# Patient Record
Sex: FEMALE | Race: WHITE | NOT HISPANIC OR LATINO | Employment: OTHER | URBAN - METROPOLITAN AREA
[De-identification: names, ages, dates, MRNs, and addresses within clinical notes are randomized per-mention and may not be internally consistent; named-entity substitution may affect disease eponyms.]

---

## 2019-12-04 ENCOUNTER — TRANSCRIBE ORDERS (OUTPATIENT)
Dept: LAB | Facility: HOSPITAL | Age: 64
End: 2019-12-04

## 2019-12-04 ENCOUNTER — HOSPITAL ENCOUNTER (OUTPATIENT)
Dept: GENERAL RADIOLOGY | Facility: HOSPITAL | Age: 64
Discharge: HOME | End: 2019-12-04
Admitting: ORTHOPAEDIC SURGERY

## 2019-12-04 ENCOUNTER — TRANSCRIBE ORDERS (OUTPATIENT)
Dept: ADMINISTRATIVE | Facility: HOSPITAL | Age: 64
End: 2019-12-04

## 2019-12-04 DIAGNOSIS — R73.09 IMPAIRED GLUCOSE TOLERANCE TEST: ICD-10-CM

## 2019-12-04 DIAGNOSIS — Z01.811 PRE-OP CHEST EXAM: Primary | ICD-10-CM

## 2019-12-04 DIAGNOSIS — Z01.818 OTHER SPECIFIED PRE-OPERATIVE EXAMINATION: ICD-10-CM

## 2019-12-04 DIAGNOSIS — Z87.898 PERSONAL HISTORY OF OTHER LYMPHATIC AND HEMATOPOIETIC NEOPLASM: Primary | ICD-10-CM

## 2019-12-04 LAB
DEPRECATED RDW RBC AUTO: 44.8 FL (ref 37–54)
ERYTHROCYTE [DISTWIDTH] IN BLOOD BY AUTOMATED COUNT: 13.6 % (ref 12.3–15.4)
HBA1C MFR BLD: 5.64 % (ref 4.8–5.6)
HCT VFR BLD AUTO: 38.6 % (ref 34–46.6)
HGB BLD-MCNC: 13.6 G/DL (ref 12–15.9)
MCH RBC QN AUTO: 31.5 PG (ref 26.6–33)
MCHC RBC AUTO-ENTMCNC: 35.2 G/DL (ref 31.5–35.7)
MCV RBC AUTO: 89.4 FL (ref 79–97)
PLATELET # BLD AUTO: 140 10*3/MM3 (ref 140–450)
PMV BLD AUTO: 10.6 FL (ref 6–12)
RBC # BLD AUTO: 4.32 10*6/MM3 (ref 3.77–5.28)
WBC NRBC COR # BLD: 4.49 10*3/MM3 (ref 3.4–10.8)

## 2019-12-04 PROCEDURE — 36415 COLL VENOUS BLD VENIPUNCTURE: CPT | Performed by: ORTHOPAEDIC SURGERY

## 2019-12-04 PROCEDURE — 83036 HEMOGLOBIN GLYCOSYLATED A1C: CPT | Performed by: ORTHOPAEDIC SURGERY

## 2019-12-04 PROCEDURE — 71046 X-RAY EXAM CHEST 2 VIEWS: CPT

## 2019-12-04 PROCEDURE — 80048 BASIC METABOLIC PNL TOTAL CA: CPT | Performed by: ORTHOPAEDIC SURGERY

## 2019-12-04 PROCEDURE — 85027 COMPLETE CBC AUTOMATED: CPT | Performed by: ORTHOPAEDIC SURGERY

## 2019-12-04 PROCEDURE — 93005 ELECTROCARDIOGRAM TRACING: CPT | Performed by: ORTHOPAEDIC SURGERY

## 2019-12-04 PROCEDURE — 93010 ELECTROCARDIOGRAM REPORT: CPT | Performed by: INTERNAL MEDICINE

## 2019-12-05 LAB
ANION GAP SERPL CALCULATED.3IONS-SCNC: 13 MMOL/L (ref 5–15)
BUN BLD-MCNC: 20 MG/DL (ref 8–23)
BUN/CREAT SERPL: 24.7 (ref 7–25)
CALCIUM SPEC-SCNC: 9.6 MG/DL (ref 8.6–10.5)
CHLORIDE SERPL-SCNC: 102 MMOL/L (ref 98–107)
CO2 SERPL-SCNC: 26 MMOL/L (ref 22–29)
CREAT BLD-MCNC: 0.81 MG/DL (ref 0.57–1)
GFR SERPL CREATININE-BSD FRML MDRD: 72 ML/MIN/1.73
GLUCOSE BLD-MCNC: 91 MG/DL (ref 65–99)
POTASSIUM BLD-SCNC: 4 MMOL/L (ref 3.5–5.2)
SODIUM BLD-SCNC: 141 MMOL/L (ref 136–145)

## 2020-09-17 ENCOUNTER — OFFICE VISIT (OUTPATIENT)
Dept: CARDIOLOGY | Facility: CLINIC | Age: 65
End: 2020-09-17

## 2020-09-17 VITALS
BODY MASS INDEX: 31.79 KG/M2 | SYSTOLIC BLOOD PRESSURE: 108 MMHG | HEART RATE: 74 BPM | OXYGEN SATURATION: 95 % | WEIGHT: 186.2 LBS | HEIGHT: 64 IN | DIASTOLIC BLOOD PRESSURE: 71 MMHG | TEMPERATURE: 97.5 F

## 2020-09-17 DIAGNOSIS — Z01.818 PREOPERATIVE CLEARANCE: ICD-10-CM

## 2020-09-17 DIAGNOSIS — R06.02 SOB (SHORTNESS OF BREATH): Primary | ICD-10-CM

## 2020-09-17 DIAGNOSIS — R94.31 ABNORMAL EKG: ICD-10-CM

## 2020-09-17 PROCEDURE — 99203 OFFICE O/P NEW LOW 30 MIN: CPT | Performed by: PHYSICIAN ASSISTANT

## 2020-09-17 RX ORDER — ESCITALOPRAM OXALATE 10 MG/1
10 TABLET ORAL DAILY
COMMUNITY
End: 2023-01-27 | Stop reason: SDUPTHER

## 2020-09-17 RX ORDER — LEVOTHYROXINE SODIUM 75 UG/1
75 CAPSULE ORAL DAILY
COMMUNITY
End: 2022-07-12

## 2020-09-17 RX ORDER — TRAMADOL HYDROCHLORIDE 50 MG/1
50 TABLET ORAL DAILY PRN
COMMUNITY
End: 2022-05-19

## 2020-09-17 NOTE — PATIENT INSTRUCTIONS
"Fat and Cholesterol Restricted Eating Plan  Getting too much fat and cholesterol in your diet may cause health problems. Choosing the right foods helps keep your fat and cholesterol at normal levels. This can keep you from getting certain diseases.  Your doctor may recommend an eating plan that includes:  · Total fat: ______% or less of total calories a day.  · Saturated fat: ______% or less of total calories a day.  · Cholesterol: less than _________mg a day.  · Fiber: ______g a day.  What are tips for following this plan?  Meal planning  · At meals, divide your plate into four equal parts:  ? Fill one-half of your plate with vegetables and green salads.  ? Fill one-fourth of your plate with whole grains.  ? Fill one-fourth of your plate with low-fat (lean) protein foods.  · Eat fish that is high in omega-3 fats at least two times a week. This includes mackerel, tuna, sardines, and salmon.  · Eat foods that are high in fiber, such as whole grains, beans, apples, broccoli, carrots, peas, and barley.  General tips    · Work with your doctor to lose weight if you need to.  · Avoid:  ? Foods with added sugar.  ? Fried foods.  ? Foods with partially hydrogenated oils.  · Limit alcohol intake to no more than 1 drink a day for nonpregnant women and 2 drinks a day for men. One drink equals 12 oz of beer, 5 oz of wine, or 1½ oz of hard liquor.  Reading food labels  · Check food labels for:  ? Trans fats.  ? Partially hydrogenated oils.  ? Saturated fat (g) in each serving.  ? Cholesterol (mg) in each serving.  ? Fiber (g) in each serving.  · Choose foods with healthy fats, such as:  ? Monounsaturated fats.  ? Polyunsaturated fats.  ? Omega-3 fats.  · Choose grain products that have whole grains. Look for the word \"whole\" as the first word in the ingredient list.  Cooking  · Cook foods using low-fat methods. These include baking, boiling, grilling, and broiling.  · Eat more home-cooked foods. Eat at restaurants and buffets " less often.  · Avoid cooking using saturated fats, such as butter, cream, palm oil, palm kernel oil, and coconut oil.  Recommended foods    Fruits  · All fresh, canned (in natural juice), or frozen fruits.  Vegetables  · Fresh or frozen vegetables (raw, steamed, roasted, or grilled). Green salads.  Grains  · Whole grains, such as whole wheat or whole grain breads, crackers, cereals, and pasta. Unsweetened oatmeal, bulgur, barley, quinoa, or brown rice. Corn or whole wheat flour tortillas.  Meats and other protein foods  · Ground beef (85% or leaner), grass-fed beef, or beef trimmed of fat. Skinless chicken or turkey. Ground chicken or turkey. Pork trimmed of fat. All fish and seafood. Egg whites. Dried beans, peas, or lentils. Unsalted nuts or seeds. Unsalted canned beans. Nut butters without added sugar or oil.  Dairy  · Low-fat or nonfat dairy products, such as skim or 1% milk, 2% or reduced-fat cheeses, low-fat and fat-free ricotta or cottage cheese, or plain low-fat and nonfat yogurt.  Fats and oils  · Tub margarine without trans fats. Light or reduced-fat mayonnaise and salad dressings. Avocado. Olive, canola, sesame, or safflower oils.  The items listed above may not be a complete list of foods and beverages you can eat. Contact a dietitian for more information.  Foods to avoid  Fruits  · Canned fruit in heavy syrup. Fruit in cream or butter sauce. Fried fruit.  Vegetables  · Vegetables cooked in cheese, cream, or butter sauce. Fried vegetables.  Grains  · White bread. White pasta. White rice. Cornbread. Bagels, pastries, and croissants. Crackers and snack foods that contain trans fat and hydrogenated oils.  Meats and other protein foods  · Fatty cuts of meat. Ribs, chicken wings, rosas, sausage, bologna, salami, chitterlings, fatback, hot dogs, bratwurst, and packaged lunch meats. Liver and organ meats. Whole eggs and egg yolks. Chicken and turkey with skin. Fried meat.  Dairy  · Whole or 2% milk, cream,  half-and-half, and cream cheese. Whole milk cheeses. Whole-fat or sweetened yogurt. Full-fat cheeses. Nondairy creamers and whipped toppings. Processed cheese, cheese spreads, and cheese curds.  Beverages  · Alcohol. Sugar-sweetened drinks such as sodas, lemonade, and fruit drinks.  Fats and oils  · Butter, stick margarine, lard, shortening, ghee, or rosas fat. Coconut, palm kernel, and palm oils.  Sweets and desserts  · Corn syrup, sugars, honey, and molasses. Candy. Jam and jelly. Syrup. Sweetened cereals. Cookies, pies, cakes, donuts, muffins, and ice cream.  The items listed above may not be a complete list of foods and beverages you should avoid. Contact a dietitian for more information.  Summary  · Choosing the right foods helps keep your fat and cholesterol at normal levels. This can keep you from getting certain diseases.  · At meals, fill one-half of your plate with vegetables and green salads.  · Eat high-fiber foods, like whole grains, beans, apples, carrots, peas, and barley.  · Limit added sugar, saturated fats, alcohol, and fried foods.  This information is not intended to replace advice given to you by your health care provider. Make sure you discuss any questions you have with your health care provider.  Document Released: 06/18/2013 Document Revised: 08/21/2019 Document Reviewed: 09/04/2018  Elsevier Patient Education © 2020 [employer]  BMI for Adults    Body mass index (BMI) is a number that is calculated from a person's weight and height. BMI may help to estimate how much of a person's weight is composed of fat. BMI can help identify those who may be at higher risk for certain medical problems.  How is BMI used with adults?  BMI is used as a screening tool to identify possible weight problems. It is used to check whether a person is obese, overweight, healthy weight, or underweight.  How is BMI calculated?  BMI measures your weight and compares it to your height. This can be done either in  "English ([place] or metric measurements. Note that charts are available to help you find your BMI quickly and easily without having to do these calculations yourself.  To calculate your BMI in English ([place] measurements, your health care provider will:  1. Measure your weight in pounds (lb).  2. Multiply the number of pounds by 703.  ? For example, for a person who weighs 180 lb, multiply that number by 703, which equals 126,540.  3. Measure your height in inches (in). Then multiply that number by itself to get a measurement called \"inches squared.\"  ? For example, for a person who is 70 in tall, the \"inches squared\" measurement is 70 in x 70 in, which equals 4900 inches squared.  4. Divide the total from Step 2 (number of lb x 703) by the total from Step 3 (inches squared): 126,540 ÷ 4900 = 25.8. This is your BMI.  To calculate your BMI in metric measurements, your health care provider will:  1. Measure your weight in kilograms (kg).  2. Measure your height in meters (m). Then multiply that number by itself to get a measurement called \"meters squared.\"  ? For example, for a person who is 1.75 m tall, the \"meters squared\" measurement is 1.75 m x 1.75 m, which is equal to 3.1 meters squared.  3. Divide the number of kilograms (your weight) by the meters squared number. In this example: 70 ÷ 3.1 = 22.6. This is your BMI.  How is BMI interpreted?  To interpret your results, your health care provider will use BMI charts to identify whether you are underweight, normal weight, overweight, or obese. The following guidelines will be used:  · Underweight: BMI less than 18.5.  · Normal weight: BMI between 18.5 and 24.9.  · Overweight: BMI between 25 and 29.9.  · Obese: BMI of 30 and above.  Please note:  · Weight includes both fat and muscle, so someone with a muscular build, such as an athlete, may have a BMI that is higher than 24.9. In cases like these, BMI is not an accurate measure of body fat.  · To determine if excess " body fat is the cause of a BMI of 25 or higher, further assessments may need to be done by a health care provider.  · BMI is usually interpreted in the same way for men and women.  Why is BMI a useful tool?  BMI is useful in two ways:  · Identifying a weight problem that may be related to a medical condition, or that may increase the risk for medical problems.  · Promoting lifestyle and diet changes in order to reach a healthy weight.  Summary  · Body mass index (BMI) is a number that is calculated from a person's weight and height.  · BMI may help to estimate how much of a person's weight is composed of fat. BMI can help identify those who may be at higher risk for certain medical problems.  · BMI can be measured using English measurements or metric measurements.  · To interpret your results, your health care provider will use BMI charts to identify whether you are underweight, normal weight, overweight, or obese.  This information is not intended to replace advice given to you by your health care provider. Make sure you discuss any questions you have with your health care provider.  Document Released: 08/29/2005 Document Revised: 11/30/2018 Document Reviewed: 10/31/2018  Elsevier Patient Education © 2020 [employer]

## 2020-09-17 NOTE — PROGRESS NOTES
Subjective   Justine Fernandes is a 65 y.o. female     Chief Complaint   Patient presents with   • Establish Care     Here to establish care    • Cardiac clearance     Will be having a right hip replacement by Dr. Pinedo on 10/2/20       HPI  The patient presents today for initial evaluation.  She is referred because of abnormal EKG and preop evaluation status.  The patient had an EKG recently for preoperative evaluation through her primary care provider.  She is pending hip replacement.  EKG suggested sinus rhythm with right bundle branch block.  There was question of septal wall and inferior wall MI.  I feel that this is related to her baseline abnormalities, not true infarct.  I have reviewed this in detail with her.  Symptomatically, she has dyspnea.  At times, she will have a sharp stabbing sensation, nothing really of significance.  The patient denies neck, arm, or jaw discomfort.  She has no PND or orthopnea.  The patient denies dysrhythmic symptoms.  She has no further complaints otherwise at this time.      Current Outpatient Medications   Medication Sig Dispense Refill   • escitalopram (LEXAPRO) 10 MG tablet Take 10 mg by mouth Daily.     • levothyroxine (SYNTHROID, LEVOTHROID) 50 MCG tablet Take 50 mcg by mouth Daily.     • metFORMIN (GLUCOPHAGE) 500 MG tablet Take 500 mg by mouth Daily With Breakfast.     • Omega-3 Fatty Acids (OMEGA-3 2100 PO) Take 1 capsule by mouth Daily.     • traMADol (ULTRAM) 50 MG tablet Take 50 mg by mouth Daily As Needed for Moderate Pain .     • VITAMIN D PO Take 140 mg by mouth Daily.       No current facility-administered medications for this visit.        Patient has no known allergies.    Past Medical History:   Diagnosis Date   • Asthma     Mild    • Bundle branch block    • Diabetes mellitus (CMS/HCC)    • Hypothyroid        Social History     Socioeconomic History   • Marital status:      Spouse name: Not on file   • Number of children: Not on file   • Years of  "education: Not on file   • Highest education level: Not on file   Tobacco Use   • Smoking status: Never Smoker   • Smokeless tobacco: Never Used   Substance and Sexual Activity   • Alcohol use: Never     Frequency: Never   • Drug use: Never   • Sexual activity: Defer       Family History   Problem Relation Age of Onset   • Heart disease Mother    • Cancer Mother    • Colon cancer Mother    • Stroke Father    • Colon cancer Father    • Cancer Sister    • Cancer Brother    • Cancer Brother    • Diabetes Brother    • Cancer Sister        Review of Systems   Constitutional: Negative.  Negative for chills, fatigue and fever.   HENT: Negative.  Negative for congestion, rhinorrhea and sore throat.    Eyes: Positive for visual disturbance (glasses daily ).   Respiratory: Negative.  Negative for chest tightness and shortness of breath.    Cardiovascular: Negative.  Negative for chest pain, palpitations and leg swelling.   Gastrointestinal: Positive for blood in stool (hemorrhoids ). Negative for abdominal pain, nausea and vomiting.   Endocrine: Negative.  Negative for cold intolerance and heat intolerance.   Genitourinary: Negative.  Negative for dysuria, frequency, hematuria and urgency.   Musculoskeletal: Positive for back pain. Negative for arthralgias.   Skin: Negative.  Negative for rash and wound.   Allergic/Immunologic: Positive for environmental allergies (seasonal ). Negative for food allergies.   Neurological: Negative.  Negative for dizziness, weakness and light-headedness.   Hematological: Negative.  Does not bruise/bleed easily.   Psychiatric/Behavioral: Negative.  Negative for agitation, confusion and sleep disturbance (denies waking with smothering ). The patient is not nervous/anxious.        Objective     Vitals:    09/17/20 1552   BP: 108/71   BP Location: Left arm   Patient Position: Sitting   Pulse: 74   Temp: 97.5 °F (36.4 °C)   SpO2: 95%   Weight: 84.5 kg (186 lb 3.2 oz)   Height: 162.6 cm (64\")    " "    /71 (BP Location: Left arm, Patient Position: Sitting)   Pulse 74   Temp 97.5 °F (36.4 °C)   Ht 162.6 cm (64\")   Wt 84.5 kg (186 lb 3.2 oz)   SpO2 95%   BMI 31.96 kg/m²      Lab Results (most recent)     None          Physical Exam  Vitals signs and nursing note reviewed.   Constitutional:       General: She is not in acute distress.     Appearance: She is well-developed.   HENT:      Head: Normocephalic and atraumatic.   Eyes:      Conjunctiva/sclera: Conjunctivae normal.      Pupils: Pupils are equal, round, and reactive to light.   Neck:      Musculoskeletal: Normal range of motion and neck supple.      Vascular: No JVD.      Trachea: No tracheal deviation.   Cardiovascular:      Rate and Rhythm: Normal rate and regular rhythm.      Heart sounds: Normal heart sounds.   Pulmonary:      Effort: Pulmonary effort is normal.      Breath sounds: Normal breath sounds.   Abdominal:      General: Bowel sounds are normal. There is no distension.      Palpations: Abdomen is soft. There is no mass.      Tenderness: There is no abdominal tenderness. There is no guarding or rebound.   Musculoskeletal: Normal range of motion.         General: No tenderness or deformity.   Skin:     General: Skin is warm and dry.      Coloration: Skin is not pale.      Findings: No erythema or rash.   Neurological:      Mental Status: She is alert and oriented to person, place, and time.   Psychiatric:         Behavior: Behavior normal.         Thought Content: Thought content normal.         Judgment: Judgment normal.         Procedure   Procedures         Assessment/Plan      Diagnosis Plan   1. SOB (shortness of breath)  Adult Transthoracic Echo Complete W/ Cont if Necessary Per Protocol   2. Preoperative clearance  Adult Transthoracic Echo Complete W/ Cont if Necessary Per Protocol   3. Abnormal EKG       1.  The patient presents today for preoperative clearance from cardiovascular standpoint in the setting of abnormalities " noted on baseline EKG.  Her EKG suggest sinus rhythm but with right bundle branch block morphology and nonspecific ST and T wave changes otherwise.  EKG was initially read as possible infarct pattern, which I feel relates more to the diffuse conduction system disease noted on the baseline EKG.  I do not feel this represents infarct.    2.  Irregardless, I would schedule for an echocardiogram so that we can evaluate LV size and function.  We can ensure no infarct pattern noted.  We can ensure no structural abnormalities otherwise.  If echocardiogram findings are unremarkable, nothing further would be indicated.    3.  I do not feel she needs ischemia assessment in the preoperative setting.  She has nothing to suggest angina or anginal equivalent complications otherwise.  Again, if echo findings are unremarkable, we will anticipate seeing the patient as needed through the clinic.  We would send a letter to her surgeon advising her surgical clearance once we can review her echo findings.           Justine Fernandes  reports that she has never smoked. She has never used smokeless tobacco.        Patient's Body mass index is 31.96 kg/m². BMI is above normal parameters. Recommendations include: educational material and referral to primary care.           Electronically signed by:

## 2020-09-18 ENCOUNTER — HOSPITAL ENCOUNTER (OUTPATIENT)
Dept: CARDIOLOGY | Facility: HOSPITAL | Age: 65
Discharge: HOME | End: 2020-09-18
Admitting: PHYSICIAN ASSISTANT

## 2020-09-18 DIAGNOSIS — R06.02 SOB (SHORTNESS OF BREATH): ICD-10-CM

## 2020-09-18 DIAGNOSIS — Z01.818 PREOPERATIVE CLEARANCE: ICD-10-CM

## 2020-09-18 PROCEDURE — 93306 TTE W/DOPPLER COMPLETE: CPT | Performed by: INTERNAL MEDICINE

## 2020-09-18 PROCEDURE — 93306 TTE W/DOPPLER COMPLETE: CPT

## 2020-09-22 LAB
BH CV ECHO MEAS - ACS: 2.5 CM
BH CV ECHO MEAS - AO MAX PG (FULL): 0.46 MMHG
BH CV ECHO MEAS - AO MAX PG: 8.4 MMHG
BH CV ECHO MEAS - AO MEAN PG (FULL): 0 MMHG
BH CV ECHO MEAS - AO MEAN PG: 5 MMHG
BH CV ECHO MEAS - AO ROOT AREA (BSA CORRECTED): 1.8
BH CV ECHO MEAS - AO ROOT AREA: 8.8 CM^2
BH CV ECHO MEAS - AO ROOT DIAM: 3.4 CM
BH CV ECHO MEAS - AO V2 MAX: 145 CM/SEC
BH CV ECHO MEAS - AO V2 MEAN: 110 CM/SEC
BH CV ECHO MEAS - AO V2 VTI: 33.5 CM
BH CV ECHO MEAS - ASC AORTA: 3.7 CM
BH CV ECHO MEAS - AVA(I,A): 3.1 CM^2
BH CV ECHO MEAS - AVA(I,D): 3.1 CM^2
BH CV ECHO MEAS - AVA(V,A): 3.1 CM^2
BH CV ECHO MEAS - AVA(V,D): 3.1 CM^2
BH CV ECHO MEAS - BSA(HAYCOCK): 2 M^2
BH CV ECHO MEAS - BSA: 1.9 M^2
BH CV ECHO MEAS - BZI_BMI: 31.9 KILOGRAMS/M^2
BH CV ECHO MEAS - BZI_METRIC_HEIGHT: 162.6 CM
BH CV ECHO MEAS - BZI_METRIC_WEIGHT: 84.4 KG
BH CV ECHO MEAS - EDV(CUBED): 35.6 ML
BH CV ECHO MEAS - EDV(MOD-SP4): 56.1 ML
BH CV ECHO MEAS - EDV(TEICH): 43.8 ML
BH CV ECHO MEAS - EF(CUBED): 89.7 %
BH CV ECHO MEAS - EF(MOD-SP4): 60.2 %
BH CV ECHO MEAS - EF(TEICH): 85.2 %
BH CV ECHO MEAS - ESV(CUBED): 3.7 ML
BH CV ECHO MEAS - ESV(MOD-SP4): 22.3 ML
BH CV ECHO MEAS - ESV(TEICH): 6.5 ML
BH CV ECHO MEAS - FS: 53.2 %
BH CV ECHO MEAS - IVS/LVPW: 2.3
BH CV ECHO MEAS - IVSD: 2.9 CM
BH CV ECHO MEAS - LA DIMENSION: 3.7 CM
BH CV ECHO MEAS - LA/AO: 1.1
BH CV ECHO MEAS - LV DIASTOLIC VOL/BSA (35-75): 29.6 ML/M^2
BH CV ECHO MEAS - LV IVRT: 0.1 SEC
BH CV ECHO MEAS - LV MASS(C)D: 312.2 GRAMS
BH CV ECHO MEAS - LV MASS(C)DI: 164.6 GRAMS/M^2
BH CV ECHO MEAS - LV MAX PG: 8 MMHG
BH CV ECHO MEAS - LV MEAN PG: 5 MMHG
BH CV ECHO MEAS - LV SYSTOLIC VOL/BSA (12-30): 11.8 ML/M^2
BH CV ECHO MEAS - LV V1 MAX: 141 CM/SEC
BH CV ECHO MEAS - LV V1 MEAN: 108 CM/SEC
BH CV ECHO MEAS - LV V1 VTI: 33.1 CM
BH CV ECHO MEAS - LVIDD: 3.3 CM
BH CV ECHO MEAS - LVIDS: 1.5 CM
BH CV ECHO MEAS - LVLD AP4: 7.6 CM
BH CV ECHO MEAS - LVLS AP4: 5.3 CM
BH CV ECHO MEAS - LVOT AREA (M): 3.1 CM^2
BH CV ECHO MEAS - LVOT AREA: 3.1 CM^2
BH CV ECHO MEAS - LVOT DIAM: 2 CM
BH CV ECHO MEAS - LVPWD: 1.3 CM
BH CV ECHO MEAS - MV A MAX VEL: 68.6 CM/SEC
BH CV ECHO MEAS - MV DEC SLOPE: 176 CM/SEC^2
BH CV ECHO MEAS - MV E MAX VEL: 54.8 CM/SEC
BH CV ECHO MEAS - MV E/A: 0.8
BH CV ECHO MEAS - RVDD: 2.1 CM
BH CV ECHO MEAS - SI(AO): 155.7 ML/M^2
BH CV ECHO MEAS - SI(CUBED): 16.9 ML/M^2
BH CV ECHO MEAS - SI(LVOT): 54.8 ML/M^2
BH CV ECHO MEAS - SI(MOD-SP4): 17.8 ML/M^2
BH CV ECHO MEAS - SI(TEICH): 19.7 ML/M^2
BH CV ECHO MEAS - SV(AO): 295.3 ML
BH CV ECHO MEAS - SV(CUBED): 32 ML
BH CV ECHO MEAS - SV(LVOT): 104 ML
BH CV ECHO MEAS - SV(MOD-SP4): 33.8 ML
BH CV ECHO MEAS - SV(TEICH): 37.3 ML
MAXIMAL PREDICTED HEART RATE: 157 BPM
STRESS TARGET HR: 133 BPM

## 2020-09-23 ENCOUNTER — TELEPHONE (OUTPATIENT)
Dept: CARDIOLOGY | Facility: CLINIC | Age: 65
End: 2020-09-23

## 2020-09-23 NOTE — TELEPHONE ENCOUNTER
Called patient with echo results. Follow up scheduled for Friday.Екатерина Ramirez MA      ----- Message from BHUMI Simmons sent at 9/23/2020  8:57 AM EDT -----  Follow-up within 2 to 3 weeks.    Result Text     1.  The the left ventricle is mildly dilated.  There is some moderate global tracheal hypertrophy with severe septal hypertrophy with a septum measuring nearly 3 cm in thickness.  However, there is no systolic anterior motion of the mitral valve and no evidence of dynamic LV outflow tract obstruction at rest.  Visually estimated ejection fraction 65 to 70% there is grade 1A diastolic dysfunction with moderate left atrial enlargement.  Right heart chambers are normal.  No septal defect mass or thrombus.     2.  Valves are physiologically and morphologically normal.  There are no stenotic lesions, valve associated masses or thrombi, or significant valvular regurgitation.     3.  No pericardial or great vessel pathology.     4.  Pulmonary artery pressures cannot be calculated.

## 2020-09-25 ENCOUNTER — OFFICE VISIT (OUTPATIENT)
Dept: CARDIOLOGY | Facility: CLINIC | Age: 65
End: 2020-09-25

## 2020-09-25 VITALS
BODY MASS INDEX: 32.17 KG/M2 | HEART RATE: 75 BPM | SYSTOLIC BLOOD PRESSURE: 111 MMHG | TEMPERATURE: 96.9 F | RESPIRATION RATE: 16 BRPM | OXYGEN SATURATION: 96 % | HEIGHT: 64 IN | WEIGHT: 188.4 LBS | DIASTOLIC BLOOD PRESSURE: 74 MMHG

## 2020-09-25 DIAGNOSIS — Z01.818 PREOPERATIVE EVALUATION TO RULE OUT SURGICAL CONTRAINDICATION: ICD-10-CM

## 2020-09-25 DIAGNOSIS — I42.2 ASYMMETRIC SEPTAL HYPERTROPHY (HCC): Primary | ICD-10-CM

## 2020-09-25 DIAGNOSIS — R06.02 SOB (SHORTNESS OF BREATH): ICD-10-CM

## 2020-09-25 PROCEDURE — 99213 OFFICE O/P EST LOW 20 MIN: CPT | Performed by: NURSE PRACTITIONER

## 2020-09-28 ENCOUNTER — PATIENT MESSAGE (OUTPATIENT)
Dept: CARDIOLOGY | Facility: CLINIC | Age: 65
End: 2020-09-28

## 2020-12-22 ENCOUNTER — OFFICE VISIT (OUTPATIENT)
Dept: CARDIOLOGY | Facility: CLINIC | Age: 65
End: 2020-12-22

## 2020-12-22 VITALS
DIASTOLIC BLOOD PRESSURE: 75 MMHG | TEMPERATURE: 97.8 F | WEIGHT: 192 LBS | OXYGEN SATURATION: 98 % | HEIGHT: 63 IN | BODY MASS INDEX: 34.02 KG/M2 | SYSTOLIC BLOOD PRESSURE: 112 MMHG | HEART RATE: 78 BPM

## 2020-12-22 DIAGNOSIS — R53.83 OTHER FATIGUE: ICD-10-CM

## 2020-12-22 DIAGNOSIS — I42.2 ASYMMETRIC SEPTAL HYPERTROPHY (HCC): Primary | ICD-10-CM

## 2020-12-22 DIAGNOSIS — R06.02 SOB (SHORTNESS OF BREATH): ICD-10-CM

## 2020-12-22 PROCEDURE — 99213 OFFICE O/P EST LOW 20 MIN: CPT | Performed by: PHYSICIAN ASSISTANT

## 2020-12-22 NOTE — PROGRESS NOTES
Problem list     Subjective   Justine Fernandes is a 65 y.o. female     Chief Complaint   Patient presents with   • Follow-up   • Shortness of Breath   Chief complaint: Follow-up to review clinical course and discuss echo results.    HPI  This pleasant patient presents into the clinic today for evaluation.  She was initially seen to the clinic for preoperative cardiac clearance.  She was scheduled for an echo because of murmur.  She had what appeared to be significant asymmetric septal hypertrophy, but no systolic anterior motion of the mitral leaflet nor significant LV outflow tract gradient noted.  She did have surgery and has since done well.  At this time, the patient reports she is clinically well.  She has stable dyspnea.  She denies chest pain.  She has no failure or dysrhythmic symptoms otherwise.  The patient has no further complaints.    Current Outpatient Medications on File Prior to Visit   Medication Sig Dispense Refill   • escitalopram (LEXAPRO) 10 MG tablet Take 10 mg by mouth Daily.     • levothyroxine (SYNTHROID, LEVOTHROID) 50 MCG tablet Take 50 mcg by mouth Daily.     • metFORMIN (GLUCOPHAGE) 500 MG tablet Take 500 mg by mouth Daily With Breakfast.     • Omega-3 Fatty Acids (OMEGA-3 2100 PO) Take 1 capsule by mouth Daily.     • VITAMIN D PO Take 140 mg by mouth Daily.     • traMADol (ULTRAM) 50 MG tablet Take 50 mg by mouth Daily As Needed for Moderate Pain .       No current facility-administered medications on file prior to visit.        Patient has no known allergies.    Past Medical History:   Diagnosis Date   • Asthma     Mild    • Bundle branch block    • Diabetes mellitus (CMS/[facility])    • Hypothyroid        Social History     Socioeconomic History   • Marital status:      Spouse name: Not on file   • Number of children: Not on file   • Years of education: Not on file   • Highest education level: Not on file   Tobacco Use   • Smoking status: Never Smoker   • Smokeless tobacco: Never Used  "  Substance and Sexual Activity   • Alcohol use: Never     Frequency: Never   • Drug use: Never   • Sexual activity: Defer       Family History   Problem Relation Age of Onset   • Heart disease Mother    • Cancer Mother    • Colon cancer Mother    • Stroke Father    • Colon cancer Father    • Heart attack Father    • Lung cancer Father    • Cancer Sister    • Cancer Brother    • Cancer Brother    • Diabetes Brother    • Cancer Sister        Review of Systems   Constitutional: Negative for chills, fatigue and fever.   HENT: Negative for congestion, rhinorrhea and sore throat.    Eyes: Positive for visual disturbance (glasses).   Respiratory: Negative for cough, chest tightness, shortness of breath and wheezing.    Cardiovascular: Negative for chest pain, palpitations and leg swelling.   Gastrointestinal: Negative for abdominal pain, blood in stool, constipation, diarrhea, nausea and vomiting.   Endocrine: Negative for cold intolerance and heat intolerance.   Genitourinary: Negative for difficulty urinating, frequency, hematuria and urgency.   Musculoskeletal: Positive for arthralgias (back) and back pain (lower back). Negative for neck pain.   Skin: Negative for rash and wound.   Allergic/Immunologic: Negative for environmental allergies and food allergies.   Neurological: Negative for dizziness, weakness, light-headedness, numbness and headaches.   Hematological: Does not bruise/bleed easily.   Psychiatric/Behavioral: Negative for sleep disturbance.       Objective   Vitals:    12/22/20 1125   BP: 112/75   Pulse: 78   Temp: 97.8 °F (36.6 °C)   SpO2: 98%   Weight: 87.1 kg (192 lb)   Height: 160 cm (63\")      /75   Pulse 78   Temp 97.8 °F (36.6 °C)   Ht 160 cm (63\")   Wt 87.1 kg (192 lb)   SpO2 98%   BMI 34.01 kg/m²    Lab Results (most recent)     None        Physical Exam  Vitals signs and nursing note reviewed.   Constitutional:       General: She is not in acute distress.     Appearance: She is " well-developed.   HENT:      Head: Normocephalic and atraumatic.   Eyes:      Conjunctiva/sclera: Conjunctivae normal.      Pupils: Pupils are equal, round, and reactive to light.   Neck:      Musculoskeletal: Normal range of motion and neck supple.      Vascular: No JVD.      Trachea: No tracheal deviation.   Cardiovascular:      Rate and Rhythm: Normal rate and regular rhythm.      Heart sounds: Murmur present. Systolic (Across the precordium) murmur present with a grade of 2/6.   Pulmonary:      Effort: Pulmonary effort is normal.      Breath sounds: Normal breath sounds.   Abdominal:      General: Bowel sounds are normal. There is no distension.      Palpations: Abdomen is soft. There is no mass.      Tenderness: There is no abdominal tenderness. There is no guarding or rebound.   Musculoskeletal: Normal range of motion.         General: No tenderness or deformity.   Skin:     General: Skin is warm and dry.      Coloration: Skin is not pale.      Findings: No erythema or rash.   Neurological:      Mental Status: She is alert and oriented to person, place, and time.   Psychiatric:         Behavior: Behavior normal.         Thought Content: Thought content normal.         Judgment: Judgment normal.           Procedure   Procedures       Assessment/Plan      Diagnosis Plan   1. Asymmetric septal hypertrophy (CMS/HCC)     2. SOB (shortness of breath)     3. Other fatigue     1.  At this time, the patient appears stable from general cardiovascular standpoint.  Echo findings were again reviewed with the patient.  This suggests asymmetric septal hypertrophy, but no evidence of systolic anterior motion of the mitral valve nor significant LV outflow tract gradient at rest.  We have discussed findings of the echo in detail with the patient.    2.  Ordinarily, we will discussed beta-blocker therapy with this patient.  Because of mostly normotensive to hypotensive status, she cannot tolerate the addition of further  medications for now.  We will address this in the future and revisit this as well.    3.  Eventually, I would consider a stress echocardiogram, given asymmetric septal hypertrophy findings by echo.  The patient is immediately postoperative status at this time and I would hold on that for now.    4.  In that setting, we will monitor echo findings longitudinally.  She will call immediately for any issues.  Otherwise, we will anticipate seeing her on 4 to 6-month intervals.             Justine Fernandes  reports that she has never smoked. She has never used smokeless tobacco...                   Patient's Body mass index is 34.01 kg/m². BMI is above normal parameters. Recommendations include: educational material.             Electronically signed by:

## 2021-06-22 ENCOUNTER — OFFICE VISIT (OUTPATIENT)
Dept: CARDIOLOGY | Facility: CLINIC | Age: 66
End: 2021-06-22

## 2021-06-22 VITALS
BODY MASS INDEX: 32.33 KG/M2 | OXYGEN SATURATION: 95 % | DIASTOLIC BLOOD PRESSURE: 78 MMHG | HEIGHT: 64 IN | HEART RATE: 61 BPM | SYSTOLIC BLOOD PRESSURE: 110 MMHG | WEIGHT: 189.4 LBS

## 2021-06-22 DIAGNOSIS — I42.2 ASYMMETRIC SEPTAL HYPERTROPHY (HCC): Primary | ICD-10-CM

## 2021-06-22 DIAGNOSIS — R53.83 OTHER FATIGUE: ICD-10-CM

## 2021-06-22 DIAGNOSIS — R06.02 SOB (SHORTNESS OF BREATH): ICD-10-CM

## 2021-06-22 PROCEDURE — 99213 OFFICE O/P EST LOW 20 MIN: CPT | Performed by: PHYSICIAN ASSISTANT

## 2021-06-22 NOTE — PROGRESS NOTES
Problem list     Subjective   Justine Fernandes is a 66 y.o. female     Chief Complaint   Patient presents with   • Follow-up     6 month        HPI  The patient presents into the clinic today for routine evaluation and follow-up.  We have seen her historically in the preoperative setting for cardiac clearance.  She was scheduled for an echo because of murmur.  There was significant asymmetric septal hypertrophy but no systolic anterior motion nor significant LV outflow tract gradient noted.  She proceeded on with surgery and did well.  She had no cardiac complications sara or postoperatively.  We have seen her recurrently since.  At this time, the patient is doing well.  She has dyspnea and fatigue.  She denies dizziness or syncope.  She has intermittent palpitations but no sustained dysrhythmic activity.  She feels that she is doing well at this time.    Current Outpatient Medications on File Prior to Visit   Medication Sig Dispense Refill   • escitalopram (LEXAPRO) 10 MG tablet Take 10 mg by mouth Daily.     • levothyroxine sodium (TIROSINT) 75 MCG capsule Take 75 mcg by mouth Daily.     • metFORMIN (GLUCOPHAGE) 500 MG tablet Take 500 mg by mouth Daily With Breakfast.     • Omega-3 Fatty Acids (OMEGA-3 2100 PO) Take 1 capsule by mouth Daily.     • VITAMIN D PO Take 140 mg by mouth Daily.     • traMADol (ULTRAM) 50 MG tablet Take 50 mg by mouth Daily As Needed for Moderate Pain .       No current facility-administered medications on file prior to visit.       Patient has no known allergies.    Past Medical History:   Diagnosis Date   • Asthma     Mild    • Bundle branch block    • Diabetes mellitus (CMS/HCC)    • Hypothyroid        Social History     Socioeconomic History   • Marital status:      Spouse name: Not on file   • Number of children: Not on file   • Years of education: Not on file   • Highest education level: Not on file   Tobacco Use   • Smoking status: Never Smoker   • Smokeless tobacco: Never Used  "  Substance and Sexual Activity   • Alcohol use: Never   • Drug use: Never   • Sexual activity: Defer       Family History   Problem Relation Age of Onset   • Heart disease Mother    • Cancer Mother    • Colon cancer Mother    • Stroke Father    • Colon cancer Father    • Heart attack Father    • Lung cancer Father    • Cancer Sister    • Cancer Brother    • Cancer Brother    • Diabetes Brother    • Cancer Sister        Review of Systems   Constitutional: Positive for fatigue. Negative for chills and fever.   HENT: Negative.  Negative for congestion, rhinorrhea and sore throat.    Eyes: Positive for visual disturbance (glasses).   Respiratory: Positive for chest tightness. Negative for shortness of breath and wheezing.    Cardiovascular: Negative.  Negative for chest pain, palpitations and leg swelling.   Gastrointestinal: Negative.    Endocrine: Negative.    Genitourinary: Negative.    Musculoskeletal: Positive for arthralgias. Negative for back pain and neck pain.   Skin: Negative.  Negative for rash and wound.   Allergic/Immunologic: Negative.  Negative for environmental allergies.   Neurological: Negative for dizziness, weakness, numbness and headaches.   Hematological: Negative.  Does not bruise/bleed easily.   Psychiatric/Behavioral: Positive for sleep disturbance (staying asleep ).       Objective   Vitals:    06/22/21 1538   BP: 110/78   BP Location: Left arm   Patient Position: Sitting   Pulse: 61   SpO2: 95%   Weight: 85.9 kg (189 lb 6.4 oz)   Height: 162.6 cm (64\")      /78 (BP Location: Left arm, Patient Position: Sitting)   Pulse 61   Ht 162.6 cm (64\")   Wt 85.9 kg (189 lb 6.4 oz)   SpO2 95%   BMI 32.51 kg/m²    Lab Results (most recent)     None        Physical Exam  Vitals and nursing note reviewed.   Constitutional:       General: She is not in acute distress.     Appearance: She is well-developed.   HENT:      Head: Normocephalic and atraumatic.   Eyes:      Conjunctiva/sclera: " Conjunctivae normal.      Pupils: Pupils are equal, round, and reactive to light.   Neck:      Vascular: No JVD.      Trachea: No tracheal deviation.   Cardiovascular:      Rate and Rhythm: Normal rate and regular rhythm.      Heart sounds: Murmur heard.   Systolic (Second RICS and LSB) murmur is present with a grade of 2/6.     Pulmonary:      Effort: Pulmonary effort is normal.      Breath sounds: Normal breath sounds.   Abdominal:      General: Bowel sounds are normal. There is no distension.      Palpations: Abdomen is soft. There is no mass.      Tenderness: There is no abdominal tenderness. There is no guarding or rebound.   Musculoskeletal:         General: No tenderness or deformity. Normal range of motion.      Cervical back: Normal range of motion and neck supple.   Skin:     General: Skin is warm and dry.      Coloration: Skin is not pale.      Findings: No erythema or rash.   Neurological:      Mental Status: She is alert and oriented to person, place, and time.   Psychiatric:         Behavior: Behavior normal.         Thought Content: Thought content normal.         Judgment: Judgment normal.           Procedure   Procedures       Assessment/Plan      Diagnosis Plan   1. Asymmetric septal hypertrophy (CMS/HCC)  Adult Transthoracic Echo Complete W/ Cont if Necessary Per Protocol   2. SOB (shortness of breath)  Adult Transthoracic Echo Complete W/ Cont if Necessary Per Protocol   3. Other fatigue  Adult Transthoracic Echo Complete W/ Cont if Necessary Per Protocol     1.  I would repeat an echocardiogram to reevaluate given known asymmetric septal hypertrophy.  We can evaluate LV outflow tract gradients, and parameters otherwise given RADHA.    2.  The patient is doing well otherwise.  I would make no further adjustments.  I again discussed consideration for beta-blocker therapy given asymmetric septal hypertrophy.  The patient already is normotensive the majority of the time to hypotensive at times.  I  would be very cautious with utilization of beta-blocker for now.  We will await results of echo and discuss further therapy with her at that time.    3.  I would make no further adjustments in medications for now.  We will see her back pending results of the above.  She will call for any complications prior to follow-up.                   Electronically signed by:

## 2021-06-22 NOTE — PATIENT INSTRUCTIONS

## 2021-08-06 ENCOUNTER — APPOINTMENT (OUTPATIENT)
Dept: CARDIOLOGY | Facility: HOSPITAL | Age: 66
End: 2021-08-06

## 2021-08-11 ENCOUNTER — APPOINTMENT (OUTPATIENT)
Dept: CARDIOLOGY | Facility: HOSPITAL | Age: 66
End: 2021-08-11

## 2022-05-19 ENCOUNTER — OFFICE VISIT (OUTPATIENT)
Dept: CARDIOLOGY | Facility: CLINIC | Age: 67
End: 2022-05-19

## 2022-05-19 VITALS
WEIGHT: 189.6 LBS | HEART RATE: 70 BPM | BODY MASS INDEX: 33.59 KG/M2 | OXYGEN SATURATION: 94 % | HEIGHT: 63 IN | DIASTOLIC BLOOD PRESSURE: 71 MMHG | SYSTOLIC BLOOD PRESSURE: 101 MMHG

## 2022-05-19 DIAGNOSIS — I42.2 ASYMMETRIC SEPTAL HYPERTROPHY: Primary | ICD-10-CM

## 2022-05-19 DIAGNOSIS — R53.83 OTHER FATIGUE: ICD-10-CM

## 2022-05-19 DIAGNOSIS — R06.02 SOB (SHORTNESS OF BREATH): ICD-10-CM

## 2022-05-19 PROCEDURE — 99213 OFFICE O/P EST LOW 20 MIN: CPT | Performed by: PHYSICIAN ASSISTANT

## 2022-05-19 PROCEDURE — 93000 ELECTROCARDIOGRAM COMPLETE: CPT | Performed by: PHYSICIAN ASSISTANT

## 2022-05-19 RX ORDER — VITAMIN E 268 MG
400 CAPSULE ORAL DAILY
COMMUNITY
End: 2022-07-22

## 2022-05-19 NOTE — PROGRESS NOTES
Problem list     Subjective   Justine Fernandes is a 67 y.o. female     Chief Complaint   Patient presents with   • Follow-up     1 year / testing review    • Chest Pain       HPI  The patient presents to clinic today for routine evaluation and follow-up.  We had seen her initially for preoperative cardiac evaluation.  She was noted to have a murmur.  She was scheduled for an echo.  This suggested asymmetric septal hypertrophy but no systolic anterior motion nor significant LV outflow tract gradient noted.  She eventually had surgery and has done well since.  We have discussed at last evaluation updating her echocardiogram.  Because of scheduling conflicts, she never had that done.  She presents back today to discuss repeating that study.  Clinically, she has stable dyspnea and fatigue.  She has no chest pain nor anginal equivalent issues otherwise.  She denies dysrhythmic or failure symptoms.  She has no further complaints.    Current Outpatient Medications on File Prior to Visit   Medication Sig Dispense Refill   • escitalopram (LEXAPRO) 10 MG tablet Take 10 mg by mouth Daily.     • levothyroxine sodium (TIROSINT) 75 MCG capsule Take 75 mcg by mouth Daily.     • metFORMIN (GLUCOPHAGE) 500 MG tablet Take 500 mg by mouth Daily With Breakfast.     • Omega-3 Fatty Acids (OMEGA-3 2100 PO) Take 1 capsule by mouth Daily.     • VITAMIN D PO Take 140 mg by mouth Daily.     • vitamin E 400 UNIT capsule Take 400 Units by mouth Daily.       No current facility-administered medications on file prior to visit.       Patient has no known allergies.    Past Medical History:   Diagnosis Date   • Asthma     Mild    • Bundle branch block    • Diabetes mellitus (HCC)    • Hypothyroid        Social History     Socioeconomic History   • Marital status:    Tobacco Use   • Smoking status: Never Smoker   • Smokeless tobacco: Never Used   Substance and Sexual Activity   • Alcohol use: Never   • Drug use: Never   • Sexual activity: Defer  "      Family History   Problem Relation Age of Onset   • Heart disease Mother    • Cancer Mother    • Colon cancer Mother    • Stroke Father    • Colon cancer Father    • Heart attack Father    • Lung cancer Father    • Cancer Sister    • Cancer Brother    • Cancer Brother    • Diabetes Brother    • Cancer Sister        Review of Systems   Constitutional: Positive for fatigue. Negative for chills and fever.   HENT: Positive for congestion. Negative for rhinorrhea and sore throat.    Eyes: Positive for visual disturbance (glasses).   Respiratory: Positive for chest tightness. Negative for shortness of breath and wheezing.    Cardiovascular: Positive for chest pain. Negative for palpitations and leg swelling.   Gastrointestinal: Negative.    Endocrine: Negative.    Genitourinary: Negative.    Musculoskeletal: Positive for arthralgias. Negative for back pain and neck pain.   Skin: Negative.  Negative for rash and wound.   Allergic/Immunologic: Positive for environmental allergies.   Neurological: Negative.  Negative for dizziness, weakness, numbness and headaches.   Hematological: Negative.  Does not bruise/bleed easily.   Psychiatric/Behavioral: Negative.  Negative for sleep disturbance.       Objective   Vitals:    05/19/22 1521   BP: 101/71   BP Location: Left arm   Patient Position: Sitting   Pulse: 70   SpO2: 94%   Weight: 86 kg (189 lb 9.6 oz)   Height: 160 cm (63\")      /71 (BP Location: Left arm, Patient Position: Sitting)   Pulse 70   Ht 160 cm (63\")   Wt 86 kg (189 lb 9.6 oz)   SpO2 94%   BMI 33.59 kg/m²    Lab Results (most recent)     None        Physical Exam  Vitals and nursing note reviewed.   Constitutional:       General: She is not in acute distress.     Appearance: She is well-developed.   HENT:      Head: Normocephalic and atraumatic.   Eyes:      Conjunctiva/sclera: Conjunctivae normal.      Pupils: Pupils are equal, round, and reactive to light.   Neck:      Vascular: No JVD.      " Trachea: No tracheal deviation.   Cardiovascular:      Rate and Rhythm: Normal rate and regular rhythm.      Heart sounds: Normal heart sounds.      Comments: 2/6 systolic murmur noted at second right intercostal space and lower left sternal border.  Pulmonary:      Effort: Pulmonary effort is normal.      Breath sounds: Normal breath sounds.   Abdominal:      General: Bowel sounds are normal. There is no distension.      Palpations: Abdomen is soft. There is no mass.      Tenderness: There is no abdominal tenderness. There is no guarding or rebound.   Musculoskeletal:         General: No tenderness or deformity. Normal range of motion.      Cervical back: Normal range of motion and neck supple.   Skin:     General: Skin is warm and dry.      Coloration: Skin is not pale.      Findings: No erythema or rash.   Neurological:      Mental Status: She is alert and oriented to person, place, and time.   Psychiatric:         Behavior: Behavior normal.         Thought Content: Thought content normal.         Judgment: Judgment normal.           Procedure     ECG 12 Lead    Date/Time: 5/19/2022 3:24 PM  Performed by: Yanick Tejeda PA  Authorized by: Yanick Tejeda PA   Comparison: compared with previous ECG from 9/16/2020  Comparison to previous ECG: Sinus rhythm, rate 65, left axis deviation, right bundle branch block morphology, voltage suggestive of LVH, no acute changes noted.                 Assessment & Plan      Diagnosis Plan   1. Asymmetric septal hypertrophy (HCC)  Adult Transthoracic Echo Complete W/ Cont if Necessary Per Protocol   2. SOB (shortness of breath)  Adult Transthoracic Echo Complete W/ Cont if Necessary Per Protocol   3. Other fatigue  Adult Transthoracic Echo Complete W/ Cont if Necessary Per Protocol     1.  At this time, the patient appears to be doing fairly well from cardiovascular standpoint.  I would like to repeat an echo given reported history of asymmetric septal hypertrophy.  We need  to evaluate the LV, potential outflow tract gradients, and cardiac parameters otherwise.    2.  Otherwise, the patient appears to be clinically stable.  I would make no adjustments in medical regimen.    3.  We will see the patient back to review echo findings and recommended further at that time.  She will call for any issues prior to follow-up.         Advance Care Planning   ACP discussion was held with the patient during this visit. Patient has an advance directive (not in EMR), copy requested.                  Electronically signed by:

## 2022-06-09 ENCOUNTER — HOSPITAL ENCOUNTER (OUTPATIENT)
Dept: CARDIOLOGY | Facility: HOSPITAL | Age: 67
Discharge: HOME | End: 2022-06-09
Admitting: PHYSICIAN ASSISTANT

## 2022-06-09 VITALS — WEIGHT: 189.6 LBS | HEIGHT: 63 IN | BODY MASS INDEX: 33.59 KG/M2

## 2022-06-09 DIAGNOSIS — R53.83 OTHER FATIGUE: ICD-10-CM

## 2022-06-09 DIAGNOSIS — R06.02 SOB (SHORTNESS OF BREATH): ICD-10-CM

## 2022-06-09 DIAGNOSIS — I42.2 ASYMMETRIC SEPTAL HYPERTROPHY: ICD-10-CM

## 2022-06-09 LAB
ASCENDING AORTA: 3.5 CM
BH CV ECHO MEAS - AO MAX PG: 13.8 MMHG
BH CV ECHO MEAS - AO MEAN PG: 7 MMHG
BH CV ECHO MEAS - AO ROOT DIAM: 3.3 CM
BH CV ECHO MEAS - AO V2 MAX: 186 CM/SEC
BH CV ECHO MEAS - AO V2 VTI: 36.4 CM
BH CV ECHO MEAS - AVA(I,D): 2.06 CM2
BH CV ECHO MEAS - EDV(CUBED): 35.3 ML
BH CV ECHO MEAS - EDV(MOD-SP2): 59 ML
BH CV ECHO MEAS - EDV(MOD-SP4): 58 ML
BH CV ECHO MEAS - EF(MOD-BP): 64 %
BH CV ECHO MEAS - EF(MOD-SP2): 64.4 %
BH CV ECHO MEAS - EF(MOD-SP4): 65.5 %
BH CV ECHO MEAS - ESV(CUBED): 8.9 ML
BH CV ECHO MEAS - ESV(MOD-SP2): 21 ML
BH CV ECHO MEAS - ESV(MOD-SP4): 20 ML
BH CV ECHO MEAS - FS: 36.9 %
BH CV ECHO MEAS - IVS/LVPW: 1.66 CM
BH CV ECHO MEAS - IVSD: 2.07 CM
BH CV ECHO MEAS - LA DIMENSION: 3.8 CM
BH CV ECHO MEAS - LAT PEAK E' VEL: 8.8 CM/SEC
BH CV ECHO MEAS - LV MASS(C)D: 210.4 GRAMS
BH CV ECHO MEAS - LV MAX PG: 10.2 MMHG
BH CV ECHO MEAS - LV MEAN PG: 6 MMHG
BH CV ECHO MEAS - LV V1 MAX: 160 CM/SEC
BH CV ECHO MEAS - LV V1 VTI: 29.5 CM
BH CV ECHO MEAS - LVIDD: 3.3 CM
BH CV ECHO MEAS - LVIDS: 2.07 CM
BH CV ECHO MEAS - LVOT AREA: 2.5 CM2
BH CV ECHO MEAS - LVOT DIAM: 1.8 CM
BH CV ECHO MEAS - LVPWD: 1.25 CM
BH CV ECHO MEAS - MED PEAK E' VEL: 4.8 CM/SEC
BH CV ECHO MEAS - MV A MAX VEL: 67.1 CM/SEC
BH CV ECHO MEAS - MV DEC SLOPE: 297 CM/SEC2
BH CV ECHO MEAS - MV DEC TIME: 0.2 MSEC
BH CV ECHO MEAS - MV E MAX VEL: 58.7 CM/SEC
BH CV ECHO MEAS - MV E/A: 0.87
BH CV ECHO MEAS - MV P1/2T: 79 MSEC
BH CV ECHO MEAS - MVA(P1/2T): 2.8 CM2
BH CV ECHO MEAS - PA ACC SLOPE: 691.5 CM/SEC2
BH CV ECHO MEAS - PA ACC TIME: 0.13 SEC
BH CV ECHO MEAS - PA PR(ACCEL): 21.2 MMHG
BH CV ECHO MEAS - PI END-D VEL: 81.4 CM/SEC
BH CV ECHO MEAS - RF(MV,LVOT)(1DIAM): 130 CM
BH CV ECHO MEAS - RF(MV,LVOT): 14
BH CV ECHO MEAS - SV(LVOT): 75.1 ML
BH CV ECHO MEAS - SV(MOD-SP2): 38 ML
BH CV ECHO MEAS - SV(MOD-SP4): 38 ML
BH CV ECHO MEAS - TR MAX PG: 18.8 MMHG
BH CV ECHO MEAS - TR MAX VEL: 217 CM/SEC
BH CV ECHO MEASUREMENTS AVERAGE E/E' RATIO: 8.63
BH CV VAS BP RIGHT ARM: NORMAL MMHG
BH CV XLRA - RV BASE: 3.3 CM
BH CV XLRA - RV LENGTH: 5.5 CM
BH CV XLRA - RV MID: 2.7 CM
BH CV XLRA - TDI S': 11.7 CM/SEC
IVRT: 77 MSEC
LEFT ATRIUM VOLUME INDEX: 23.8 ML/M2
MAXIMAL PREDICTED HEART RATE: 155 BPM
STRESS TARGET HR: 132 BPM

## 2022-06-09 PROCEDURE — 93306 TTE W/DOPPLER COMPLETE: CPT

## 2022-06-09 PROCEDURE — 93306 TTE W/DOPPLER COMPLETE: CPT | Performed by: INTERNAL MEDICINE

## 2022-06-10 ENCOUNTER — TELEPHONE (OUTPATIENT)
Dept: CARDIOLOGY | Facility: CLINIC | Age: 67
End: 2022-06-10

## 2022-06-10 NOTE — TELEPHONE ENCOUNTER
Printed - given to up front staff to schedule for ABN testing     AT [facility]      ----- Message from BHUMI Simmons sent at 6/10/2022  8:47 AM EDT -----  See me on this.

## 2022-06-17 ENCOUNTER — LAB (OUTPATIENT)
Dept: LAB | Facility: HOSPITAL | Age: 67
End: 2022-06-17

## 2022-06-17 ENCOUNTER — OFFICE VISIT (OUTPATIENT)
Dept: INTERNAL MEDICINE | Facility: CLINIC | Age: 67
End: 2022-06-17

## 2022-06-17 VITALS
WEIGHT: 189.6 LBS | HEART RATE: 77 BPM | DIASTOLIC BLOOD PRESSURE: 78 MMHG | SYSTOLIC BLOOD PRESSURE: 114 MMHG | TEMPERATURE: 98.2 F | OXYGEN SATURATION: 95 % | BODY MASS INDEX: 33.59 KG/M2 | HEIGHT: 63 IN

## 2022-06-17 DIAGNOSIS — Z13.220 LIPID SCREENING: ICD-10-CM

## 2022-06-17 DIAGNOSIS — Z13.21 ENCOUNTER FOR VITAMIN DEFICIENCY SCREENING: ICD-10-CM

## 2022-06-17 DIAGNOSIS — Z00.00 ANNUAL PHYSICAL EXAM: ICD-10-CM

## 2022-06-17 DIAGNOSIS — G47.30 SLEEP APNEA, UNSPECIFIED TYPE: ICD-10-CM

## 2022-06-17 DIAGNOSIS — D72.819 LEUKOPENIA, UNSPECIFIED TYPE: ICD-10-CM

## 2022-06-17 DIAGNOSIS — E03.9 HYPOTHYROIDISM, UNSPECIFIED TYPE: ICD-10-CM

## 2022-06-17 DIAGNOSIS — E55.9 VITAMIN D DEFICIENCY: ICD-10-CM

## 2022-06-17 DIAGNOSIS — E11.9 TYPE 2 DIABETES MELLITUS WITHOUT COMPLICATION, WITHOUT LONG-TERM CURRENT USE OF INSULIN: ICD-10-CM

## 2022-06-17 DIAGNOSIS — R53.83 FATIGUE, UNSPECIFIED TYPE: ICD-10-CM

## 2022-06-17 DIAGNOSIS — E11.9 TYPE 2 DIABETES MELLITUS WITHOUT COMPLICATION, WITHOUT LONG-TERM CURRENT USE OF INSULIN: Primary | ICD-10-CM

## 2022-06-17 DIAGNOSIS — F41.9 ANXIETY: ICD-10-CM

## 2022-06-17 DIAGNOSIS — Q24.9 CONGENITAL HEART DEFECT: ICD-10-CM

## 2022-06-17 PROCEDURE — 99204 OFFICE O/P NEW MOD 45 MIN: CPT | Performed by: PHYSICIAN ASSISTANT

## 2022-06-17 NOTE — PROGRESS NOTES
[facility] Internal Medicine    Justine Fernandes  1955   [id number]      Patient Care Team:  Radha Cooney PA-C as PCP - General (Physician Assistant)    Chief Complaint:: No chief complaint on file.           HPI  ***      There is no problem list on file for this patient.       Past Medical History:   Diagnosis Date   • Asthma     Mild    • Bundle branch block    • Diabetes mellitus (HCC)    • Hypothyroid        Past Surgical History:   Procedure Laterality Date   • BLADDER SURGERY  1973   • CARPAL TUNNEL RELEASE  2018    right    • KIDNEY STONE SURGERY  1994   • KNEE SURGERY  2019    meniscus repair- left knee   • NASAL POLYP SURGERY  2006   • NASAL SEPTUM SURGERY  1999   • REPLACEMENT TOTAL HIP LATERAL POSITION  10/02/2020   • TONSILLECTOMY AND ADENOIDECTOMY     • TUBAL ABDOMINAL LIGATION     • VARICOSE VEIN SURGERY  2006    Right leg        Family History   Problem Relation Age of Onset   • Heart disease Mother    • Cancer Mother    • Colon cancer Mother    • Stroke Father    • Colon cancer Father    • Heart attack Father    • Lung cancer Father    • Cancer Sister    • Cancer Brother    • Cancer Brother    • Diabetes Brother    • Cancer Sister        Social History     Socioeconomic History   • Marital status:    Tobacco Use   • Smoking status: Never Smoker   • Smokeless tobacco: Never Used   Substance and Sexual Activity   • Alcohol use: Never   • Drug use: Never   • Sexual activity: Defer       No Known Allergies    Review of Systems     Vital Signs  There were no vitals filed for this visit.  There is no height or weight on file to calculate BMI.  {BMI is >= 30 and <35. (Class 1 Obesity). The following options were offered after discussion;:[id number]}     Advance Care Planning   {Advance Directive Status:97195}       Current Outpatient Medications:   •  escitalopram (LEXAPRO) 10 MG tablet, Take 10 mg by mouth Daily., Disp: , Rfl:   •  levothyroxine sodium (TIROSINT) 75 MCG capsule, Take 75  mcg by mouth Daily., Disp: , Rfl:   •  metFORMIN (GLUCOPHAGE) 500 MG tablet, Take 500 mg by mouth Daily With Breakfast., Disp: , Rfl:   •  Omega-3 Fatty Acids (OMEGA-3 2100 PO), Take 1 capsule by mouth Daily., Disp: , Rfl:   •  VITAMIN D PO, Take 140 mg by mouth Daily., Disp: , Rfl:   •  vitamin E 400 UNIT capsule, Take 400 Units by mouth Daily., Disp: , Rfl:     Physical Exam     ACE III MINI            Results Review:    Recent Results (from the past 672 hour(s))   Adult Transthoracic Echo Complete W/ Cont if Necessary Per Protocol    Collection Time: 06/09/22  2:52 PM   Result Value Ref Range    Target HR (85%) 132 bpm    Max. Pred. HR (100%) 155 bpm    BH CV VAS BP RIGHT /62 mmHg    RV S' 11.70 cm/sec    RV Base 3.30 cm    RV Length 5.50 cm    RV Mid 2.70 cm    Ascending aorta 3.5 cm    IVRT 77.0 msec    LA ESV Index (BP) 23.8 ml/m2    Avg E/e' ratio 8.63     Ao root diam 3.3 cm    Ao pk [place] 186.0 cm/sec    Ao V2 VTI 36.4 cm    MUSA(I,D) 2.06 cm2    EDV(cubed) 35.3 ml    EDV(MOD-sp2) 59.0 ml    EDV(MOD-sp4) 58.0 ml    EF(MOD-bp) 64.0 %    EF(MOD-sp2) 64.4 %    EF(MOD-sp4) 65.5 %    ESV(cubed) 8.9 ml    ESV(MOD-sp2) 21.0 ml    ESV(MOD-sp4) 20.0 ml    IVS/LVPW 1.66 cm    Lat Peak E' [place] 8.8 cm/sec    LV mass(C)d 210.4 grams    LV V1 max PG 10.2 mmHg    LV V1 mean PG 6.0 mmHg    LV V1 max 160.0 cm/sec    LVPWd 1.25 cm    Med Peak E' [place] 4.80 cm/sec    MV dec slope 297.0 cm/sec2    MV dec time 0.20 msec    MV P1/2t 79.0 msec    PA acc slope 691.5 cm/sec2    PA acc time 0.13 sec    PA pr(Accel) 21.2 mmHg    PI end-d [place] 81.4 cm/sec    RF(MV,LVOT) 14     RF(MV,LVOT)(1diam) 130 cm    SV(LVOT) 75.1 ml    SV(MOD-sp2) 38.0 ml    SV(MOD-sp4) 38.0 ml    TR max PG 18.8 mmHg    Ao max PG 13.8 mmHg    Ao mean PG 7.0 mmHg    FS 36.9 %    IVSd 2.07 cm    LA dimension (2D)  3.8 cm    LV V1 VTI 29.5 cm    LVIDd 3.3 cm    LVIDs 2.07 cm    LVOT area 2.5 cm2    LVOT diam 1.80 cm    MV E/A 0.87     MVA(P1/2t) 2.8 cm2    MV A max  robert 67.1 cm/sec    MV E max robert 58.7 cm/sec    TR max robert 217.0 cm/sec     Procedures    Medication Review: Medications reviewed and noted    Social History     Socioeconomic History   • Marital status:    Tobacco Use   • Smoking status: Never Smoker   • Smokeless tobacco: Never Used   Substance and Sexual Activity   • Alcohol use: Never   • Drug use: Never   • Sexual activity: Defer        Assessment/Plan:    Problem List Items Addressed This Visit    None          There are no Patient Instructions on file for this visit.     Plan of care reviewed with patient at the conclusion of today's visit. Education was provided regarding diagnosis, management, and any prescribed or recommended OTC medications.Patient verbalizes understanding of and agreement with management plan.         {Time Spent (Optional):42176}    India Chairez LPN      Note: Part of this note may be an electronic transcription/translation of spoken language to printed text using the Dragon Dictation system.

## 2022-06-18 LAB
25(OH)D3+25(OH)D2 SERPL-MCNC: 140 NG/ML (ref 30–100)
ALBUMIN SERPL-MCNC: 4.3 G/DL (ref 3.5–5.2)
ALBUMIN/GLOB SERPL: 1.2 G/DL
ALP SERPL-CCNC: 62 U/L (ref 39–117)
ALT SERPL-CCNC: 38 U/L (ref 1–33)
AST SERPL-CCNC: 50 U/L (ref 1–32)
BASOPHILS # BLD AUTO: 0.02 10*3/MM3 (ref 0–0.2)
BASOPHILS NFR BLD AUTO: 0.5 % (ref 0–1.5)
BILIRUB SERPL-MCNC: 1.3 MG/DL (ref 0–1.2)
BUN SERPL-MCNC: 14 MG/DL (ref 8–23)
BUN/CREAT SERPL: 17.7 (ref 7–25)
CALCIUM SERPL-MCNC: 9.8 MG/DL (ref 8.6–10.5)
CHLORIDE SERPL-SCNC: 105 MMOL/L (ref 98–107)
CHOLEST SERPL-MCNC: 198 MG/DL (ref 0–200)
CO2 SERPL-SCNC: 25.1 MMOL/L (ref 22–29)
CREAT SERPL-MCNC: 0.79 MG/DL (ref 0.57–1)
EGFRCR SERPLBLD CKD-EPI 2021: 83.1 ML/MIN/1.73
EOSINOPHIL # BLD AUTO: 0.16 10*3/MM3 (ref 0–0.4)
EOSINOPHIL NFR BLD AUTO: 4.4 % (ref 0.3–6.2)
ERYTHROCYTE [DISTWIDTH] IN BLOOD BY AUTOMATED COUNT: 13.7 % (ref 12.3–15.4)
GLOBULIN SER CALC-MCNC: 3.7 GM/DL
GLUCOSE SERPL-MCNC: 93 MG/DL (ref 65–99)
HBA1C MFR BLD: 5.6 % (ref 4.8–5.6)
HCT VFR BLD AUTO: 38.2 % (ref 34–46.6)
HDLC SERPL-MCNC: 53 MG/DL (ref 40–60)
HGB BLD-MCNC: 13.9 G/DL (ref 12–15.9)
IMM GRANULOCYTES # BLD AUTO: 0 10*3/MM3 (ref 0–0.05)
IMM GRANULOCYTES NFR BLD AUTO: 0 % (ref 0–0.5)
LDLC SERPL CALC-MCNC: 118 MG/DL (ref 0–100)
LYMPHOCYTES # BLD AUTO: 1.4 10*3/MM3 (ref 0.7–3.1)
LYMPHOCYTES NFR BLD AUTO: 38.1 % (ref 19.6–45.3)
MCH RBC QN AUTO: 32.4 PG (ref 26.6–33)
MCHC RBC AUTO-ENTMCNC: 36.4 G/DL (ref 31.5–35.7)
MCV RBC AUTO: 89 FL (ref 79–97)
MONOCYTES # BLD AUTO: 0.21 10*3/MM3 (ref 0.1–0.9)
MONOCYTES NFR BLD AUTO: 5.7 % (ref 5–12)
NEUTROPHILS # BLD AUTO: 1.88 10*3/MM3 (ref 1.7–7)
NEUTROPHILS NFR BLD AUTO: 51.3 % (ref 42.7–76)
NRBC BLD AUTO-RTO: 0 /100 WBC (ref 0–0.2)
PLATELET # BLD AUTO: 118 10*3/MM3 (ref 140–450)
POTASSIUM SERPL-SCNC: 4.5 MMOL/L (ref 3.5–5.2)
PROT SERPL-MCNC: 8 G/DL (ref 6–8.5)
RBC # BLD AUTO: 4.29 10*6/MM3 (ref 3.77–5.28)
SODIUM SERPL-SCNC: 141 MMOL/L (ref 136–145)
T3FREE SERPL-MCNC: 3 PG/ML (ref 2–4.4)
T4 FREE SERPL-MCNC: 1.03 NG/DL (ref 0.93–1.7)
TRIGL SERPL-MCNC: 153 MG/DL (ref 0–150)
TSH SERPL DL<=0.005 MIU/L-ACNC: 1.92 UIU/ML (ref 0.27–4.2)
VIT B12 SERPL-MCNC: 855 PG/ML (ref 211–946)
VLDLC SERPL CALC-MCNC: 27 MG/DL (ref 5–40)
WBC # BLD AUTO: 3.67 10*3/MM3 (ref 3.4–10.8)

## 2022-06-19 NOTE — PATIENT INSTRUCTIONS
"BMI for Adults  What is BMI?  Body mass index (BMI) is a number that is calculated from a person's weight and height. BMI can help estimate how much of a person's weight is composed of fat. BMI does not measure body fat directly. Rather, it is an alternative to procedures that directly measure body fat, which can be difficult and expensive.  BMI can help identify people who may be at higher risk for certain medical problems.  What are BMI measurements used for?  BMI is used as a screening tool to identify possible weight problems. It helps determine whether a person is obese, overweight, a healthy weight, or underweight.  BMI is useful for:  Identifying a weight problem that may be related to a medical condition or may increase the risk for medical problems.  Promoting changes, such as changes in diet and exercise, to help reach a healthy weight. BMI screening can be repeated to see if these changes are working.  How is BMI calculated?  BMI involves measuring your weight in relation to your height. Both height and weight are measured, and the BMI is calculated from those numbers. This can be done either in English (U[place] or metric measurements. Note that charts and online BMI calculators are available to help you find your BMI quickly and easily without having to do these calculations yourself.  To calculate your BMI in English (U.S[place] measurements:    Measure your weight in pounds (lb).  Multiply the number of pounds by 703.  For example, for a person who weighs 180 lb, multiply that number by 703, which equals 126,540.  Measure your height in inches. Then multiply that number by itself to get a measurement called \"inches squared.\"  For example, for a person who is 70 inches tall, the \"inches squared\" measurement is 70 inches x 70 inches, which equals 4,900 inches squared.  Divide the total from step 2 (number of lb x 703) by the total from step 3 (inches squared): 126,540 ÷ 4,900 = 25.8. This is your BMI.    To " "calculate your BMI in metric measurements:  Measure your weight in kilograms (kg).  Measure your height in meters (m). Then multiply that number by itself to get a measurement called \"meters squared.\"  For example, for a person who is 1.75 m tall, the \"meters squared\" measurement is 1.75 m x 1.75 m, which is equal to 3.1 meters squared.  Divide the number of kilograms (your weight) by the meters squared number. In this example: 70 ÷ 3.1 = 22.6. This is your BMI.  What do the results mean?  BMI charts are used to identify whether you are underweight, normal weight, overweight, or obese. The following guidelines will be used:  Underweight: BMI less than 18.5.  Normal weight: BMI between 18.5 and 24.9.  Overweight: BMI between 25 and 29.9.  Obese: BMI of 30 or above.  Keep these notes in mind:  Weight includes both fat and muscle, so someone with a muscular build, such as an athlete, may have a BMI that is higher than 24.9. In cases like these, BMI is not an accurate measure of body fat.  To determine if excess body fat is the cause of a BMI of 25 or higher, further assessments may need to be done by a health care provider.  BMI is usually interpreted in the same way for men and women.  Where to find more information  For more information about BMI, including tools to quickly calculate your BMI, go to these websites:  Centers for Disease Control and Prevention: www.cdc.gov  American Heart Association: www.heart.org  National Heart, Lung, and Blood [place]: www.nhlbi.nih.gov  Summary  Body mass index (BMI) is a number that is calculated from a person's weight and height.  BMI may help estimate how much of a person's weight is composed of fat. BMI can help identify those who may be at higher risk for certain medical problems.  BMI can be measured using English measurements or metric measurements.  BMI charts are used to identify whether you are underweight, normal weight, overweight, or obese.  This information is not " "intended to replace advice given to you by your health care provider. Make sure you discuss any questions you have with your health care provider.  Document Revised: 09/09/2020 Document Reviewed: 07/17/2020  [employer] Patient Education © 2021 [employer]  Critical care medicine: Principles of diagnosis and management in the adult (4th ed., pp. 9813-3496). Jorgensen.\"> Brown's anesthesia (8th ed., pp. 232-250). Jorgensen.\">   Advance Directive    Advance directives are legal documents that allow you to make decisions about your health care and medical treatment in case you become unable to communicate for yourself. Advance directives let your wishes be known to family, friends, and health care providers.  Discussing and writing advance directives should happen over time rather than all at once. Advance directives can be changed and updated at any time. There are different types of advance directives, such as:  Medical power of .  Living will.  Do not resuscitate (DNR) order or do not attempt resuscitation (DNAR) order.  Health care proxy and medical power of   A health care proxy is also called a health care agent. This person is appointed to make medical decisions for you when you are unable to make decisions for yourself. Generally, people ask a trusted friend or family member to act as their proxy and represent their preferences. Make sure you have an agreement with your trusted person to act as your proxy. A proxy may have to make a medical decision on your behalf if your wishes are not known.  A medical power of , also called a durable power of  for health care, is a legal document that names your health care proxy. Depending on the laws in your state, the document may need to be:  Signed.  Notarized.  Dated.  Copied.  Witnessed.  Incorporated into your medical record.  You may also want to appoint a trusted person to manage your money in the event you are unable to do so. This is " called a durable power of  for finances. It is a separate legal document from the durable power of  for health care. You may choose your health care proxy or someone different to act as your agent in money matters.  If you do not appoint a proxy, or there is a concern that the proxy is not acting in your best interest, a court may appoint a guardian to act on your behalf.  Living will  A living will is a set of instructions that state your wishes about medical care when you cannot express them yourself. Health care providers should keep a copy of your living will in your medical record. You may want to give a copy to family members or friends. To alert caregivers in case of an emergency, you can place a card in your wallet to let them know that you have a living will and where they can find it. A living will is used if you become:  Terminally ill.  Disabled.  Unable to communicate or make decisions.  The following decisions should be included in your living will:  To use or not to use life support equipment, such as dialysis machines and breathing machines (ventilators).  Whether you want a DNR or DNAR order. This tells health care providers not to use cardiopulmonary resuscitation (CPR) if breathing or heartbeat stops.  To use or not to use tube feeding.  To be given or not to be given food and fluids.  Whether you want comfort (palliative) care when the goal becomes comfort rather than a cure.  Whether you want to donate your organs and tissues.  A living will does not give instructions for distributing your money and property if you should pass away.  DNR or DNAR  A DNR or DNAR order is a request not to have CPR in the event that your heart stops beating or you stop breathing. If a DNR or DNAR order has not been made and shared, a health care provider will try to help any patient whose heart has stopped or who has stopped breathing. If you plan to have surgery, talk with your health care provider  about how your DNR or DNAR order will be followed if problems occur.  What if I do not have an advance directive?  Some states assign family decision makers to act on your behalf if you do not have an advance directive. Each state has its own laws about advance directives. You may want to check with your health care provider, , or state representative about the laws in your state.  Summary  Advance directives are legal documents that allow you to make decisions about your health care and medical treatment in case you become unable to communicate for yourself.  The process of discussing and writing advance directives should happen over time. You can change and update advance directives at any time.  Advance directives may include a medical power of , a living will, and a DNR or DNAR order.  This information is not intended to replace advice given to you by your health care provider. Make sure you discuss any questions you have with your health care provider.  Document Revised: 09/21/2021 Document Reviewed: 09/21/2021  Elsevier Patient Education © 2021 [employer]

## 2022-06-24 ENCOUNTER — TELEPHONE (OUTPATIENT)
Dept: CARDIOLOGY | Facility: CLINIC | Age: 67
End: 2022-06-24

## 2022-06-24 NOTE — TELEPHONE ENCOUNTER
"Patient called and states she has not received results from Echo, I looked in chart and it is noted \"See me on this\". If you have any recommendations Per Yanick Tejeda,CHANELLE I will address just send to me please.      JENNA MENDEZ MA    "

## 2022-06-24 NOTE — TELEPHONE ENCOUNTER
Spoke to patient on echo - heart pump function looks to be good but there is an area of concern that Yanick TRIPATHI would like to see her in office to discuss.    Patient is to keep appointment for 7/1/2022      Patient verbalized understanding     ROHINIMA

## 2022-07-01 ENCOUNTER — OFFICE VISIT (OUTPATIENT)
Dept: CARDIOLOGY | Facility: CLINIC | Age: 67
End: 2022-07-01

## 2022-07-01 VITALS
OXYGEN SATURATION: 96 % | HEIGHT: 63 IN | WEIGHT: 192.2 LBS | HEART RATE: 62 BPM | DIASTOLIC BLOOD PRESSURE: 79 MMHG | BODY MASS INDEX: 34.05 KG/M2 | SYSTOLIC BLOOD PRESSURE: 114 MMHG

## 2022-07-01 DIAGNOSIS — R06.02 SHORTNESS OF BREATH: ICD-10-CM

## 2022-07-01 DIAGNOSIS — I42.2 ASYMMETRIC SEPTAL HYPERTROPHY: Primary | ICD-10-CM

## 2022-07-01 DIAGNOSIS — R93.1 ABNORMAL ECHOCARDIOGRAM: ICD-10-CM

## 2022-07-01 PROCEDURE — 99213 OFFICE O/P EST LOW 20 MIN: CPT | Performed by: PHYSICIAN ASSISTANT

## 2022-07-01 NOTE — PROGRESS NOTES
Problem list     Subjective   Justine Fernandes is a 67 y.o. female     Chief Complaint   Patient presents with   • Follow-up     2 month / ABN echo        HPI  The patient presents in the clinic today for follow-up.  We had seen her initially for murmur noted in the preoperative setting.  She was scheduled for an echocardiogram, which she did have on 9/2020.  Echo at this time supported severe septal hypertrophy, no systolic anterior motion of mitral valve, moderate left atrial enlargement, grade 1A diastolic dysfunction, and no significant outflow gradient noted.  We wanted to see her back and have her perform an echo to evaluate her asymmetric septal hypertrophy.  Because of COVID and scheduling conflicts, she had difficulties getting that performed.  She eventually did have that last month.  This was read at [facility] in [place] and supported again severe asymmetric septal hypertrophy, measurement at approximately 2.4 cm, LV outflow tract gradient with provocation was at 130 mmHg.  Systolic anterior motion was noted.    Clinically, the patient appears to be doing fairly well.  Her biggest concern is with severe fatigue.  This results in a degree of exercise intolerance.  Her dyspnea is minimal and nonlimiting by her report.  She denies chest pain.  She denies dysrhythmic symptoms at this time, in particular no palpitations, dizziness, and certainly no syncope.  She reports no failure symptoms.  She has no further complaints at this time.    Current Outpatient Medications on File Prior to Visit   Medication Sig Dispense Refill   • escitalopram (LEXAPRO) 10 MG tablet Take 10 mg by mouth Daily.     • levothyroxine sodium (TIROSINT) 75 MCG capsule Take 75 mcg by mouth Daily.     • metFORMIN (GLUCOPHAGE) 500 MG tablet Take 500 mg by mouth Daily With Breakfast.     • vitamin E 400 UNIT capsule Take 400 Units by mouth Daily.     • Omega-3 Fatty Acids (OMEGA-3 2100 PO) Take 1 capsule by mouth Daily.     • [DISCONTINUED]  VITAMIN D PO Take 140 mg by mouth Daily.       No current facility-administered medications on file prior to visit.       Patient has no known allergies.    Past Medical History:   Diagnosis Date   • Anxiety    • Asthma     Mild    • Bundle branch block    • Circulation disorder of lower extremity     legs   • Diabetes mellitus (HCC)    • Hypothyroid    • Sleep apnea        Social History     Socioeconomic History   • Marital status:    Tobacco Use   • Smoking status: Never Smoker   • Smokeless tobacco: Never Used   Substance and Sexual Activity   • Alcohol use: Never   • Drug use: Never   • Sexual activity: Defer       Family History   Problem Relation Age of Onset   • Heart disease Mother    • Cancer Mother    • Colon cancer Mother    • Arthritis Mother    • Angina Mother    • Osteoporosis Mother    • Hyperlipidemia Mother    • Stroke Father    • Colon cancer Father    • Heart attack Father    • Lung cancer Father    • Cancer Sister    • Migraines Sister    • Cancer Sister    • Cancer Brother    • Arthritis Brother    • Hypertension Brother    • Cancer Brother    • Diabetes Brother        Review of Systems   Constitutional: Positive for fatigue. Negative for chills and fever.   HENT: Negative.  Negative for congestion, rhinorrhea and sore throat.    Eyes: Positive for visual disturbance (glasses).   Respiratory: Negative.  Negative for chest tightness, shortness of breath and wheezing.    Cardiovascular: Negative.  Negative for chest pain, palpitations and leg swelling.   Gastrointestinal: Negative.    Endocrine: Negative.    Genitourinary: Negative.    Musculoskeletal: Negative.  Negative for arthralgias, back pain and neck pain.   Skin: Negative.  Negative for rash and wound.   Allergic/Immunologic: Positive for environmental allergies.   Neurological: Negative.  Negative for dizziness, weakness, numbness and headaches.   Hematological: Negative.  Does not bruise/bleed easily.   Psychiatric/Behavioral:  "Negative.  Negative for sleep disturbance.       Objective   Vitals:    07/01/22 0837   BP: 114/79   BP Location: Left arm   Patient Position: Sitting   Pulse: 62   SpO2: 96%   Weight: 87.2 kg (192 lb 3.2 oz)   Height: 160 cm (62.99\")      /79 (BP Location: Left arm, Patient Position: Sitting)   Pulse 62   Ht 160 cm (62.99\")   Wt 87.2 kg (192 lb 3.2 oz)   SpO2 96%   BMI 34.06 kg/m²    Lab Results (most recent)     None        Physical Exam  Vitals and nursing note reviewed.   Constitutional:       General: She is not in acute distress.     Appearance: She is well-developed.   HENT:      Head: Normocephalic and atraumatic.   Eyes:      Conjunctiva/sclera: Conjunctivae normal.      Pupils: Pupils are equal, round, and reactive to light.   Neck:      Vascular: No JVD.      Trachea: No tracheal deviation.   Cardiovascular:      Rate and Rhythm: Normal rate and regular rhythm.      Heart sounds: Normal heart sounds.      Comments: 1/6 to 2/6 systolic murmur noted basically across the precordium.  This is noted with greatest intensity at the mid lower left sternal borders today.  Pulmonary:      Effort: Pulmonary effort is normal.      Breath sounds: Normal breath sounds.   Abdominal:      General: Bowel sounds are normal. There is no distension.      Palpations: Abdomen is soft. There is no mass.      Tenderness: There is no abdominal tenderness. There is no guarding or rebound.   Musculoskeletal:         General: No tenderness or deformity. Normal range of motion.      Cervical back: Normal range of motion and neck supple.   Skin:     General: Skin is warm and dry.      Coloration: Skin is not pale.      Findings: No erythema or rash.   Neurological:      Mental Status: She is alert and oriented to person, place, and time.   Psychiatric:         Behavior: Behavior normal.         Thought Content: Thought content normal.         Judgment: Judgment normal.           Procedure   Procedures       Assessment & " Plan      Diagnosis Plan   1. Asymmetric septal hypertrophy (HCC)  Ambulatory Referral to Cardiology   2. Abnormal echocardiogram  Ambulatory Referral to Cardiology   3. Shortness of breath       1.  Echo findings suggest severe asymmetric septal hypertrophy, with associated systolic anterior motion of the mitral valve and outflow tract gradient of 130 mmHg.  I would like to have the patient evaluated at  and will initiate a referral to Dr. Junior given echo findings.  She will need further evaluation with cardiac MRI, possible genetic testing, etc. which I feel would be better performed at a tertiary center.  We will schedule her for consultation accordingly.    2.  Ideally, I would institute low-dose beta-blocker therapy.  Blood pressures noted previously in the clinic, however, limit our ability to institute medications at this time.  Will await further cardiac testing at  and may consider challenging the same at a later date.    3.  We will see her clinically as scheduled.  We will await consultation at .           Advance Care Planning   ACP discussion was held with the patient during this visit. Patient has an advance directive (not in EMR), copy requested.                Electronically signed by:

## 2022-07-12 RX ORDER — LEVOTHYROXINE SODIUM 75 MCG
75 TABLET ORAL DAILY
Qty: 30 TABLET | Refills: 5 | Status: SHIPPED | OUTPATIENT
Start: 2022-07-12 | End: 2022-07-22

## 2022-07-21 ENCOUNTER — TELEPHONE (OUTPATIENT)
Dept: INTERNAL MEDICINE | Facility: CLINIC | Age: 67
End: 2022-07-21

## 2022-07-22 ENCOUNTER — OFFICE VISIT (OUTPATIENT)
Dept: INTERNAL MEDICINE | Facility: CLINIC | Age: 67
End: 2022-07-22

## 2022-07-22 VITALS
SYSTOLIC BLOOD PRESSURE: 102 MMHG | TEMPERATURE: 98.2 F | OXYGEN SATURATION: 93 % | HEART RATE: 50 BPM | WEIGHT: 190 LBS | DIASTOLIC BLOOD PRESSURE: 62 MMHG | BODY MASS INDEX: 33.66 KG/M2 | HEIGHT: 63 IN

## 2022-07-22 DIAGNOSIS — Q24.9 CONGENITAL HEART DEFECT: ICD-10-CM

## 2022-07-22 DIAGNOSIS — D72.819 LEUKOPENIA, UNSPECIFIED TYPE: ICD-10-CM

## 2022-07-22 DIAGNOSIS — K74.69 OTHER CIRRHOSIS OF LIVER: ICD-10-CM

## 2022-07-22 DIAGNOSIS — Z00.00 WELCOME TO MEDICARE PREVENTIVE VISIT: Primary | ICD-10-CM

## 2022-07-22 DIAGNOSIS — E11.9 TYPE 2 DIABETES MELLITUS WITHOUT COMPLICATION, WITHOUT LONG-TERM CURRENT USE OF INSULIN: ICD-10-CM

## 2022-07-22 DIAGNOSIS — E03.9 ACQUIRED HYPOTHYROIDISM: ICD-10-CM

## 2022-07-22 DIAGNOSIS — D69.6 PLATELETS DECREASED: ICD-10-CM

## 2022-07-22 DIAGNOSIS — F41.9 ANXIETY: ICD-10-CM

## 2022-07-22 PROCEDURE — 1159F MED LIST DOCD IN RCRD: CPT | Performed by: PHYSICIAN ASSISTANT

## 2022-07-22 PROCEDURE — G0438 PPPS, INITIAL VISIT: HCPCS | Performed by: PHYSICIAN ASSISTANT

## 2022-07-22 PROCEDURE — 1170F FXNL STATUS ASSESSED: CPT | Performed by: PHYSICIAN ASSISTANT

## 2022-07-22 PROCEDURE — 96160 PT-FOCUSED HLTH RISK ASSMT: CPT | Performed by: PHYSICIAN ASSISTANT

## 2022-07-22 RX ORDER — LEVOTHYROXINE SODIUM 0.05 MG/1
TABLET ORAL
COMMUNITY
Start: 2021-07-01 | End: 2023-01-27 | Stop reason: DRUGHIGH

## 2022-07-22 RX ORDER — VIT C/B6/B5/MAGNESIUM/HERB 173 50-5-6-5MG
400 CAPSULE ORAL DAILY
COMMUNITY
End: 2022-12-20

## 2022-07-22 RX ORDER — LEVOTHYROXINE SODIUM 50 MCG
50 TABLET ORAL DAILY
Qty: 90 TABLET | Refills: 1 | Status: SHIPPED | OUTPATIENT
Start: 2022-07-22 | End: 2023-01-27 | Stop reason: SDUPTHER

## 2022-07-22 RX ORDER — METOPROLOL TARTRATE 50 MG/1
50 TABLET, FILM COATED ORAL
COMMUNITY
Start: 2022-07-13 | End: 2023-01-27 | Stop reason: DRUGHIGH

## 2022-07-22 RX ORDER — ALBUTEROL SULFATE 90 UG/1
AEROSOL, METERED RESPIRATORY (INHALATION)
COMMUNITY

## 2022-07-22 RX ORDER — LANOLIN ALCOHOL/MO/W.PET/CERES
1 CREAM (GRAM) TOPICAL DAILY
COMMUNITY
End: 2022-12-20

## 2022-07-22 NOTE — PROGRESS NOTES
QUICK REFERENCE INFORMATION:  The ABCs of the Annual Wellness Visit    Welcome to Medicare Visit    HEALTH RISK ASSESSMENT    1955    Recent Hospitalizations:  No hospitalization(s) within the last year..      Current Medical Providers:  Patient Care Team:  Radha Cooney PA-C as PCP - General (Physician Assistant)  Magdiel Junior MD as Consulting Physician (Cardiology)  Roscoe Holley DO as Consulting Physician (Gastroenterology)  Royal Briceño MD as Consulting Physician (Hematology and Oncology)  Mable Ortega APRN as Nurse Practitioner (Family Medicine)      Smoking Status:  Social History     Tobacco Use   Smoking Status Never Smoker   Smokeless Tobacco Never Used       Alcohol Consumption:  Social History     Substance and Sexual Activity   Alcohol Use Never       Depression Screen:   PHQ-2/PHQ-9 Depression Screening 7/22/2022   Little Interest or Pleasure in Doing Things 0-->not at all   Feeling Down, Depressed or Hopeless 0-->not at all   PHQ-9: Brief Depression Severity Measure Score 0       Health Habits and Functional and Cognitive Screening:  Functional & Cognitive Status 7/22/2022   Do you have difficulty preparing food and eating? No   Do you have difficulty bathing yourself, getting dressed or grooming yourself? No   Do you have difficulty using the toilet? No   Do you have difficulty moving around from place to place? No   Do you have trouble with steps or getting out of a bed or a chair? No   Current Diet Well Balanced Diet   Dental Exam Up to date        Dental Exam Comment 02/22   Eye Exam Up to date        Eye Exam Comment 07/22   Exercise (times per week) 5 times per week   Current Exercises Include Walking   Do you need help using the phone?  No   Are you deaf or do you have serious difficulty hearing?  Yes   Do you need help with transportation? No   Do you need help shopping? No   Do you need help preparing meals?  No   Do you need help with  housework?  No   Do you need help with laundry? No   Do you need help taking your medications? No   Do you need help managing money? No   Do you ever drive or ride in a car without wearing a seat belt? No   Have you felt unusual stress, anger or loneliness in the last month? No   Who do you live with? Alone   If you need help, do you have trouble finding someone available to you? No   Have you been bothered in the last four weeks by sexual problems? No   Do you have difficulty concentrating, remembering or making decisions? No       Fall Risk Screen:  RENETTA Fall Risk Assessment was completed, and patient is at LOW risk for falls.Assessment completed on:7/22/2022    ACE III MINI        Does the patient have evidence of cognitive impairment? No    Aspirin use counseling? Does not need ASA (and currently is not on it)      Recent Lab Results:  CMP:  Lab Results   Component Value Date    BUN 14 06/17/2022    CREATININE 0.79 06/17/2022    EGFRIFNONA 72 12/04/2019    BCR 17.7 06/17/2022     06/17/2022    K 4.5 06/17/2022    CO2 25.1 06/17/2022    CALCIUM 9.8 06/17/2022    PROTENTOTREF 8.0 06/17/2022    ALBUMIN 4.30 06/17/2022    LABGLOBREF 3.7 06/17/2022    LABIL2 1.2 06/17/2022    BILITOT 1.3 (H) 06/17/2022    ALKPHOS 62 06/17/2022    AST 50 (H) 06/17/2022    ALT 38 (H) 06/17/2022     HbA1c:  Lab Results   Component Value Date    HGBA1C 5.60 06/17/2022    HGBA1C 5.64 (H) 12/04/2019     Microalbumin:  No results found for: MICROALBUR, POCMALB, POCCREAT  Lipid Panel  Lab Results   Component Value Date    TRIG 153 (H) 06/17/2022    HDL 53 06/17/2022     (H) 06/17/2022    AST 50 (H) 06/17/2022    ALT 38 (H) 06/17/2022       Visual Acuity:   Visual Acuity Screening    Right eye Left eye Both eyes   Without correction:      With correction: 20/25 20/25 20/25       Age-appropriate Screening Schedule:  Refer to the list below for future screening recommendations based on patient's age, sex and/or medical  conditions. Orders for these recommended tests are listed in the plan section. The patient has been provided with a written plan.    Health Maintenance   Topic Date Due   • URINE MICROALBUMIN  Never done   • DXA SCAN  Never done   • TDAP/TD VACCINES (2 - Td or Tdap) 03/27/2019   • DIABETIC FOOT EXAM  Never done   • PAP SMEAR  Never done   • DIABETIC EYE EXAM  Never done   • INFLUENZA VACCINE  10/01/2022   • HEMOGLOBIN A1C  12/17/2022   • MAMMOGRAM  06/23/2024   • ZOSTER VACCINE  Completed        Subjective   History of Present Illness    Justine Fernandes is a 67 y.o. female an established patient presenting for a Welcome to Medicare Visit.     Justine Fernandes is a 67-year-old female who presents for a [place] to Medicare visit.  Her past medical history is significant for a congenital heart defect, type 2 diabetes, hypothyroidism, leukopenia, and anxiety. She was last seen in the office on 06/17/2022 to establish care.     Cirrhosis  She was diagnosed with nonalcoholic cirrhosis of the liver approximately 2 days ago.     Lab work  Her previous lab work showed a slightly elevated liver enzyme reading, an elevated AST, ALT, and bilirubin level. Her LDL was 118 mg/dL. Her vitamin D was 140.  She has since discontinued vitamin D supplementation.  Her A1c is 5.6 percent. She admits she experienced a low WBC  and she is wondering if it is related to her diagnosis. She notes she has been experiencing this for a couple of years. She recently had a bone marrow biopsy performed which was negative. She visited her hematologist, Dr. Briceño, on 07/22/2022 and her follow-up is scheduled for 07/29/2022. Her platelet count is low.     Health maintenance  She notes she is currently out of Synthroid. She notes she is visiting Dr. Barajas for optometry and CAREN Swanson, for dermatology. She has an optometry appointment scheduled for today, 07/22/2022. She is seeing Dr. Holley for gastroenterology. She has an advanced care  "directive. She denies any falls. She currently lives alone. She does visit a dentist regularly. She has been exercising. She is currently taking metformin 500 mg. She denies any nasal congestion or cough. She is experiencing crepitus in her bilateral knees. She admits good sensation in her bilateral feet. She admits taking a medication in the past that was \"purple\".    Congenital heart defect  She visited the [facility] at  approximately 1-2 weeks ago and she will be having an MRI of her heart performed.    CHRONIC CONDITIONS    The following portions of the patient's history were reviewed and updated as appropriate: allergies, current medications, past family history, past medical history, past social history, past surgical history and problem list.    Outpatient Medications Prior to Visit   Medication Sig Dispense Refill   • albuterol sulfate  (90 Base) MCG/ACT inhaler ProAir HFA 90 mcg/actuation aerosol inhaler   Every six hours     • escitalopram (LEXAPRO) 10 MG tablet Take 10 mg by mouth Daily.     • glucosamine-chondroitin 500-400 MG per tablet Take 1 tablet by mouth Daily.     • levothyroxine (SYNTHROID, LEVOTHROID) 50 MCG tablet      • metFORMIN (GLUCOPHAGE) 500 MG tablet Take 500 mg by mouth Daily With Breakfast.     • metoprolol tartrate (LOPRESSOR) 50 MG tablet Take 50 mg by mouth.     • Omega-3 Fatty Acids (OMEGA-3 2100 PO) Take 1 capsule by mouth Daily. 2.000 mcg     • Turmeric 500 MG capsule Take 400 mg by mouth Daily.     • Synthroid 75 MCG tablet Take 1 tablet by mouth Daily. 30 tablet 5   • vitamin E 400 UNIT capsule Take 400 Units by mouth Daily.       No facility-administered medications prior to visit.       Patient Active Problem List   Diagnosis   • Anxiety   • Type 2 diabetes mellitus without complication, without long-term current use of insulin (HCC)   • Congenital heart defect   • Sleep apnea   • Leukopenia   • Hypothyroidism   • Fatigue   • Other cirrhosis of liver (HCC) "   • Platelets decreased (HCC)   • Welcome to Medicare preventive visit       Advance Care Planning:  ACP discussion was held with the patient during this visit. Patient has an advance directive in EMR which is still valid.     Identification of Risk Factors:  Risk factors include: Cardiovascular risk.    Review of Systems   Constitutional: Negative for chills, fatigue and fever.   HENT: Negative for congestion, ear pain and sinus pressure.    Respiratory: Negative for cough, chest tightness, shortness of breath and wheezing.    Cardiovascular: Negative for chest pain and palpitations.   Gastrointestinal: Negative for abdominal pain, blood in stool and constipation.   Endocrine: Negative for cold intolerance and heat intolerance.   Skin: Negative for color change.   Allergic/Immunologic: Negative for environmental allergies.   Neurological: Negative for dizziness, speech difficulty and headaches.   Psychiatric/Behavioral: Negative for confusion. The patient is not nervous/anxious.        Compared to one year ago, the patient feels her physical health is worse.  Compared to one year ago, the patient feels her mental health is the same.    Objective    Physical Exam  Vitals reviewed.   Constitutional:       Appearance: Normal appearance. She is well-developed.   HENT:      Head: Normocephalic and atraumatic.      Right Ear: Hearing, tympanic membrane, ear canal and external ear normal.      Left Ear: Hearing, tympanic membrane, ear canal and external ear normal.      Nose: Nose normal.      Mouth/Throat:      Mouth: Mucous membranes are moist.      Pharynx: Oropharynx is clear. Uvula midline.   Eyes:      General: Lids are normal.      Conjunctiva/sclera: Conjunctivae normal.      Pupils: Pupils are equal, round, and reactive to light.   Cardiovascular:      Rate and Rhythm: Normal rate and regular rhythm.      Heart sounds: Normal heart sounds.   Pulmonary:      Effort: Pulmonary effort is normal.      Breath sounds:  "Normal breath sounds.   Abdominal:      General: Bowel sounds are normal.      Palpations: Abdomen is soft.   Musculoskeletal:         General: Normal range of motion.      Cervical back: Full passive range of motion without pain, normal range of motion and neck supple.   Skin:     General: Skin is warm and dry.   Neurological:      General: No focal deficit present.      Mental Status: She is alert and oriented to person, place, and time. Mental status is at baseline.      Deep Tendon Reflexes: Reflexes are normal and symmetric.   Psychiatric:         Mood and Affect: Mood normal.         Speech: Speech normal.         Behavior: Behavior normal.         Thought Content: Thought content normal.         Judgment: Judgment normal.          Vitals:    07/22/22 1106   BP: 102/62   BP Location: Right arm   Patient Position: Sitting   Cuff Size: Adult   Pulse: 50   Temp: 98.2 °F (36.8 °C)   TempSrc: Temporal   SpO2: 93%   Weight: 86.2 kg (190 lb)   Height: 160 cm (62.99\")   PainSc: 0-No pain       BMI is >= 30 and <35. (Class 1 Obesity). The following options were offered after discussion;: weight loss educational material (shared in after visit summary), exercise counseling/recommendations and nutrition counseling/recommendations      Procedure   Procedures       Assessment & Plan   Problem List Items Addressed This Visit        Cardiac and Vasculature    Congenital heart defect    Overview     Follows up with [facility].  MRI of heart in June. Follows with Dr. Junior. SOB with exertion.           Relevant Medications    metoprolol tartrate (LOPRESSOR) 50 MG tablet       Coag and Thromboembolic    Platelets decreased (HCC)       Endocrine and Metabolic    Type 2 diabetes mellitus without complication, without long-term current use of insulin (HCC)    Overview     Currently taking metformin 500mg daily.           Relevant Medications    metFORMIN (GLUCOPHAGE) 500 MG tablet    Hypothyroidism    Overview     " Currently taking levothyroxine 50mg daily. Labs today.            Relevant Medications    levothyroxine (SYNTHROID, LEVOTHROID) 50 MCG tablet    metoprolol tartrate (LOPRESSOR) 50 MG tablet    Synthroid 50 MCG tablet       Gastrointestinal Abdominal     Other cirrhosis of liver (HCC)    Overview     Elevated Ast, ALT, and bilirubin. Follows with Dr. Holley.  He is holding EGD until MRI of heart completed.    I advised the patient to monitor her diet and avoid processed foods.   I advised the patient to exercise more often.  I advised the patient to discontinue taking omega-3 supplements for the time being.               Health Encounters    Welcome to Medicare preventive visit - Primary    Overview     She will be due for Pneumovax 23.  Current on mammogram.  Due for colonoscopy?  Fasting labs from June discussed with patient.              Hematology and Neoplasia    Leukopenia    Overview     Currently follows up with hematology [facility] in [place]. Bone marrow biopsy was negative.              Mental Health    Anxiety    Overview     Currently taking Lexapro 10mg daily which controls this well.                 Patient Self-Management and Personalized Health Advice  The patient has been provided with information about: weight management and preventive services including:   · Annual Wellness Visit (AWV).    Outpatient Encounter Medications as of 7/22/2022   Medication Sig Dispense Refill   • albuterol sulfate  (90 Base) MCG/ACT inhaler ProAir HFA 90 mcg/actuation aerosol inhaler   Every six hours     • escitalopram (LEXAPRO) 10 MG tablet Take 10 mg by mouth Daily.     • glucosamine-chondroitin 500-400 MG per tablet Take 1 tablet by mouth Daily.     • levothyroxine (SYNTHROID, LEVOTHROID) 50 MCG tablet      • metFORMIN (GLUCOPHAGE) 500 MG tablet Take 500 mg by mouth Daily With Breakfast.     • metoprolol tartrate (LOPRESSOR) 50 MG tablet Take 50 mg by mouth.     • Omega-3 Fatty Acids (OMEGA-3  "2100 PO) Take 1 capsule by mouth Daily. 2.000 mcg     • Turmeric 500 MG capsule Take 400 mg by mouth Daily.     • Synthroid 50 MCG tablet Take 1 tablet by mouth Daily. 90 tablet 1   • [DISCONTINUED] Synthroid 75 MCG tablet Take 1 tablet by mouth Daily. 30 tablet 5   • [DISCONTINUED] vitamin E 400 UNIT capsule Take 400 Units by mouth Daily.       No facility-administered encounter medications on file as of 7/22/2022.       Reviewed use of high risk medication in the elderly: yes  Reviewed for potential of harmful drug interactions in the elderly: yes    Follow Up:  Return in about 6 months (around 1/22/2023) for RECHECK 30MIN.     Patient Instructions     Critical care medicine: Principles of diagnosis and management in the adult (4th ed., pp. 9265-9865). Jorgensen.\"> Brown's anesthesia (8th ed., pp. 232-250). Jorgensen.\">   Advance Directive    Advance directives are legal documents that allow you to make decisions about your health care and medical treatment in case you become unable to communicate for yourself. Advance directives let your wishes be known to family, friends, and health care providers.  Discussing and writing advance directives should happen over time rather than all at once. Advance directives can be changed and updated at any time. There are different types of advance directives, such as:  · Medical power of .  · Living will.  · Do not resuscitate (DNR) order or do not attempt resuscitation (DNAR) order.  Health care proxy and medical power of   A health care proxy is also called a health care agent. This person is appointed to make medical decisions for you when you are unable to make decisions for yourself. Generally, people ask a trusted friend or family member to act as their proxy and represent their preferences. Make sure you have an agreement with your trusted person to act as your proxy. A proxy may have to make a medical decision on your behalf if your wishes are not " known.  A medical power of , also called a durable power of  for health care, is a legal document that names your health care proxy. Depending on the laws in your state, the document may need to be:  · Signed.  · Notarized.  · Dated.  · Copied.  · Witnessed.  · Incorporated into your medical record.  You may also want to appoint a trusted person to manage your money in the event you are unable to do so. This is called a durable power of  for finances. It is a separate legal document from the durable power of  for health care. You may choose your health care proxy or someone different to act as your agent in money matters.  If you do not appoint a proxy, or there is a concern that the proxy is not acting in your best interest, a court may appoint a guardian to act on your behalf.  Living will  A living will is a set of instructions that state your wishes about medical care when you cannot express them yourself. Health care providers should keep a copy of your living will in your medical record. You may want to give a copy to family members or friends. To alert caregivers in case of an emergency, you can place a card in your wallet to let them know that you have a living will and where they can find it. A living will is used if you become:  · Terminally ill.  · Disabled.  · Unable to communicate or make decisions.  The following decisions should be included in your living will:  · To use or not to use life support equipment, such as dialysis machines and breathing machines (ventilators).  · Whether you want a DNR or DNAR order. This tells health care providers not to use cardiopulmonary resuscitation (CPR) if breathing or heartbeat stops.  · To use or not to use tube feeding.  · To be given or not to be given food and fluids.  · Whether you want comfort (palliative) care when the goal becomes comfort rather than a cure.  · Whether you want to donate your organs and tissues.  A living  will does not give instructions for distributing your money and property if you should pass away.  DNR or DNAR  A DNR or DNAR order is a request not to have CPR in the event that your heart stops beating or you stop breathing. If a DNR or DNAR order has not been made and shared, a health care provider will try to help any patient whose heart has stopped or who has stopped breathing. If you plan to have surgery, talk with your health care provider about how your DNR or DNAR order will be followed if problems occur.  What if I do not have an advance directive?  Some states assign family decision makers to act on your behalf if you do not have an advance directive. Each state has its own laws about advance directives. You may want to check with your health care provider, , or state representative about the laws in your state.  Summary  · Advance directives are legal documents that allow you to make decisions about your health care and medical treatment in case you become unable to communicate for yourself.  · The process of discussing and writing advance directives should happen over time. You can change and update advance directives at any time.  · Advance directives may include a medical power of , a living will, and a DNR or DNAR order.  This information is not intended to replace advice given to you by your health care provider. Make sure you discuss any questions you have with your health care provider.  Document Revised: 2021 Document Reviewed: 2021  [employer] Patient Education ©  [employer]    Medicare Wellness  Personal Prevention Plan of Service     Date of Office Visit:    Encounter Provider:  Radha Cooney PA-C  Place of Service:  [facility] INTERNAL MEDICINE  Patient Name: Justine Fernandes  :  1955    As part of the Medicare Wellness portion of your visit today, we are providing you with this personalized preventive plan of services (PPPS).  This plan is based upon recommendations of the [place] Preventive Services Task Force (USPSTF) and the Advisory Committee on Immunization Practices (ACIP).    This lists the preventive care services that should be considered, and provides dates of when you are due. Items listed as completed are up-to-date and do not require any further intervention.    Health Maintenance   Topic Date Due   • URINE MICROALBUMIN  Never done   • DXA SCAN  Never done   • COLORECTAL CANCER SCREENING  Never done   • Pneumococcal Vaccine 65+ (1 - PCV) Never done   • TDAP/TD VACCINES (2 - Td or Tdap) 03/27/2019   • HEPATITIS C SCREENING  Never done   • ANNUAL WELLNESS VISIT  Never done   • DIABETIC FOOT EXAM  Never done   • PAP SMEAR  Never done   • DIABETIC EYE EXAM  Never done   • COVID-19 Vaccine (3 - Booster for Moderna series) 09/18/2021   • INFLUENZA VACCINE  10/01/2022   • HEMOGLOBIN A1C  12/17/2022   • MAMMOGRAM  06/23/2024   • ZOSTER VACCINE  Completed       No orders of the defined types were placed in this encounter.      No follow-ups on file.        BMI for Adults  What is BMI?  Body mass index (BMI) is a number that is calculated from a person's weight and height. BMI can help estimate how much of a person's weight is composed of fat. BMI does not measure body fat directly. Rather, it is an alternative to procedures that directly measure body fat, which can be difficult and expensive.  BMI can help identify people who may be at higher risk for certain medical problems.  What are BMI measurements used for?  BMI is used as a screening tool to identify possible weight problems. It helps determine whether a person is obese, overweight, a healthy weight, or underweight.  BMI is useful for:  · Identifying a weight problem that may be related to a medical condition or may increase the risk for medical problems.  · Promoting changes, such as changes in diet and exercise, to help reach a healthy weight. BMI screening can be  "repeated to see if these changes are working.  How is BMI calculated?  BMI involves measuring your weight in relation to your height. Both height and weight are measured, and the BMI is calculated from those numbers. This can be done either in English (U.S[place] or metric measurements. Note that charts and online BMI calculators are available to help you find your BMI quickly and easily without having to do these calculations yourself.  To calculate your BMI in English (U.S[place] measurements:    1. Measure your weight in pounds (lb).  2. Multiply the number of pounds by 703.  ? For example, for a person who weighs 180 lb, multiply that number by 703, which equals 126,540.  3. Measure your height in inches. Then multiply that number by itself to get a measurement called \"inches squared.\"  ? For example, for a person who is 70 inches tall, the \"inches squared\" measurement is 70 inches x 70 inches, which equals 4,900 inches squared.  4. Divide the total from step 2 (number of lb x 703) by the total from step 3 (inches squared): 126,540 ÷ 4,900 = 25.8. This is your BMI.    To calculate your BMI in metric measurements:  1. Measure your weight in kilograms (kg).  2. Measure your height in meters (m). Then multiply that number by itself to get a measurement called \"meters squared.\"  ? For example, for a person who is 1.75 m tall, the \"meters squared\" measurement is 1.75 m x 1.75 m, which is equal to 3.1 meters squared.  3. Divide the number of kilograms (your weight) by the meters squared number. In this example: 70 ÷ 3.1 = 22.6. This is your BMI.  What do the results mean?  BMI charts are used to identify whether you are underweight, normal weight, overweight, or obese. The following guidelines will be used:  · Underweight: BMI less than 18.5.  · Normal weight: BMI between 18.5 and 24.9.  · Overweight: BMI between 25 and 29.9.  · Obese: BMI of 30 or above.  Keep these notes in mind:  · Weight includes both fat and muscle, so " someone with a muscular build, such as an athlete, may have a BMI that is higher than 24.9. In cases like these, BMI is not an accurate measure of body fat.  · To determine if excess body fat is the cause of a BMI of 25 or higher, further assessments may need to be done by a health care provider.  · BMI is usually interpreted in the same way for men and women.  Where to find more information  For more information about BMI, including tools to quickly calculate your BMI, go to these websites:  · Centers for Disease Control and Prevention: www.cdc.gov  · American Heart Association: www.heart.org  · National Heart, Lung, and Blood [employer]: www.nhlbi.nih.gov  Summary  · Body mass index (BMI) is a number that is calculated from a person's weight and height.  · BMI may help estimate how much of a person's weight is composed of fat. BMI can help identify those who may be at higher risk for certain medical problems.  · BMI can be measured using English measurements or metric measurements.  · BMI charts are used to identify whether you are underweight, normal weight, overweight, or obese.  This information is not intended to replace advice given to you by your health care provider. Make sure you discuss any questions you have with your health care provider.  Document Revised: 09/09/2020 Document Reviewed: 07/17/2020  [employer] Patient Education © 2021 [employer]        An After Visit Summary and PPPS with all of these plans were given to the patient.           I spent 34 minutes caring for Justine on this date of service. This time includes time spent by me in the following activities:preparing for the visit, reviewing tests, obtaining and/or reviewing a separately obtained history, performing a medically appropriate examination and/or evaluation , counseling and educating the patient/family/caregiver, ordering medications, tests, or procedures, referring and communicating with other health care professionals  and  documenting information in the medical record     Transcribed from ambient dictation for Radha Cooney PA-C by MANAN PAPPAS.  07/22/22   14:39 EDT    Patient verbalized consent to the visit recording.  I have personally performed the services described in this document as transcribed by the above individual, and it is both accurate and complete.  Radha Cooney PA-C  7/23/2022  06:45 EDT

## 2022-07-22 NOTE — PATIENT INSTRUCTIONS
"Critical care medicine: Principles of diagnosis and management in the adult (4th ed., pp. 7469-4878). [employer].\"> Brown's anesthesia (8th ed., pp. 232-250). [employer]ensen.\">   Advance Directive    Advance directives are legal documents that allow you to make decisions about your health care and medical treatment in case you become unable to communicate for yourself. Advance directives let your wishes be known to family, friends, and health care providers.  Discussing and writing advance directives should happen over time rather than all at once. Advance directives can be changed and updated at any time. There are different types of advance directives, such as:  Medical power of .  Living will.  Do not resuscitate (DNR) order or do not attempt resuscitation (DNAR) order.  Health care proxy and medical power of   A health care proxy is also called a health care agent. This person is appointed to make medical decisions for you when you are unable to make decisions for yourself. Generally, people ask a trusted friend or family member to act as their proxy and represent their preferences. Make sure you have an agreement with your trusted person to act as your proxy. A proxy may have to make a medical decision on your behalf if your wishes are not known.  A medical power of , also called a durable power of  for health care, is a legal document that names your health care proxy. Depending on the laws in your state, the document may need to be:  Signed.  Notarized.  Dated.  Copied.  Witnessed.  Incorporated into your medical record.  You may also want to appoint a trusted person to manage your money in the event you are unable to do so. This is called a durable power of  for finances. It is a separate legal document from the durable power of  for health care. You may choose your health care proxy or someone different to act as your agent in money matters.  If you do not appoint a " proxy, or there is a concern that the proxy is not acting in your best interest, a court may appoint a guardian to act on your behalf.  Living will  A living will is a set of instructions that state your wishes about medical care when you cannot express them yourself. Health care providers should keep a copy of your living will in your medical record. You may want to give a copy to family members or friends. To alert caregivers in case of an emergency, you can place a card in your wallet to let them know that you have a living will and where they can find it. A living will is used if you become:  Terminally ill.  Disabled.  Unable to communicate or make decisions.  The following decisions should be included in your living will:  To use or not to use life support equipment, such as dialysis machines and breathing machines (ventilators).  Whether you want a DNR or DNAR order. This tells health care providers not to use cardiopulmonary resuscitation (CPR) if breathing or heartbeat stops.  To use or not to use tube feeding.  To be given or not to be given food and fluids.  Whether you want comfort (palliative) care when the goal becomes comfort rather than a cure.  Whether you want to donate your organs and tissues.  A living will does not give instructions for distributing your money and property if you should pass away.  DNR or DNAR  A DNR or DNAR order is a request not to have CPR in the event that your heart stops beating or you stop breathing. If a DNR or DNAR order has not been made and shared, a health care provider will try to help any patient whose heart has stopped or who has stopped breathing. If you plan to have surgery, talk with your health care provider about how your DNR or DNAR order will be followed if problems occur.  What if I do not have an advance directive?  Some states assign family decision makers to act on your behalf if you do not have an advance directive. Each state has its own laws about  advance directives. You may want to check with your health care provider, , or state representative about the laws in your state.  Summary  Advance directives are legal documents that allow you to make decisions about your health care and medical treatment in case you become unable to communicate for yourself.  The process of discussing and writing advance directives should happen over time. You can change and update advance directives at any time.  Advance directives may include a medical power of , a living will, and a DNR or DNAR order.  This information is not intended to replace advice given to you by your health care provider. Make sure you discuss any questions you have with your health care provider.  Document Revised: 2021 Document Reviewed: 2021  [employer] Patient Education ©  [employer].    Medicare Wellness  Personal Prevention Plan of Service     Date of Office Visit:    Encounter Provider:  Radha Cooney PA-C  Place of Service:  [facility]  Patient Name: Justine Fernandes  :  1955    As part of the Medicare Wellness portion of your visit today, we are providing you with this personalized preventive plan of services (PPPS). This plan is based upon recommendations of the [place] Preventive Services Task Force (USPSTF) and the Advisory Committee on Immunization Practices (ACIP).    This lists the preventive care services that should be considered, and provides dates of when you are due. Items listed as completed are up-to-date and do not require any further intervention.    Health Maintenance   Topic Date Due    URINE MICROALBUMIN  Never done    DXA SCAN  Never done    COLORECTAL CANCER SCREENING  Never done    Pneumococcal Vaccine 65+ (1 - PCV) Never done    TDAP/TD VACCINES (2 - Td or Tdap) 2019    HEPATITIS C SCREENING  Never done    ANNUAL WELLNESS VISIT  Never done    DIABETIC FOOT EXAM  Never done    PAP  SMEAR  Never done    DIABETIC EYE EXAM  Never done    COVID-19 Vaccine (3 - Booster for Moderna series) 09/18/2021    INFLUENZA VACCINE  10/01/2022    HEMOGLOBIN A1C  12/17/2022    MAMMOGRAM  06/23/2024    ZOSTER VACCINE  Completed       No orders of the defined types were placed in this encounter.      No follow-ups on file.        BMI for Adults  What is BMI?  Body mass index (BMI) is a number that is calculated from a person's weight and height. BMI can help estimate how much of a person's weight is composed of fat. BMI does not measure body fat directly. Rather, it is an alternative to procedures that directly measure body fat, which can be difficult and expensive.  BMI can help identify people who may be at higher risk for certain medical problems.  What are BMI measurements used for?  BMI is used as a screening tool to identify possible weight problems. It helps determine whether a person is obese, overweight, a healthy weight, or underweight.  BMI is useful for:  Identifying a weight problem that may be related to a medical condition or may increase the risk for medical problems.  Promoting changes, such as changes in diet and exercise, to help reach a healthy weight. BMI screening can be repeated to see if these changes are working.  How is BMI calculated?  BMI involves measuring your weight in relation to your height. Both height and weight are measured, and the BMI is calculated from those numbers. This can be done either in English ([place] or metric measurements. Note that charts and online BMI calculators are available to help you find your BMI quickly and easily without having to do these calculations yourself.  To calculate your BMI in English ([place] measurements:    Measure your weight in pounds (lb).  Multiply the number of pounds by 703.  For example, for a person who weighs 180 lb, multiply that number by 703, which equals [phone],540.  Measure your height in inches. Then multiply that number by itself  "to get a measurement called \"inches squared.\"  For example, for a person who is 70 inches tall, the \"inches squared\" measurement is 70 inches x 70 inches, which equals 4,900 inches squared.  Divide the total from step 2 (number of lb x 703) by the total from step 3 (inches squared): 126,540 ÷ 4,900 = 25.8. This is your BMI.    To calculate your BMI in metric measurements:  Measure your weight in kilograms (kg).  Measure your height in meters (m). Then multiply that number by itself to get a measurement called \"meters squared.\"  For example, for a person who is 1.75 m tall, the \"meters squared\" measurement is 1.75 m x 1.75 m, which is equal to 3.1 meters squared.  Divide the number of kilograms (your weight) by the meters squared number. In this example: 70 ÷ 3.1 = 22.6. This is your BMI.  What do the results mean?  BMI charts are used to identify whether you are underweight, normal weight, overweight, or obese. The following guidelines will be used:  Underweight: BMI less than 18.5.  Normal weight: BMI between 18.5 and 24.9.  Overweight: BMI between 25 and 29.9.  Obese: BMI of 30 or above.  Keep these notes in mind:  Weight includes both fat and muscle, so someone with a muscular build, such as an athlete, may have a BMI that is higher than 24.9. In cases like these, BMI is not an accurate measure of body fat.  To determine if excess body fat is the cause of a BMI of 25 or higher, further assessments may need to be done by a health care provider.  BMI is usually interpreted in the same way for men and women.  Where to find more information  For more information about BMI, including tools to quickly calculate your BMI, go to these websites:  Centers for Disease Control and Prevention: www.cdc.gov  American Heart Association: www.heart.org  National Heart, Lung, and Blood [place]: www.nhlbi.nih.gov  Summary  Body mass index (BMI) is a number that is calculated from a person's weight and height.  BMI may help " estimate how much of a person's weight is composed of fat. BMI can help identify those who may be at higher risk for certain medical problems.  BMI can be measured using English measurements or metric measurements.  BMI charts are used to identify whether you are underweight, normal weight, overweight, or obese.  This information is not intended to replace advice given to you by your health care provider. Make sure you discuss any questions you have with your health care provider.  Document Revised: 09/09/2020 Document Reviewed: 07/17/2020  Elsevier Patient Education © 2021 [employer]vier Inc.

## 2022-12-20 ENCOUNTER — OFFICE VISIT (OUTPATIENT)
Dept: CARDIOLOGY | Facility: CLINIC | Age: 67
End: 2022-12-20

## 2022-12-20 VITALS
SYSTOLIC BLOOD PRESSURE: 110 MMHG | HEART RATE: 76 BPM | BODY MASS INDEX: 35.7 KG/M2 | WEIGHT: 194 LBS | DIASTOLIC BLOOD PRESSURE: 76 MMHG | OXYGEN SATURATION: 95 % | HEIGHT: 62 IN

## 2022-12-20 DIAGNOSIS — R06.02 SHORTNESS OF BREATH: ICD-10-CM

## 2022-12-20 DIAGNOSIS — I42.2 ASYMMETRIC SEPTAL HYPERTROPHY: Primary | ICD-10-CM

## 2022-12-20 DIAGNOSIS — R53.83 OTHER FATIGUE: ICD-10-CM

## 2022-12-20 PROCEDURE — 99213 OFFICE O/P EST LOW 20 MIN: CPT | Performed by: PHYSICIAN ASSISTANT

## 2022-12-20 RX ORDER — PANTOPRAZOLE SODIUM 40 MG/1
40 TABLET, DELAYED RELEASE ORAL DAILY
COMMUNITY
End: 2023-01-27 | Stop reason: DRUGHIGH

## 2022-12-20 NOTE — PROGRESS NOTES
Subjective   Justine Fernandes is a 67 y.o. female     Chief Complaint   Patient presents with   • Follow-up   • Chest Pain       HPI  The patient presents back for evaluation follow-up after review through  services.  She was referred on to Dr. Junior for hypertrophic cardiomyopathy to see if she was a candidate for intervention.  Her initial echo had supported severe asymmetric septal hypertrophy with interventricular septum at that time noted at 2.4 cm, with LV outflow tract gradient with provocation at 130 mmHg.  EM was present.  She was seen and scheduled for cardiac MRI and genetic testing with that service.  Cardiac MRI indicated maximal wall thickness at 28 mm in the basal anteroseptum.  Maximum velocity in the LV outflow tract was a 1.9 m/s.  She was started on Camzyos and follow-up echocardiograms have supported stability.  She is pending follow-up with  clinic in that regard.  Follow-up imaging will be maintained through that service.  Beta-blocker therapy is also been instituted and increase.  She is having difficulty tolerating that because of marked fatigue, weakness, and symptoms otherwise.  She reports that a lot of her chest pain and dyspnea has minimized on current medical regimen.  She has no failure symptoms.  She has no palpitations, dizziness, or syncope.  She has no further complaints.      Current Outpatient Medications   Medication Sig Dispense Refill   • albuterol sulfate  (90 Base) MCG/ACT inhaler ProAir HFA 90 mcg/actuation aerosol inhaler   Every six hours     • escitalopram (LEXAPRO) 10 MG tablet Take 10 mg by mouth Daily.     • levothyroxine (SYNTHROID, LEVOTHROID) 50 MCG tablet      • metFORMIN (GLUCOPHAGE) 500 MG tablet Take 500 mg by mouth Daily With Breakfast.     • metoprolol tartrate (LOPRESSOR) 50 MG tablet Take 50 mg by mouth.     • pantoprazole (PROTONIX) 40 MG EC tablet Take 40 mg by mouth Daily.     • Synthroid 50 MCG tablet Take 1 tablet by mouth Daily. 90 tablet 1      No current facility-administered medications for this visit.       Patient has no known allergies.    Past Medical History:   Diagnosis Date   • Anxiety    • Asthma     Mild    • Bundle branch block    • Circulation disorder of lower extremity     legs   • Cirrhosis, nonalcoholic (HCC)    • Diabetes mellitus (HCC)    • Hypothyroid    • Sleep apnea        Social History     Socioeconomic History   • Marital status:    Tobacco Use   • Smoking status: Never   • Smokeless tobacco: Never   Substance and Sexual Activity   • Alcohol use: Never   • Drug use: Never   • Sexual activity: Defer       Family History   Problem Relation Age of Onset   • Heart disease Mother    • Cancer Mother    • Colon cancer Mother    • Arthritis Mother    • Angina Mother    • Osteoporosis Mother    • Hyperlipidemia Mother    • Stroke Father    • Colon cancer Father    • Heart attack Father    • Lung cancer Father    • Cancer Sister    • Migraines Sister    • Cancer Sister    • Cancer Brother    • Arthritis Brother    • Hypertension Brother    • Cancer Brother    • Diabetes Brother        Review of Systems   Constitutional: Negative.  Negative for activity change, appetite change, chills, fatigue and fever.   HENT: Negative.  Negative for congestion.    Eyes: Negative.  Negative for visual disturbance.   Respiratory: Positive for chest tightness. Negative for apnea, cough, shortness of breath and wheezing.    Cardiovascular: Positive for chest pain. Negative for palpitations and leg swelling.   Gastrointestinal: Negative.  Negative for blood in stool.   Endocrine: Negative.  Negative for cold intolerance and heat intolerance.   Genitourinary: Negative.  Negative for hematuria.   Musculoskeletal: Positive for neck pain. Negative for arthralgias, back pain, gait problem, joint swelling and neck stiffness.   Skin: Negative.  Negative for color change, rash and wound.   Allergic/Immunologic: Negative.  Negative for environmental  "allergies and food allergies.   Neurological: Negative.  Negative for dizziness, syncope, weakness, light-headedness, numbness and headaches.   Hematological: Negative.  Does not bruise/bleed easily.   Psychiatric/Behavioral: Negative.  Negative for sleep disturbance.       Objective     Vitals:    12/20/22 1322   BP: 110/76   BP Location: Left arm   Patient Position: Sitting   Cuff Size: Adult   Pulse: 76   SpO2: 95%   Weight: 88 kg (194 lb)   Height: 157.5 cm (62\")        /76 (BP Location: Left arm, Patient Position: Sitting, Cuff Size: Adult)   Pulse 76   Ht 157.5 cm (62\")   Wt 88 kg (194 lb)   SpO2 95%   BMI 35.48 kg/m²      Lab Results (most recent)     None          Physical Exam  Vitals and nursing note reviewed.   Constitutional:       General: She is not in acute distress.     Appearance: She is well-developed.   HENT:      Head: Normocephalic and atraumatic.   Eyes:      Conjunctiva/sclera: Conjunctivae normal.      Pupils: Pupils are equal, round, and reactive to light.   Neck:      Vascular: No JVD.      Trachea: No tracheal deviation.   Cardiovascular:      Rate and Rhythm: Normal rate and regular rhythm.      Heart sounds: Normal heart sounds.      Comments: 1/6 to 2/6 systolic murmur at second right or costal space tender lower left sternal border noted.  Pulmonary:      Effort: Pulmonary effort is normal.      Breath sounds: Normal breath sounds.   Abdominal:      General: Bowel sounds are normal. There is no distension.      Palpations: Abdomen is soft. There is no mass.      Tenderness: There is no abdominal tenderness. There is no guarding or rebound.   Musculoskeletal:         General: No tenderness or deformity. Normal range of motion.      Cervical back: Normal range of motion and neck supple.   Skin:     General: Skin is warm and dry.      Coloration: Skin is not pale.      Findings: No erythema or rash.   Neurological:      Mental Status: She is alert and oriented to person, place, " and time.   Psychiatric:         Behavior: Behavior normal.         Thought Content: Thought content normal.         Judgment: Judgment normal.         Procedure   Procedures         Assessment & Plan      Diagnosis Plan   1. Asymmetric septal hypertrophy (HCC)        2. Shortness of breath        3. Other fatigue          1.  She follows with  clinic regarding her hypertrophic cardiomyopathy.  She has been placed on medical regimen as outlined above.  Since being on beta-blocker therapy and Camzyos, she feels improvement.  She reports that her chest pain is minimized.  She seems to have less dyspnea and more energy.  Follow-up cardiac MRIs and echo studies have supported stability for this patient.  She will continue to follow through  clinic.  We will make no adjustments at this time and will follow from the periphery.    2.  For change in clinical course she will report back to the clinic or to  immediately.    3.  We will make no adjustments in medications and will anticipate seeing her on 6-month intervals.           Patient did not bring med list or medicine bottles to appointment, med list has been reviewed and updated based on patient's knowledge of their meds.     Advance Care Planning   ACP discussion was declined by the patient. Patient has an advance directive in EMR which is still valid.            Electronically signed by:

## 2023-01-19 DIAGNOSIS — E03.9 ACQUIRED HYPOTHYROIDISM: ICD-10-CM

## 2023-01-19 DIAGNOSIS — E11.9 TYPE 2 DIABETES MELLITUS WITHOUT COMPLICATION, WITHOUT LONG-TERM CURRENT USE OF INSULIN: Primary | ICD-10-CM

## 2023-01-19 DIAGNOSIS — Z13.220 LIPID SCREENING: ICD-10-CM

## 2023-01-19 DIAGNOSIS — Z13.21 ENCOUNTER FOR VITAMIN DEFICIENCY SCREENING: ICD-10-CM

## 2023-01-19 DIAGNOSIS — E55.9 VITAMIN D DEFICIENCY: ICD-10-CM

## 2023-01-20 ENCOUNTER — LAB (OUTPATIENT)
Dept: LAB | Facility: HOSPITAL | Age: 68
End: 2023-01-20
Payer: MEDICARE

## 2023-01-20 DIAGNOSIS — Z13.220 LIPID SCREENING: ICD-10-CM

## 2023-01-20 DIAGNOSIS — Z13.21 ENCOUNTER FOR VITAMIN DEFICIENCY SCREENING: ICD-10-CM

## 2023-01-20 DIAGNOSIS — E11.9 TYPE 2 DIABETES MELLITUS WITHOUT COMPLICATION, WITHOUT LONG-TERM CURRENT USE OF INSULIN: ICD-10-CM

## 2023-01-20 DIAGNOSIS — E55.9 VITAMIN D DEFICIENCY: ICD-10-CM

## 2023-01-20 DIAGNOSIS — E03.9 ACQUIRED HYPOTHYROIDISM: ICD-10-CM

## 2023-01-20 PROCEDURE — 85025 COMPLETE CBC W/AUTO DIFF WBC: CPT

## 2023-01-20 PROCEDURE — 80053 COMPREHEN METABOLIC PANEL: CPT

## 2023-01-20 PROCEDURE — 82607 VITAMIN B-12: CPT

## 2023-01-20 PROCEDURE — 84439 ASSAY OF FREE THYROXINE: CPT

## 2023-01-20 PROCEDURE — 83036 HEMOGLOBIN GLYCOSYLATED A1C: CPT

## 2023-01-20 PROCEDURE — 36415 COLL VENOUS BLD VENIPUNCTURE: CPT

## 2023-01-20 PROCEDURE — 82306 VITAMIN D 25 HYDROXY: CPT

## 2023-01-20 PROCEDURE — 80061 LIPID PANEL: CPT

## 2023-01-20 PROCEDURE — 84443 ASSAY THYROID STIM HORMONE: CPT

## 2023-01-20 PROCEDURE — 84481 FREE ASSAY (FT-3): CPT

## 2023-01-21 LAB
25(OH)D3 SERPL-MCNC: 60.3 NG/ML (ref 30–100)
ALBUMIN SERPL-MCNC: 4.4 G/DL (ref 3.5–5.2)
ALBUMIN/GLOB SERPL: 1.5 G/DL
ALP SERPL-CCNC: 65 U/L (ref 39–117)
ALT SERPL W P-5'-P-CCNC: 31 U/L (ref 1–33)
ANION GAP SERPL CALCULATED.3IONS-SCNC: 11.5 MMOL/L (ref 5–15)
AST SERPL-CCNC: 45 U/L (ref 1–32)
BASOPHILS # BLD AUTO: 0.02 10*3/MM3 (ref 0–0.2)
BASOPHILS NFR BLD AUTO: 0.9 % (ref 0–1.5)
BILIRUB SERPL-MCNC: 1.5 MG/DL (ref 0–1.2)
BUN SERPL-MCNC: 15 MG/DL (ref 8–23)
BUN/CREAT SERPL: 16.1 (ref 7–25)
CALCIUM SPEC-SCNC: 9.9 MG/DL (ref 8.6–10.5)
CHLORIDE SERPL-SCNC: 104 MMOL/L (ref 98–107)
CHOLEST SERPL-MCNC: 189 MG/DL (ref 0–200)
CO2 SERPL-SCNC: 24.5 MMOL/L (ref 22–29)
CREAT SERPL-MCNC: 0.93 MG/DL (ref 0.57–1)
DEPRECATED RDW RBC AUTO: 46.1 FL (ref 37–54)
EGFRCR SERPLBLD CKD-EPI 2021: 68.3 ML/MIN/1.73
EOSINOPHIL # BLD AUTO: 0.1 10*3/MM3 (ref 0–0.4)
EOSINOPHIL NFR BLD AUTO: 4.4 % (ref 0.3–6.2)
ERYTHROCYTE [DISTWIDTH] IN BLOOD BY AUTOMATED COUNT: 14 % (ref 12.3–15.4)
GLOBULIN UR ELPH-MCNC: 2.9 GM/DL
GLUCOSE SERPL-MCNC: 114 MG/DL (ref 65–99)
HBA1C MFR BLD: 6.3 % (ref 4.8–5.6)
HCT VFR BLD AUTO: 41 % (ref 34–46.6)
HDLC SERPL-MCNC: 49 MG/DL (ref 40–60)
HGB BLD-MCNC: 14.1 G/DL (ref 12–15.9)
LDLC SERPL CALC-MCNC: 111 MG/DL (ref 0–100)
LDLC/HDLC SERPL: 2.18 {RATIO}
LYMPHOCYTES # BLD AUTO: 0.64 10*3/MM3 (ref 0.7–3.1)
LYMPHOCYTES NFR BLD AUTO: 28.1 % (ref 19.6–45.3)
MCH RBC QN AUTO: 31.3 PG (ref 26.6–33)
MCHC RBC AUTO-ENTMCNC: 34.4 G/DL (ref 31.5–35.7)
MCV RBC AUTO: 90.9 FL (ref 79–97)
MONOCYTES # BLD AUTO: 0.17 10*3/MM3 (ref 0.1–0.9)
MONOCYTES NFR BLD AUTO: 7.5 % (ref 5–12)
NEUTROPHILS NFR BLD AUTO: 1.35 10*3/MM3 (ref 1.7–7)
NEUTROPHILS NFR BLD AUTO: 59.1 % (ref 42.7–76)
PLATELET # BLD AUTO: 94 10*3/MM3 (ref 140–450)
PMV BLD AUTO: 11 FL (ref 6–12)
POTASSIUM SERPL-SCNC: 4.4 MMOL/L (ref 3.5–5.2)
PROT SERPL-MCNC: 7.3 G/DL (ref 6–8.5)
RBC # BLD AUTO: 4.51 10*6/MM3 (ref 3.77–5.28)
SODIUM SERPL-SCNC: 140 MMOL/L (ref 136–145)
T3FREE SERPL-MCNC: 2.85 PG/ML (ref 2–4.4)
T4 FREE SERPL-MCNC: 1.16 NG/DL (ref 0.93–1.7)
TRIGL SERPL-MCNC: 165 MG/DL (ref 0–150)
TSH SERPL DL<=0.05 MIU/L-ACNC: 2.28 UIU/ML (ref 0.27–4.2)
VIT B12 BLD-MCNC: 706 PG/ML (ref 211–946)
VLDLC SERPL-MCNC: 29 MG/DL (ref 5–40)
WBC NRBC COR # BLD: 2.28 10*3/MM3 (ref 3.4–10.8)

## 2023-01-27 ENCOUNTER — OFFICE VISIT (OUTPATIENT)
Dept: INTERNAL MEDICINE | Facility: CLINIC | Age: 68
End: 2023-01-27
Payer: MEDICARE

## 2023-01-27 VITALS
HEART RATE: 78 BPM | TEMPERATURE: 98.2 F | SYSTOLIC BLOOD PRESSURE: 102 MMHG | DIASTOLIC BLOOD PRESSURE: 72 MMHG | WEIGHT: 195 LBS | BODY MASS INDEX: 35.88 KG/M2 | OXYGEN SATURATION: 98 % | HEIGHT: 62 IN

## 2023-01-27 DIAGNOSIS — Q24.9 CONGENITAL HEART DEFECT: Primary | ICD-10-CM

## 2023-01-27 DIAGNOSIS — R53.83 FATIGUE, UNSPECIFIED TYPE: ICD-10-CM

## 2023-01-27 DIAGNOSIS — E03.9 ACQUIRED HYPOTHYROIDISM: ICD-10-CM

## 2023-01-27 DIAGNOSIS — R23.8 PAPULES: ICD-10-CM

## 2023-01-27 DIAGNOSIS — E11.9 TYPE 2 DIABETES MELLITUS WITHOUT COMPLICATION, WITHOUT LONG-TERM CURRENT USE OF INSULIN: ICD-10-CM

## 2023-01-27 DIAGNOSIS — F41.9 ANXIETY: ICD-10-CM

## 2023-01-27 PROCEDURE — 3044F HG A1C LEVEL LT 7.0%: CPT | Performed by: PHYSICIAN ASSISTANT

## 2023-01-27 PROCEDURE — 99214 OFFICE O/P EST MOD 30 MIN: CPT | Performed by: PHYSICIAN ASSISTANT

## 2023-01-27 RX ORDER — MAVACAMTEN 2.5 MG/1
CAPSULE, GELATIN COATED ORAL
COMMUNITY
Start: 2023-01-10

## 2023-01-27 RX ORDER — METOPROLOL SUCCINATE 25 MG/1
TABLET, EXTENDED RELEASE ORAL
COMMUNITY
Start: 2023-01-10

## 2023-01-27 RX ORDER — ESCITALOPRAM OXALATE 10 MG/1
10 TABLET ORAL EVERY MORNING
Qty: 90 TABLET | Refills: 1 | Status: SHIPPED | OUTPATIENT
Start: 2023-01-27

## 2023-01-27 RX ORDER — LEVOTHYROXINE SODIUM 50 MCG
50 TABLET ORAL DAILY
Qty: 90 TABLET | Refills: 1 | Status: SHIPPED | OUTPATIENT
Start: 2023-01-27

## 2023-01-27 NOTE — PROGRESS NOTES
Answers for HPI/ROS submitted by the patient on 1/26/2023  Please describe your symptoms.: this visit is to review bloodwork and adjust RX if necessary  Have you had these symptoms before?: Yes  How long have you been having these symptoms?: Greater than 2 weeks  Please list any medications you are currently taking for this condition.: n/a  Please describe any probable cause for these symptoms. : n/a  What is the primary reason for your visit?: Other    [facility]    Justine Fernandes  1955   [id number]      Patient Care Team:  Radha Cooney PA-C as PCP - General (Physician Assistant)  Magdiel Junior MD as Consulting Physician (Cardiology)  Roscoe Holley DO as Consulting Physician (Gastroenterology)  Royal Briceño MD as Consulting Physician (Hematology and Oncology)  Mable Ortega APRN as Nurse Practitioner (Family Medicine)    Chief Complaint::   Chief Complaint   Patient presents with   • lab work follow up    • medication review             HPI    Justine Fernandes is a 67 year old female presenting today for follow up. Past medical history significant for congenital heart defect, hypothyroidism, leukopenia, and anxiety. She goes for an echo every month at  and they change her Camzyos medication depending on her echo results. This medication has been working well for her and has improved her chest pain. She was taking metoprolol but this caused her blood pressure to drop too low and she was extremely fatigued so this dose was decreased which has worked well for her. She states she has more energy now. For the past few months she has noticed flesh colored bumps on her face. She saw dermatology for this issue and was told it was completely benign, but that removing these for cosmetic purposes may cause scarring. She denies any recent illnesses, fever, chills, chest pain, or palpitations.     Patient Active Problem List   Diagnosis   • Anxiety   • Type 2  diabetes mellitus without complication, without long-term current use of insulin (HCC)   • Congenital heart defect   • Sleep apnea   • Leukopenia   • Hypothyroidism   • Fatigue   • Other cirrhosis of liver (HCC)   • Platelets decreased (HCC)   • Welcome to Medicare preventive visit   • Papules        Past Medical History:   Diagnosis Date   • Anxiety    • Asthma     Mild    • Bundle branch block    • Circulation disorder of lower extremity     legs   • Cirrhosis, nonalcoholic (HCC)    • Diabetes mellitus (HCC)    • Hypothyroid    • Sleep apnea        Past Surgical History:   Procedure Laterality Date   • BLADDER SURGERY  1973   • CARPAL TUNNEL RELEASE  2018    right    • KIDNEY STONE SURGERY  1994   • KNEE SURGERY  2019    meniscus repair- left knee   • NASAL POLYP SURGERY  2006   • NASAL SEPTUM SURGERY  1999   • REPLACEMENT TOTAL HIP LATERAL POSITION  10/02/2020   • TONSILLECTOMY AND ADENOIDECTOMY     • TUBAL ABDOMINAL LIGATION     • VARICOSE VEIN SURGERY  2006    Right leg        Family History   Problem Relation Age of Onset   • Heart disease Mother    • Cancer Mother    • Colon cancer Mother    • Arthritis Mother    • Angina Mother    • Osteoporosis Mother    • Hyperlipidemia Mother    • Stroke Father    • Colon cancer Father    • Heart attack Father    • Lung cancer Father    • Cancer Sister    • Migraines Sister    • Cancer Sister    • Cancer Brother    • Arthritis Brother    • Hypertension Brother    • Cancer Brother    • Diabetes Brother        Social History     Socioeconomic History   • Marital status:    Tobacco Use   • Smoking status: Never   • Smokeless tobacco: Never   Substance and Sexual Activity   • Alcohol use: Never   • Drug use: Never   • Sexual activity: Defer       No Known Allergies    Review of Systems   Constitutional: Positive for fatigue. Negative for chills and fever.   HENT: Negative for congestion, rhinorrhea and sore throat.    Respiratory: Negative for cough, chest tightness  "and shortness of breath.    Cardiovascular: Negative for chest pain and palpitations.   Genitourinary: Negative for dysuria, frequency and urgency.        Vital Signs  Vitals:    01/27/23 1308   BP: 102/72   BP Location: Left arm   Patient Position: Sitting   Cuff Size: Large Adult   Pulse: 78   Temp: 98.2 °F (36.8 °C)   TempSrc: Temporal   SpO2: 98%   Weight: 88.5 kg (195 lb)   Height: 157.5 cm (62\")   PainSc: 0-No pain     Body mass index is 35.67 kg/m².  Class 2 Severe Obesity (BMI >=35 and <=39.9). Obesity-related health conditions include the following: none. Obesity is unchanged. BMI is is above average; BMI management plan is completed. We discussed low calorie, low carb based diet program, portion control and increasing exercise.     Advance Care Planning   ACP discussion was held with the patient during this visit. Patient does not have an advance directive, information provided.       Current Outpatient Medications:   •  albuterol sulfate  (90 Base) MCG/ACT inhaler, ProAir HFA 90 mcg/actuation aerosol inhaler  Every six hours, Disp: , Rfl:   •  escitalopram (LEXAPRO) 10 MG tablet, Take 1 tablet by mouth Every Morning., Disp: 90 tablet, Rfl: 1  •  Mavacamten (Camzyos) 2.5 MG capsule, , Disp: , Rfl:   •  metFORMIN (GLUCOPHAGE) 500 MG tablet, Take 1 tablet by mouth 2 (Two) Times a Day With Meals., Disp: 180 tablet, Rfl: 1  •  metoprolol succinate XL (TOPROL-XL) 25 MG 24 hr tablet, Take 1/2 tablet once daily. Do not crush or chew., Disp: , Rfl:   •  Synthroid 50 MCG tablet, Take 1 tablet by mouth Daily., Disp: 90 tablet, Rfl: 1    Physical Exam  Constitutional:       Appearance: Normal appearance.   HENT:      Right Ear: There is no impacted cerumen.      Left Ear: There is no impacted cerumen.      Nose: Nose normal. No congestion or rhinorrhea.      Mouth/Throat:      Mouth: Mucous membranes are moist.      Pharynx: No oropharyngeal exudate or posterior oropharyngeal erythema.   Eyes:      " Conjunctiva/sclera: Conjunctivae normal.      Pupils: Pupils are equal, round, and reactive to light.   Cardiovascular:      Rate and Rhythm: Normal rate and regular rhythm.      Pulses: Normal pulses.      Heart sounds: No murmur heard.    No friction rub. No gallop.   Pulmonary:      Breath sounds: Normal breath sounds. No stridor. No wheezing or rales.   Abdominal:      General: Bowel sounds are normal. There is no distension.      Tenderness: There is no abdominal tenderness.   Musculoskeletal:         General: No swelling.   Skin:     General: Skin is warm.      Findings: Lesion present.      Comments: Multiple flesh colored papules on face.   Neurological:      General: No focal deficit present.      Mental Status: She is alert and oriented to person, place, and time.   Psychiatric:         Mood and Affect: Mood normal.         Behavior: Behavior normal.          ACE III MINI            Results Review:    Recent Results (from the past 672 hour(s))   Comprehensive Metabolic Panel    Collection Time: 01/20/23  4:43 PM    Specimen: Blood   Result Value Ref Range    Glucose 114 (H) 65 - 99 mg/dL    BUN 15 8 - 23 mg/dL    Creatinine 0.93 0.57 - 1.00 mg/dL    Sodium 140 136 - 145 mmol/L    Potassium 4.4 3.5 - 5.2 mmol/L    Chloride 104 98 - 107 mmol/L    CO2 24.5 22.0 - 29.0 mmol/L    Calcium 9.9 8.6 - 10.5 mg/dL    Total Protein 7.3 6.0 - 8.5 g/dL    Albumin 4.4 3.5 - 5.2 g/dL    ALT (SGPT) 31 1 - 33 U/L    AST (SGOT) 45 (H) 1 - 32 U/L    Alkaline Phosphatase 65 39 - 117 U/L    Total Bilirubin 1.5 (H) 0.0 - 1.2 mg/dL    Globulin 2.9 gm/dL    A/G Ratio 1.5 g/dL    BUN/Creatinine Ratio 16.1 7.0 - 25.0    Anion Gap 11.5 5.0 - 15.0 mmol/L    eGFR 68.3 >60.0 mL/min/1.73   Lipid Panel    Collection Time: 01/20/23  4:43 PM    Specimen: Blood   Result Value Ref Range    Total Cholesterol 189 0 - 200 mg/dL    Triglycerides 165 (H) 0 - 150 mg/dL    HDL Cholesterol 49 40 - 60 mg/dL    LDL Cholesterol  111 (H) 0 - 100 mg/dL     VLDL Cholesterol 29 5 - 40 mg/dL    LDL/HDL Ratio 2.18    TSH    Collection Time: 01/20/23  4:43 PM    Specimen: Blood   Result Value Ref Range    TSH 2.280 0.270 - 4.200 uIU/mL   T4, Free    Collection Time: 01/20/23  4:43 PM    Specimen: Blood   Result Value Ref Range    Free T4 1.16 0.93 - 1.70 ng/dL   T3, Free    Collection Time: 01/20/23  4:43 PM    Specimen: Blood   Result Value Ref Range    T3, Free 2.85 2.00 - 4.40 pg/mL   Vitamin B12    Collection Time: 01/20/23  4:43 PM    Specimen: Blood   Result Value Ref Range    Vitamin B-12 706 211 - 946 pg/mL   Vitamin D 25 Hydroxy    Collection Time: 01/20/23  4:43 PM    Specimen: Blood   Result Value Ref Range    25 Hydroxy, Vitamin D 60.3 30.0 - 100.0 ng/ml   Hemoglobin A1c    Collection Time: 01/20/23  4:43 PM    Specimen: Blood   Result Value Ref Range    Hemoglobin A1C 6.30 (H) 4.80 - 5.60 %   CBC Auto Differential    Collection Time: 01/20/23  4:43 PM    Specimen: Blood   Result Value Ref Range    WBC 2.28 (L) 3.40 - 10.80 10*3/mm3    RBC 4.51 3.77 - 5.28 10*6/mm3    Hemoglobin 14.1 12.0 - 15.9 g/dL    Hematocrit 41.0 34.0 - 46.6 %    MCV 90.9 79.0 - 97.0 fL    MCH 31.3 26.6 - 33.0 pg    MCHC 34.4 31.5 - 35.7 g/dL    RDW 14.0 12.3 - 15.4 %    RDW-SD 46.1 37.0 - 54.0 fl    MPV 11.0 6.0 - 12.0 fL    Platelets 94 (L) 140 - 450 10*3/mm3    Neutrophil % 59.1 42.7 - 76.0 %    Lymphocyte % 28.1 19.6 - 45.3 %    Monocyte % 7.5 5.0 - 12.0 %    Eosinophil % 4.4 0.3 - 6.2 %    Basophil % 0.9 0.0 - 1.5 %    Neutrophils, Absolute 1.35 (L) 1.70 - 7.00 10*3/mm3    Lymphocytes, Absolute 0.64 (L) 0.70 - 3.10 10*3/mm3    Monocytes, Absolute 0.17 0.10 - 0.90 10*3/mm3    Eosinophils, Absolute 0.10 0.00 - 0.40 10*3/mm3    Basophils, Absolute 0.02 0.00 - 0.20 10*3/mm3     Procedures    Medication Review: Medications reviewed and noted    Social History     Socioeconomic History   • Marital status:    Tobacco Use   • Smoking status: Never   • Smokeless tobacco: Never    Substance and Sexual Activity   • Alcohol use: Never   • Drug use: Never   • Sexual activity: Defer        Assessment/Plan:    Diagnoses and all orders for this visit:    1. Congenital heart defect (Primary)  Overview:  Follows up with [facility] for monthly ECHO.       2. Acquired hypothyroidism  Overview:  Currently taking levothyroxine 50mg daily. Labs today.     Orders:  -     Synthroid 50 MCG tablet; Take 1 tablet by mouth Daily.  Dispense: 90 tablet; Refill: 1    3. Type 2 diabetes mellitus without complication, without long-term current use of insulin (HCC)  Overview:  Currently taking metformin 500mg daily.    Orders:  -     metFORMIN (GLUCOPHAGE) 500 MG tablet; Take 1 tablet by mouth 2 (Two) Times a Day With Meals.  Dispense: 180 tablet; Refill: 1    4. Fatigue, unspecified type    5. Anxiety  Overview:  Currently taking Lexapro 10mg daily which controls this well.    Orders:  -     escitalopram (LEXAPRO) 10 MG tablet; Take 1 tablet by mouth Every Morning.  Dispense: 90 tablet; Refill: 1    6. Papules  Overview:  Multiple flesh colored papules on face. Saw dermatology for this and they were found to be benign.     Orders:  -     Ambulatory Referral to Dermatology         Plan of care reviewed with patient at the conclusion of today's visit. Education was provided regarding diagnosis, management, and any prescribed or recommended OTC medications.Patient verbalizes understanding of and agreement with management plan.       I spent 25 minutes caring for Justine on this date of service. This time includes time spent by me in the following activities:preparing for the visit, reviewing tests, obtaining and/or reviewing a separately obtained history, performing a medically appropriate examination and/or evaluation , counseling and educating the patient/family/caregiver, ordering medications, tests, or procedures, referring and communicating with other health care professionals  and documenting  information in the medical record    Radha Cooney PA-C      Note: Part of this note may be an electronic transcription/translation of spoken language to printed text using the Dragon Dictation system.

## 2023-06-16 ENCOUNTER — TELEPHONE (OUTPATIENT)
Dept: INTERNAL MEDICINE | Facility: CLINIC | Age: 68
End: 2023-06-16
Payer: MEDICARE

## 2023-06-16 NOTE — TELEPHONE ENCOUNTER
PATIENT DROPPED PAPERWORK OFF ON 5/30/23 FOR THE PROVIDER TO FILL OUT. IT WAS TO CONFIRM SHE'S HEALTHY ENOUGH TO PROVIDE .    IT WAS SUPPOSED TO HAVE BEEN MAILED TO HER BUT SHE HAS NOT RECEIVED IT YET.    PLEASE FOLLOW UP.    PHONE: [phone]

## 2023-06-19 ENCOUNTER — TELEPHONE (OUTPATIENT)
Dept: INTERNAL MEDICINE | Facility: CLINIC | Age: 68
End: 2023-06-19

## 2023-06-19 NOTE — TELEPHONE ENCOUNTER
Patient verbalized understanding. She has a deadline for these forms so she has moved her AWV appt to this afternoon and she will bring a copy of the form she needs filled out. I advised her their may be a fee for the forms being completed.

## 2023-06-19 NOTE — TELEPHONE ENCOUNTER
DELETE AFTER REVIEWING: Telephone encounter to be sent to the clinical pool     Caller: Justine Fernandes    Relationship: Self    Best call back number: [phone]     What orders are you requesting (i.e. lab or imaging): LAB WORK    In what timeframe would the patient need to come in: ASAP    Where will you receive your lab/imaging services: [facility]    Additional notes: PATIENT IS COMING IN FOR A MEDICARE WELLNESS VISIT ON 6/27/23 AND WOULD LIKE TO GET BLOOD WORK DONE PRIOR. PLEASE CALL WHEN ORDERS ARE SENT.

## 2023-06-27 PROBLEM — I42.2 HYPERTROPHIC CARDIOMYOPATHY: Status: ACTIVE | Noted: 2023-06-27

## 2023-06-30 PROBLEM — N64.4 BREAST PAIN, LEFT: Status: ACTIVE | Noted: 2023-06-30

## 2023-06-30 PROBLEM — Z00.00 ROUTINE MEDICAL EXAM: Status: ACTIVE | Noted: 2023-06-30

## 2023-06-30 PROBLEM — Z78.0 POST-MENOPAUSAL: Status: ACTIVE | Noted: 2023-06-30

## 2023-08-03 DIAGNOSIS — R10.2 PELVIC PAIN: Primary | ICD-10-CM

## 2023-08-22 ENCOUNTER — APPOINTMENT (OUTPATIENT)
Dept: BONE DENSITY | Facility: HOSPITAL | Age: 68
End: 2023-08-22
Payer: MEDICARE

## 2023-08-22 ENCOUNTER — HOSPITAL ENCOUNTER (OUTPATIENT)
Dept: ULTRASOUND IMAGING | Facility: HOSPITAL | Age: 68
Discharge: HOME | End: 2023-08-22
Payer: MEDICARE

## 2023-08-22 ENCOUNTER — HOSPITAL ENCOUNTER (OUTPATIENT)
Dept: MAMMOGRAPHY | Facility: HOSPITAL | Age: 68
Discharge: HOME | End: 2023-08-22
Payer: MEDICARE

## 2023-08-22 DIAGNOSIS — N64.4 BREAST PAIN, LEFT: ICD-10-CM

## 2023-08-22 PROCEDURE — 76642 ULTRASOUND BREAST LIMITED: CPT

## 2023-08-22 PROCEDURE — 77066 DX MAMMO INCL CAD BI: CPT

## 2023-08-22 PROCEDURE — G0279 TOMOSYNTHESIS, MAMMO: HCPCS

## 2023-08-23 ENCOUNTER — HOSPITAL ENCOUNTER (OUTPATIENT)
Dept: ULTRASOUND IMAGING | Facility: HOSPITAL | Age: 68
Discharge: HOME | End: 2023-08-23
Payer: MEDICARE

## 2023-08-23 ENCOUNTER — HOSPITAL ENCOUNTER (OUTPATIENT)
Dept: MAMMOGRAPHY | Facility: HOSPITAL | Age: 68
Discharge: HOME | End: 2023-08-23
Payer: MEDICARE

## 2023-08-23 DIAGNOSIS — R92.8 ABNORMAL MAMMOGRAM: ICD-10-CM

## 2023-08-23 PROCEDURE — 0 LIDOCAINE 1 % SOLUTION: Performed by: RADIOLOGY

## 2023-08-23 PROCEDURE — A4648 IMPLANTABLE TISSUE MARKER: HCPCS

## 2023-08-23 RX ORDER — LIDOCAINE HYDROCHLORIDE AND EPINEPHRINE 10; 10 MG/ML; UG/ML
10 INJECTION, SOLUTION INFILTRATION; PERINEURAL ONCE
Status: COMPLETED | OUTPATIENT
Start: 2023-08-23 | End: 2023-08-23

## 2023-08-23 RX ORDER — LIDOCAINE HYDROCHLORIDE 10 MG/ML
5 INJECTION, SOLUTION INFILTRATION; PERINEURAL ONCE
Status: COMPLETED | OUTPATIENT
Start: 2023-08-23 | End: 2023-08-23

## 2023-08-23 RX ADMIN — Medication 5 ML: at 13:57

## 2023-08-23 RX ADMIN — LIDOCAINE HYDROCHLORIDE,EPINEPHRINE BITARTRATE 8 ML: 10; .01 INJECTION, SOLUTION INFILTRATION; PERINEURAL at 13:59

## 2023-08-24 ENCOUNTER — TELEPHONE (OUTPATIENT)
Dept: OBSTETRICS AND GYNECOLOGY | Facility: CLINIC | Age: 68
End: 2023-08-24
Payer: MEDICARE

## 2023-08-24 NOTE — TELEPHONE ENCOUNTER
Returned patient's call. States she does not need GYN care; only needs vaginal varicosities treated. Informed her that our office would not be able to do that. She may ask her PCP to refer her to Dr. Devon Hansen, vascular surgeon. She v/u.

## 2023-08-24 NOTE — TELEPHONE ENCOUNTER
Caller: Justine Fernandes    Relationship to patient: Self    Best call back number: [phone]    Chief complaint: VAGINAL VARICOSE VEINS     Type of visit: NEW GYN       Additional notes:PT WOULD LIKE TO KNOW IF WE CAN TREAT HER FOR VAGINAL VARICOSE VEINS  PLEASE CALL UNABLE TO WT CALL

## 2023-08-28 LAB
CYTO UR: NORMAL
LAB AP CASE REPORT: NORMAL
LAB AP CLINICAL INFORMATION: NORMAL
LAB AP DIAGNOSIS COMMENT: NORMAL
LAB AP SPECIAL STAINS: NORMAL
PATH REPORT.FINAL DX SPEC: NORMAL
PATH REPORT.GROSS SPEC: NORMAL

## 2023-08-29 ENCOUNTER — TELEPHONE (OUTPATIENT)
Dept: MAMMOGRAPHY | Facility: HOSPITAL | Age: 68
End: 2023-08-29
Payer: MEDICARE

## 2023-08-29 NOTE — TELEPHONE ENCOUNTER
Referring provider notified via Epic basket message  pathology returned as cancer and patient will be notified.     Patient notified of pathology results and recommendation. Verbalizes understanding. Denies discomfort.. Denies signs and symptoms of infection.     Patient desires Dr KARLI Carey for surgical consult. Patient will be notified of appointment. Patient verbalized understanding.    Reviewed what would be discussed at surgical consult visit, including detailed explanation of pathology report & imaging reports; treatment options & pros/cons, availability of breast nurse navigator. Patient encouraged to call back or contact breast nurse navigator with questions or concerns. Patient verbalized understanding. Breast cancer information packet offered and accepted. Patient information sent to genetics pool/breast nurse navigator for evaluation & possible referral for genetic counseling.

## 2023-08-29 NOTE — TELEPHONE ENCOUNTER
Patient notified of surgical consult appointment with Dr. KARLI Carey on 9/13/23 @ 5632. Patient verbalized understanding.    Patient given office contact & location information. Told to bring photo ID, list of prescription & OTC medications and insurance information. May be accompanied by family member or friend for support.

## 2023-08-30 ENCOUNTER — HOSPITAL ENCOUNTER (OUTPATIENT)
Dept: BONE DENSITY | Facility: HOSPITAL | Age: 68
Discharge: HOME | End: 2023-08-30
Admitting: PHYSICIAN ASSISTANT
Payer: MEDICARE

## 2023-08-30 DIAGNOSIS — Z78.0 POST-MENOPAUSAL: ICD-10-CM

## 2023-08-30 PROCEDURE — 77080 DXA BONE DENSITY AXIAL: CPT

## 2023-09-12 ENCOUNTER — TRANSCRIBE ORDERS (OUTPATIENT)
Dept: PHYSICAL THERAPY | Facility: HOSPITAL | Age: 68
End: 2023-09-12
Payer: MEDICARE

## 2023-09-12 DIAGNOSIS — C50.912 MALIGNANT NEOPLASM OF LEFT FEMALE BREAST, UNSPECIFIED ESTROGEN RECEPTOR STATUS, UNSPECIFIED SITE OF BREAST: Primary | ICD-10-CM

## 2023-09-13 ENCOUNTER — HOSPITAL ENCOUNTER (OUTPATIENT)
Dept: PHYSICAL THERAPY | Facility: HOSPITAL | Age: 68
Setting detail: THERAPIES SERIES
Discharge: HOME | End: 2023-09-13
Payer: MEDICARE

## 2023-09-13 ENCOUNTER — LAB (OUTPATIENT)
Dept: LAB | Facility: HOSPITAL | Age: 68
End: 2023-09-13
Payer: MEDICARE

## 2023-09-13 ENCOUNTER — PATIENT OUTREACH (OUTPATIENT)
Dept: ONCOLOGY | Facility: CLINIC | Age: 68
End: 2023-09-13
Payer: MEDICARE

## 2023-09-13 VITALS — BODY MASS INDEX: 30.05 KG/M2 | HEIGHT: 66 IN | WEIGHT: 187 LBS

## 2023-09-13 DIAGNOSIS — C50.912 MALIGNANT NEOPLASM OF LEFT FEMALE BREAST, UNSPECIFIED ESTROGEN RECEPTOR STATUS, UNSPECIFIED SITE OF BREAST: Primary | ICD-10-CM

## 2023-09-13 DIAGNOSIS — C50.911 MALIGNANT NEOPLASM OF RIGHT BREAST IN FEMALE, ESTROGEN RECEPTOR POSITIVE, UNSPECIFIED SITE OF BREAST: Primary | ICD-10-CM

## 2023-09-13 DIAGNOSIS — Z17.0 MALIGNANT NEOPLASM OF RIGHT BREAST IN FEMALE, ESTROGEN RECEPTOR POSITIVE, UNSPECIFIED SITE OF BREAST: Primary | ICD-10-CM

## 2023-09-13 DIAGNOSIS — Z13.79 GENETIC TESTING: Primary | ICD-10-CM

## 2023-09-13 DIAGNOSIS — C50.912 MALIGNANT NEOPLASM OF LEFT BREAST IN FEMALE, ESTROGEN RECEPTOR POSITIVE, UNSPECIFIED SITE OF BREAST: Primary | ICD-10-CM

## 2023-09-13 DIAGNOSIS — Z17.0 MALIGNANT NEOPLASM OF LEFT BREAST IN FEMALE, ESTROGEN RECEPTOR POSITIVE, UNSPECIFIED SITE OF BREAST: Primary | ICD-10-CM

## 2023-09-13 PROCEDURE — 97162 PT EVAL MOD COMPLEX 30 MIN: CPT

## 2023-09-13 PROCEDURE — 93702 BIS XTRACELL FLUID ANALYSIS: CPT

## 2023-09-13 NOTE — THERAPY DISCHARGE NOTE
Outpatient Physical Therapy Lymphedema Initial Evaluation & Discharge  [facility]     Patient Name: Justine Fernandes  : 1955  MRN: [id number]  Today's Date: 2023      Visit Date: 2023    Visit Dx:    ICD-10-CM ICD-9-CM   1. Malignant neoplasm of left female breast, unspecified estrogen receptor status, unspecified site of breast  C50.912 174.9       Patient Active Problem List   Diagnosis    Anxiety    Type 2 diabetes mellitus without complication, without long-term current use of insulin    Congenital heart defect    Sleep apnea    Leukopenia    Hypothyroidism    Fatigue    Other cirrhosis of liver    Platelets decreased    Welcome to Medicare preventive visit    Papules    Hypertrophic cardiomyopathy    Routine medical exam    Breast pain, left    Post-menopausal        Past Medical History:   Diagnosis Date    Anxiety     Asthma     Mild     Bundle branch block     Circulation disorder of lower extremity     legs    Cirrhosis, nonalcoholic     Diabetes mellitus     Hypothyroid     Sleep apnea         Past Surgical History:   Procedure Laterality Date    BLADDER SURGERY  1973    BREAST CYST EXCISION Left 2000    excisional biopsy    CARPAL TUNNEL RELEASE  2018    right     KIDNEY STONE SURGERY  1994    KNEE SURGERY  2019    meniscus repair- left knee    NASAL POLYP SURGERY  2006    NASAL SEPTUM SURGERY  1999    REPLACEMENT TOTAL HIP LATERAL POSITION  10/02/2020    TONSILLECTOMY AND ADENOIDECTOMY      TUBAL ABDOMINAL LIGATION      VARICOSE VEIN SURGERY  2006    Right leg             Lymphedema       Row Name 23 1030             Subjective Pain    Able to rate subjective pain? yes  -KG      Pre-Treatment Pain Level 0  -KG      Post-Treatment Pain Level 0  -KG         Subjective    Subjective Comments Pt is 67 yo female with new L sided BrCA and planning for lumpectomy with SLNB.  Pt reports she has no UE limitations currently and feels prepared for treatment.  -KG         Lymphedema  Assessment    Lymphedema Assessment Comments LUE at risk  -KG         Posture/Observations    Posture/Observations Comments WNL  -KG         General ROM    GENERAL ROM COMMENTS WNL  -KG         MMT (Manual Muscle Testing)    General MMT Comments WNL  -KG         Lymphedema Edema Assessment    Edema Assessment Comment no current edema  -KG         Skin Changes/Observations    Skin Observations Comment normal  -KG         Lymphedema Sensation    Lymphedema Sensation Comments normal  -KG         L-Dex Bioimpedence Screening    L-Dex Measurement Extremity LUE  -KG      L-Dex Patient Position Standing  -KG      L-Dex UE Dominate Side Right  -KG      L-Dex UE At Risk Side Left  -KG      L-Dex UE Baseline Score 3.8  -KG      L-Dex UE Comment The patient had SOZO measurement which I reviewed today. The score is in normal limits, see scanned to EMR. Bioimpedance spectroscopy helps identify the onset of lymphedema in an arm or leg before patients experience noticeable swelling. Research has shown that the early detection of lymphedema using L-Dex combined with treatment can reduce progression to chronic lymphedema by 95% in breast cancer patients. Whenever possible, patients are tested for baseline L-Dex score before cancer treatment begins and then are reassessed during regular follow-up visits using the SOZO device. Otherwise, this can be started postoperatively and continued during regular follow-up visits. If the patient’s L-Dex score increases above normal levels, that is a sign that lymphedema is developing and a referral is made to occupational or physical therapy for further evaluation and early compression treatment. Lymphedema assessment with the SOZO L-Dex score is recommended to be done every 3 months for the first 3 years and then every 6 months for years 4 and 5 followed by annually afterwards.  -KG      Skeletal Muscle Mass (%) 21.5 %  -KG      Fat Mass (%) 41.9 %  -KG      Hy-dex -3.9  -KG      Height 167 cm  "(65.75\")  -KG      Weight 84.8 kg (187 lb)  -KG      BMI (Calculated) 30.4  -KG                User Key  (r) = Recorded By, (t) = Taken By, (c) = Cosigned By      Initials Name Provider Type    Julissa Harding Physical Therapist                                       Therapy Education  Education Details: Pt edu on prehab evaluation assessments including bioimpedance.  Pt was provided w/ [facility]AB[facility] materials where she will attend free education class to learn more about lymphedema, exercises, etc  Given: Symptoms/condition management, Posture/body mechanics  Program: New  How Provided: Demonstration  Provided to: Patient  Level of Understanding: Verbalized     OP Exercises       Row Name 09/13/23 1030             Subjective    Subjective Comments Pt is 69 yo female with new L sided BrCA and planning for lumpectomy with SLNB.  Pt reports she has no UE limitations currently and feels prepared for treatment.  -KG         Subjective Pain    Able to rate subjective pain? yes  -KG      Pre-Treatment Pain Level 0  -KG      Post-Treatment Pain Level 0  -KG                User Key  (r) = Recorded By, (t) = Taken By, (c) = Cosigned By      Initials Name Provider Type    Julissa Harding Physical Therapist                                   PT OP Goals       Row Name 09/13/23 1100          PT Short Term Goals    STG Date to Achieve 10/13/23  -KG     STG 1 Goal 1: Pt demonstrates awareness of post-operative movement restrictions and HEP to facilitate lymphatic regeneration and reduce the risk of seroma formation, axillary web syndrome, and lymphedema while ensuring shoulder joint mobility.  -KG     STG 1 Progress Met  -KG     STG 1 Progress Comments goal 2: pt demonstrates understeanding of post-operative basic lymphedema precautions  -KG     STG 2 met  -KG               User Key  (r) = Recorded By, (t) = Taken By, (c) = Cosigned By      Initials Name Provider Type    Julissa Harding Physical Therapist                     " PT Assessment/Plan       Row Name 09/13/23 1100          PT Assessment    Assessment Comments Ms Fernandes presents to PT pre-operatively for planned BrCA surgery scheduled in a couple weeks.  baseline ROM, postureal and bioimpedance measurements were taken today to be compared to measurements retaken  3-4 weeks post surgery.  At that time, any reduced movement, decline in function or postural issues will be addressed wtih skilled care and new goals will be estabilished.  Personal risk factors for lymphedema post-operatively for the L upper extremity and trunk were also assessed and lymphedema precautions were dischussed.  A more detailed discussion regarding personal lymphedema risk factors will take place post-operatively once the number of lymp nodes removed and plan for further medical care is known  -KG     Please refer to paper survey for additional self-reported information Yes  -KG     Rehab Potential Good  -KG     Patient/caregiver participated in establishment of treatment plan and goals Yes  -KG     Patient would benefit from skilled therapy intervention Yes  -KG        PT Plan    Planned CPT's? PT EVAL MOD COMPLELITY: 49862;PT BIS XTRACELL FLUID ANALYSIS: 43220  -KG     Physical Therapy Interventions (Optional Details) patient/family education;postural re-education;ROM (Range of Motion)  -KG     PT Plan Comments Ms. Fernandes may return to PT 3-4 weeks post operatively for re-evaluation measurements to be compared to measurements taken today, at her pre-operative evaluation. In addition, she will be examined for possible post-BrCA surgery sequelae such as axillary web syndrome, scar adhesions, edema, worsened posture, scapular winging, pain, and reduced ROM and function. At that time, a future plan and goals will be established and skilled care continued if indicated. Currently, Ms. Fernandes has been provided with additional information for healing after BrCA surgery in order to facilitate recovery and reduce  the risk of post-operative sequelae  -KG               User Key  (r) = Recorded By, (t) = Taken By, (c) = Cosigned By      Initials Name Provider Type    Julissa Harding Physical Therapist                     Outcome Measure Options: Quick DASH  Quick DASH  Open a tight or new jar.: Mild Difficulty  Do heavy household chores (e.g., wash walls, wash floors): No Difficulty  Carry a shopping bag or briefcase: No Difficulty  Wash your back: No Difficulty  Use a knife to cut food: No Difficulty  Recreational activities in which you take some force or impact through your arm, should or hand (e.g. golf, hammering, tennis, etc.): No Difficulty  During the past week, to what extent has your arm, shoulder, or hand problem interfered with your normal social activites with family, friends, neighbors or groups?: Not at all  During the past week, were you limited in your work or other regular daily activities as a result of your arm, shoulder or hand problem?: Not limited at all  Arm, Shoulder, or hand pain: None  Tingling (pins and needles) in your arm, shoulder, or hand: None  During the past week, how much difficulty have you had sleeping because of the pain in your arm, shoulder or hand?: No difficulty  Number of Questions Answered: 11  Quick DASH Score: 2.27         Time Calculation:   Start Time: 1030     Therapy Charges for Today       Code Description Service Date Service Provider Modifiers Qty    27557479676 HC PT BIS XTRACELL FLUID ANALYSIS 9/13/2023 Julissa Church  1    73268430056 HC PT EVAL MOD COMPLEXITY 4 9/13/2023 Julsisa Church GP 1            PT G-Codes  Outcome Measure Options: Quick DASH  Quick DASH Score: 2.27            Julissa Church  9/13/2023

## 2023-09-13 NOTE — SIGNIFICANT NOTE
I saw patient with Dr NEIL - he reviewed the pathology report and surgical/treatment options. MRI has been ordered - Genetic testing for surgical decision has been ordered and patient had blood draw today - she will get surgical decision from her cardiologist prior to surgery - she prefers to have surgery Tuesday 10/10/2023. Dr. NEIL is aware of this. She saw PT today for SOZO -patient will make surgical decision based on genetic and MRI results - educational and supportive materials were given and reviewed.

## 2023-09-15 ENCOUNTER — CLINICAL SUPPORT (OUTPATIENT)
Dept: GENETICS | Facility: HOSPITAL | Age: 68
End: 2023-09-15
Payer: MEDICARE

## 2023-09-15 DIAGNOSIS — Z80.0 FAMILY HISTORY OF MALIGNANT NEOPLASM OF GASTROINTESTINAL TRACT: ICD-10-CM

## 2023-09-15 DIAGNOSIS — Z80.42 FAMILY HISTORY OF MALIGNANT NEOPLASM OF PROSTATE: ICD-10-CM

## 2023-09-15 DIAGNOSIS — Z80.3 FAMILY HISTORY OF MALIGNANT NEOPLASM OF BREAST: ICD-10-CM

## 2023-09-15 DIAGNOSIS — C50.912 MALIGNANT NEOPLASM OF LEFT BREAST IN FEMALE, ESTROGEN RECEPTOR POSITIVE, UNSPECIFIED SITE OF BREAST: ICD-10-CM

## 2023-09-15 DIAGNOSIS — Z17.0 MALIGNANT NEOPLASM OF LEFT BREAST IN FEMALE, ESTROGEN RECEPTOR POSITIVE, UNSPECIFIED SITE OF BREAST: ICD-10-CM

## 2023-09-15 DIAGNOSIS — Z13.79 GENETIC TESTING: Primary | ICD-10-CM

## 2023-09-15 NOTE — PROGRESS NOTES
Genetic counseling was provided via telehealth.  Patient's name and date of birth was confirmed and confirmed that patient was physically located in [place] at the time of the appointment.      Justine Fernandes, a 68-year-old female, was referred for genetic counseling due to a personal history of breast cancer. Genetic counseling was provided via telehealth. Ms. Fernandes was recently diagnosed with a left breast cancer at age 68 and is in the process of making a surgical decision. Ms. Fernandes retains her uterus and ovaries. She has colonoscopies every five years and reports having less than ten polyps removed over her lifetime. She was interested in discussing her risk for a hereditary cancer syndrome.  Ms. Fernandes was interested in pursuing a multi-gene panel to evaluate her risk, therefore the CancerNext panel was ordered through [facility] which analyzes BRCA1/2 and 34 additional genes associated with an increased cancer risk.     PERTINENT FAMILY HISTORY: (See attached pedigree)   Mat. Half-Sister: Lobular breast cancer, 66  Mat. Half-Brother: Prostate cancer, 66; Rectal cancer, 76  Mat. Half-Sister: Melanoma, 60s  Mat. Half-Brother: Melanoma, 70  Mother:  Breast cancer, 80; Colon cancer, 80  Mat. Uncle:  Male breast cancer  Mat. Aunt:  Breast cancer  Mat. 1st cousin: Breast cancer, 42  Mat. 1st cousin: Breast cancer, 50    Limited paternal family history. Records regarding the family history were not provided for review.     RISK ASSESSMENT:  Ms. Fernandes's personal and family history of breast cancer led to concern for a hereditary cancer syndrome. We discussed BRCA1/2 testing as well as the option of pursuing a panel that would test for other genes known to impact cancer risk in addition to BRCA1/2.   Ms. Fernandes clearly meets NCCN guidelines criteria for BRCA1/2 testing based on her personal and family history of breast cancer. These risk assessments are based on the family history information provided at the  time of the appointment, and could change in the future should new information be obtained.    GENETIC COUNSELING: We reviewed the family history information in detail. Cases of cancer follow three general patterns: sporadic, familial, and hereditary.  While most cancer is sporadic, some cases appear to occur in family clusters.  These cases are said to be familial and account for 10-20% of cancer cases.  Familial cases may be due to a combination of shared genes and environmental factors among family members.  In even fewer families (5-10%), the cancer is said to be inherited, and the genes responsible for the cancer are known.      Family histories typical of hereditary cancer syndromes usually include multiple first- and second-degree relatives diagnosed with cancer types that define a syndrome.  These cases tend to be diagnosed at younger-than-expected ages and can be bilateral or multifocal.  The cancer in these families follows an autosomal dominant inheritance pattern, which indicates the likely presence of a mutation in a cancer susceptibility gene.  Children and siblings of an individual believed to carry this mutation have a 50% chance of inheriting that mutation, thereby inheriting the increased risk to develop cancer.  These mutations can be passed down from the maternal or the paternal lineage.    Hereditary breast cancer accounts for 5-10% of all cases of breast cancer.  A significant proportion of hereditary breast and ovarian cancer can be attributed to mutations in the BRCA1 and BRCA2 genes.  Mutations in these genes confer an increased risk for breast cancer, ovarian cancer, male breast cancer, prostate cancer, and pancreatic cancer. Women with a BRCA1 or BRCA2 mutation who have already been diagnosed with breast cancer have a 40-60% lifetime risk of a second breast cancer. Women with a BRCA1 or BRCA2 mutation have up to a 44% risk of ovarian cancer.      There are other genes that are known to be  associated with an increased risk for cancer.  Some of these genes have well defined cancer risks and established management guidelines.  Other genes that can be tested for have been more recently described, and there may be less data regarding the risks and therefore may not have established management guidelines. We discussed these limitations at length.  Based on Ms. Fernandes's desire to get as much information as possible regarding her personal risks and potential risks for her family, she opted to pursue testing through a panel that would look at several other genes known to increase the risk for cancer.    GENETIC TESTING:  The risks, benefits and limitations of genetic testing and implications for clinical management following testing were reviewed.  DNA test results can influence decisions regarding screening, prevention and surgical management.  Genetic testing can have significant psychological implications for both individuals and families.  Also discussed was the possibility of employment and insurance discrimination based on genetic test results and the laws in place to prevent this (TRAY).    We discussed panel testing, which would involve testing for BRCA1/2 as well as 34 additional genes that are associated with increased cancer risk. The benefits and limitations of genetic testing were discussed and Ms. eFrnandes decided to pursue testing via the panel. The implications of a positive or negative test result were discussed. We discussed the possibility that, in some cases, genetic test results may be informative or may be ambiguous due to the identification of a genetic variant. These variants may or may not be associated with an increased cancer risk.  With multigene panel testing, it is not uncommon for a variant of uncertain significance (VUS) to be identified.  If a VUS is identified, testing family members is typically not recommended and screening recommendations are made based on the family  history.  The laboratories that perform genetic testing work to reclassify the VUS and send out an amended report if and when a VUS is reclassified.  The majority of variant findings are ultimately reclassified to a negative result.  Given her personal and family history, a negative test result would not eliminate all cancer risk to her relatives, although the risk would not be as high as it would with positive genetic testing.      PLAN: Genetic testing via the CancerNext panel through [facility] was ordered. Her blood was drawn 9/13/2023 for testing.  Results from the high/moderate risk breast cancer genes are expected in 7-10 business days, and results from the remainder of the panel are expected in 2-3 weeks.  Ms. Fernandes is welcome to contact us in the meantime with any questions she may have at [phone].       Justine Turner MS, [facility], Located within [facility]  Licensed Certified Genetic Counselor

## 2023-09-18 ENCOUNTER — HOSPITAL ENCOUNTER (OUTPATIENT)
Dept: MRI IMAGING | Facility: HOSPITAL | Age: 68
Discharge: HOME | End: 2023-09-18
Admitting: SURGERY
Payer: MEDICARE

## 2023-09-18 DIAGNOSIS — C50.412 MALIGNANT NEOPLASM OF UPPER-OUTER QUADRANT OF LEFT FEMALE BREAST, UNSPECIFIED ESTROGEN RECEPTOR STATUS: ICD-10-CM

## 2023-09-18 LAB — CREAT BLDA-MCNC: 0.7 MG/DL (ref 0.6–1.3)

## 2023-09-18 PROCEDURE — C8908 MRI W/O FOL W/CONT, BREAST,: HCPCS

## 2023-09-18 PROCEDURE — 0 GADOBENATE DIMEGLUMINE 529 MG/ML SOLUTION: Performed by: SURGERY

## 2023-09-18 PROCEDURE — C8937 CAD BREAST MRI: HCPCS

## 2023-09-18 PROCEDURE — A9577 INJ MULTIHANCE: HCPCS | Performed by: SURGERY

## 2023-09-18 PROCEDURE — 82565 ASSAY OF CREATININE: CPT

## 2023-09-18 PROCEDURE — 77049 MRI BREAST C-+ W/CAD BI: CPT | Performed by: RADIOLOGY

## 2023-09-18 RX ADMIN — GADOBENATE DIMEGLUMINE 18 ML: 529 INJECTION, SOLUTION INTRAVENOUS at 08:58

## 2023-09-19 ENCOUNTER — TELEPHONE (OUTPATIENT)
Dept: MRI IMAGING | Facility: HOSPITAL | Age: 68
End: 2023-09-19
Payer: MEDICARE

## 2023-09-26 ENCOUNTER — TELEPHONE (OUTPATIENT)
Dept: GENETICS | Facility: HOSPITAL | Age: 68
End: 2023-09-26
Payer: MEDICARE

## 2023-09-26 ENCOUNTER — DOCUMENTATION (OUTPATIENT)
Dept: GENETICS | Facility: HOSPITAL | Age: 68
End: 2023-09-26
Payer: MEDICARE

## 2023-09-26 NOTE — TELEPHONE ENCOUNTER
Spoke with patient and disclosed negative genetic results. Informed patient these results would be on [facility] and sent to her Dr. Patient requested a copy be mailed to her.

## 2023-09-26 NOTE — PROGRESS NOTES
Justine Fernandes, a 68-year-old female, was referred for genetic counseling due to a personal history of breast cancer. Genetic counseling was provided via telehealth. Ms. Fernandes was recently diagnosed with a left breast cancer at age 68 and is in the process of making a surgical decision. Ms. Fernandes retains her uterus and ovaries. She has colonoscopies every five years and reports having less than ten polyps removed over her lifetime. She was interested in discussing her risk for a hereditary cancer syndrome.  Ms. Fernandes was interested in pursuing a multi-gene panel to evaluate her risk, therefore the CancerNext panel was ordered through [employer] which analyzes BRCA1/2 and 34 additional genes associated with an increased cancer risk.  The genes on this panel include APC, CARLO, AXIN2, BARD1, BMPR1A, BRCA1, BRCA2, BRIP1, CDH1, CDK4, CDKN2A, CHEK2, DICER1, EPCAM, GREM1, HOXB13, MLH1, MSH2, MSH3, MSH6, MUTYH, NBN, NF1, NTHL1, PALB2, PMS2, POLD1, POLE, PTEN, RAD51C, RAD51D, RECQL, SMAD4, SMARCA4, STK11, and TP53. Genetic testing was negative for pathogenic mutations in BRCA1/2 and 34 additional genes on the CancerNext panel.  These normal results were discussed with Ms. Fernandes by telephone on 9/26/2023.    PERTINENT FAMILY HISTORY: (See attached pedigree)   Mat. Half-Sister: Lobular breast cancer, 66  Mat. Half-Brother: Prostate cancer, 66; Rectal cancer, 76  Mat. Half-Sister: Melanoma, 60s  Mat. Half-Brother: Melanoma, 70  Mother:  Breast cancer, 80; Colon cancer, 80  Mat. Uncle:  Male breast cancer  Mat. Aunt:  Breast cancer  Mat. 1st cousin: Breast cancer, 42  Mat. 1st cousin: Breast cancer, 50    Limited paternal family history. Records regarding the family history were not provided for review.     RISK ASSESSMENT:  Ms. Fernandes's personal and family history of breast cancer led to concern for a hereditary cancer syndrome. We discussed BRCA1/2 testing as well as the option of pursuing a panel that would test for  other genes known to impact cancer risk in addition to BRCA1/2.   Ms. Fernandes clearly meets NCCN guidelines criteria for BRCA1/2 testing based on her personal and family history of breast cancer. These risk assessments are based on the family history information provided at the time of the appointment, and could change in the future should new information be obtained.    GENETIC COUNSELING: We reviewed the family history information in detail. Cases of cancer follow three general patterns: sporadic, familial, and hereditary.  While most cancer is sporadic, some cases appear to occur in family clusters.  These cases are said to be familial and account for 10-20% of cancer cases.  Familial cases may be due to a combination of shared genes and environmental factors among family members.  In even fewer families (5-10%), the cancer is said to be inherited, and the genes responsible for the cancer are known.      Family histories typical of hereditary cancer syndromes usually include multiple first- and second-degree relatives diagnosed with cancer types that define a syndrome.  These cases tend to be diagnosed at younger-than-expected ages and can be bilateral or multifocal.  The cancer in these families follows an autosomal dominant inheritance pattern, which indicates the likely presence of a mutation in a cancer susceptibility gene.  Children and siblings of an individual believed to carry this mutation have a 50% chance of inheriting that mutation, thereby inheriting the increased risk to develop cancer.  These mutations can be passed down from the maternal or the paternal lineage.    Hereditary breast cancer accounts for 5-10% of all cases of breast cancer.  A significant proportion of hereditary breast and ovarian cancer can be attributed to mutations in the BRCA1 and BRCA2 genes.  Mutations in these genes confer an increased risk for breast cancer, ovarian cancer, male breast cancer, prostate cancer, and pancreatic  cancer. Women with a BRCA1 or BRCA2 mutation who have already been diagnosed with breast cancer have a 40-60% lifetime risk of a second breast cancer. Women with a BRCA1 or BRCA2 mutation have up to a 44% risk of ovarian cancer.      There are other genes that are known to be associated with an increased risk for cancer.  Some of these genes have well defined cancer risks and established management guidelines.  Other genes that can be tested for have been more recently described, and there may be less data regarding the risks and therefore may not have established management guidelines. We discussed these limitations at length.  Based on Ms. Fernandes's desire to get as much information as possible regarding her personal risks and potential risks for her family, she opted to pursue testing through a panel that would look at several other genes known to increase the risk for cancer.    GENETIC TESTING:  The risks, benefits and limitations of genetic testing and implications for clinical management following testing were reviewed.  DNA test results can influence decisions regarding screening, prevention and surgical management.  Genetic testing can have significant psychological implications for both individuals and families.  Also discussed was the possibility of employment and insurance discrimination based on genetic test results and the laws in place to prevent this (TRAY).    We discussed panel testing, which would involve testing for BRCA1/2 as well as 34 additional genes that are associated with increased cancer risk. The benefits and limitations of genetic testing were discussed and Ms. Fernandes decided to pursue testing via the panel. The implications of a positive or negative test result were discussed. We discussed the possibility that, in some cases, genetic test results may be informative or may be ambiguous due to the identification of a genetic variant. These variants may or may not be associated with an  increased cancer risk.  With multigene panel testing, it is not uncommon for a variant of uncertain significance (VUS) to be identified.  If a VUS is identified, testing family members is typically not recommended and screening recommendations are made based on the family history.  The laboratories that perform genetic testing work to reclassify the VUS and send out an amended report if and when a VUS is reclassified.  The majority of variant findings are ultimately reclassified to a negative result.  Given her personal and family history, a negative test result would not eliminate all cancer risk to her relatives, although the risk would not be as high as it would with positive genetic testing.      TEST RESULTS:  Genetic testing was negative for known pathogenic mutations by sequencing, rearrangement testing, and RNA analysis for the genes on the CancerNext panel (see attached results). This negative result greatly lowers but does not eliminate the risk of a hereditary cancer syndrome for Ms. Fernandes. This assessment is based on the information provided at time of consultation.    CANCER SCREENING: Ms. Fernandes's surveillance and management should be determined by her oncology team. Despite the negative genetic test results, Ms. Fernandes's female relatives may have a somewhat increased lifetime risk for breast cancer based on family history. Female relatives could have a risk assessment performed using a family history-based model, such as the Tyrer-Cuzick model, to determine their individual risks.  Surveillance for individuals with a high lifetime risk of breast cancer (>20%, versus the average risk of 12%), based on NCCN guidelines, would consist of semi-annual clinical breast exams and monthly self-breast exams starting by age 18 and annual mammography starting 10 years younger than the earliest diagnosis in a close relative, or starting by age 40, whichever is earliest.  According to an American Cancer Society  expert panel, annual breast MRI should be offered to women whose lifetime risk of breast cancer is 20-25 percent or more, also starting by age 40 or earlier if indicated by family history.      PLAN: Genetic counseling remains available to Ms. Dom. She is welcome to contact us with any questions or concerns at [phone].      Justine Turner MS, [facility], [facility]  Licensed Certified Genetic Counselor       Cc: Justine Carey MD

## 2023-09-28 ENCOUNTER — TRANSCRIBE ORDERS (OUTPATIENT)
Dept: ADMINISTRATIVE | Facility: HOSPITAL | Age: 68
End: 2023-09-28
Payer: MEDICARE

## 2023-09-28 DIAGNOSIS — C50.412 MALIGNANT NEOPLASM OF UPPER-OUTER QUADRANT OF LEFT FEMALE BREAST, UNSPECIFIED ESTROGEN RECEPTOR STATUS: Primary | ICD-10-CM

## 2023-09-29 ENCOUNTER — TRANSCRIBE ORDERS (OUTPATIENT)
Dept: ADMINISTRATIVE | Facility: HOSPITAL | Age: 68
End: 2023-09-29
Payer: MEDICARE

## 2023-10-02 ENCOUNTER — PATIENT OUTREACH (OUTPATIENT)
Dept: ONCOLOGY | Facility: CLINIC | Age: 68
End: 2023-10-02
Payer: MEDICARE

## 2023-10-02 NOTE — SIGNIFICANT NOTE
Patient called with questions regarding PAT and EKG - she states that she recently had ECHO at  - I explained that the ECHO and EKG were 2 different test and that the nurses in PAT will be able to discuss this with her - she verbalized understanding-

## 2023-10-05 ENCOUNTER — PRE-ADMISSION TESTING (OUTPATIENT)
Dept: PREADMISSION TESTING | Facility: HOSPITAL | Age: 68
End: 2023-10-05
Payer: MEDICARE

## 2023-10-05 VITALS — BODY MASS INDEX: 31.35 KG/M2 | WEIGHT: 188.16 LBS | HEIGHT: 65 IN

## 2023-10-05 LAB
ALBUMIN SERPL-MCNC: 4.1 G/DL (ref 3.5–5.2)
ALBUMIN/GLOB SERPL: 1.1 G/DL
ALP SERPL-CCNC: 71 U/L (ref 39–117)
ALT SERPL W P-5'-P-CCNC: 20 U/L (ref 1–33)
ANION GAP SERPL CALCULATED.3IONS-SCNC: 9 MMOL/L (ref 5–15)
AST SERPL-CCNC: 31 U/L (ref 1–32)
BILIRUB SERPL-MCNC: 1.2 MG/DL (ref 0–1.2)
BUN SERPL-MCNC: 10 MG/DL (ref 8–23)
BUN/CREAT SERPL: 14.9 (ref 7–25)
CALCIUM SPEC-SCNC: 9.7 MG/DL (ref 8.6–10.5)
CHLORIDE SERPL-SCNC: 102 MMOL/L (ref 98–107)
CO2 SERPL-SCNC: 26 MMOL/L (ref 22–29)
CREAT SERPL-MCNC: 0.67 MG/DL (ref 0.57–1)
DEPRECATED RDW RBC AUTO: 46.8 FL (ref 37–54)
EGFRCR SERPLBLD CKD-EPI 2021: 96.5 ML/MIN/1.73
ERYTHROCYTE [DISTWIDTH] IN BLOOD BY AUTOMATED COUNT: 13.8 % (ref 12.3–15.4)
GLOBULIN UR ELPH-MCNC: 3.7 GM/DL
GLUCOSE SERPL-MCNC: 89 MG/DL (ref 65–99)
HCT VFR BLD AUTO: 40.3 % (ref 34–46.6)
HGB BLD-MCNC: 14 G/DL (ref 12–15.9)
MCH RBC QN AUTO: 31.9 PG (ref 26.6–33)
MCHC RBC AUTO-ENTMCNC: 34.7 G/DL (ref 31.5–35.7)
MCV RBC AUTO: 91.8 FL (ref 79–97)
PLATELET # BLD AUTO: 103 10*3/MM3 (ref 140–450)
PMV BLD AUTO: 9.5 FL (ref 6–12)
POTASSIUM SERPL-SCNC: 4.1 MMOL/L (ref 3.5–5.2)
PROT SERPL-MCNC: 7.8 G/DL (ref 6–8.5)
RBC # BLD AUTO: 4.39 10*6/MM3 (ref 3.77–5.28)
SODIUM SERPL-SCNC: 137 MMOL/L (ref 136–145)
WBC NRBC COR # BLD: 2.91 10*3/MM3 (ref 3.4–10.8)

## 2023-10-05 PROCEDURE — 36415 COLL VENOUS BLD VENIPUNCTURE: CPT

## 2023-10-05 PROCEDURE — 80053 COMPREHEN METABOLIC PANEL: CPT

## 2023-10-05 PROCEDURE — 85027 COMPLETE CBC AUTOMATED: CPT

## 2023-10-05 NOTE — PAT
An arrival time for procedure was not provided during PAT visit. If patient had any questions or concerns about their arrival time, they were instructed to contact their surgeon/physician.  Additionally, if the patient referred to an arrival time that was acquired from their my chart account, patient was encouraged to verify that time with their surgeon/physician. Arrival times are NOT provided in Pre Admission Testing Department.    Patient viewed general PAT education video as instructed in their preoperative information received from their surgeon.  Patient stated the general PAT education video was viewed in its entirety and survey completed.  Copies of PAT general education handouts (Incentive Spirometry, Meds to Beds Program, Patient Belongings, Pre-op skin preparation instructions, Blood Glucose testing, Visitor policy, Surgery FAQ, Code H) distributed to patient if not printed. Education related to the PAT pass and skin preparation for surgery (if applicable) completed in PAT as a reinforcement to PAT education video. Patient instructed to return PAT pass provided today as well as completed skin preparation sheet (if applicable) on the day of procedure.     Additionally if patient had not viewed video yet but intended to view it at home or in our waiting area, then referred them to the handout with QR code/link provided during PAT visit.  Instructed patient to complete survey after viewing the video in its entirety.  Encouraged patient/family to read PAT general education handouts thoroughly and notify PAT staff with any questions or concerns. Patient verbalized understanding of all information and priority content.    Patient denies any current skin issues.     Patient to apply Chlorhexadine wipes  to surgical area (as instructed) the night before procedure and the AM of procedure. Wipes provided.    Verified patient previously completed cardiology visit for cardiac risk assessment in preparation for  upcoming procedure, completion of 12-lead ECG within six months, and risk assessment letter reviewed. No further interventions required.

## 2023-10-09 ENCOUNTER — ANESTHESIA EVENT (OUTPATIENT)
Dept: PERIOP | Facility: HOSPITAL | Age: 68
End: 2023-10-09
Payer: MEDICARE

## 2023-10-09 RX ORDER — FAMOTIDINE 10 MG/ML
20 INJECTION, SOLUTION INTRAVENOUS ONCE
Status: CANCELLED | OUTPATIENT
Start: 2023-10-09 | End: 2023-10-09

## 2023-10-09 RX ORDER — SODIUM CHLORIDE 9 MG/ML
40 INJECTION, SOLUTION INTRAVENOUS AS NEEDED
Status: CANCELLED | OUTPATIENT
Start: 2023-10-09

## 2023-10-10 ENCOUNTER — HOSPITAL ENCOUNTER (OUTPATIENT)
Facility: HOSPITAL | Age: 68
Setting detail: HOSPITAL OUTPATIENT SURGERY
Discharge: HOME | End: 2023-10-10
Attending: SURGERY | Admitting: SURGERY
Payer: MEDICARE

## 2023-10-10 ENCOUNTER — ANESTHESIA (OUTPATIENT)
Dept: PERIOP | Facility: HOSPITAL | Age: 68
End: 2023-10-10
Payer: MEDICARE

## 2023-10-10 ENCOUNTER — HOSPITAL ENCOUNTER (OUTPATIENT)
Dept: MAMMOGRAPHY | Facility: HOSPITAL | Age: 68
Discharge: HOME | End: 2023-10-10
Payer: MEDICARE

## 2023-10-10 ENCOUNTER — HOSPITAL ENCOUNTER (OUTPATIENT)
Dept: ULTRASOUND IMAGING | Facility: HOSPITAL | Age: 68
Discharge: HOME | End: 2023-10-10
Payer: MEDICARE

## 2023-10-10 ENCOUNTER — HOSPITAL ENCOUNTER (OUTPATIENT)
Dept: NUCLEAR MEDICINE | Facility: HOSPITAL | Age: 68
Discharge: HOME | End: 2023-10-10
Payer: MEDICARE

## 2023-10-10 VITALS
OXYGEN SATURATION: 94 % | SYSTOLIC BLOOD PRESSURE: 116 MMHG | RESPIRATION RATE: 18 BRPM | HEART RATE: 67 BPM | DIASTOLIC BLOOD PRESSURE: 73 MMHG | TEMPERATURE: 97.1 F

## 2023-10-10 DIAGNOSIS — C50.412 MALIGNANT NEOPLASM OF UPPER-OUTER QUADRANT OF LEFT FEMALE BREAST, UNSPECIFIED ESTROGEN RECEPTOR STATUS: ICD-10-CM

## 2023-10-10 DIAGNOSIS — C50.412 MALIGNANT NEOPLASM OF UPPER-OUTER QUADRANT OF LEFT FEMALE BREAST: Primary | ICD-10-CM

## 2023-10-10 LAB — GLUCOSE BLDC GLUCOMTR-MCNC: 103 MG/DL (ref 70–130)

## 2023-10-10 PROCEDURE — A9541 TC99M SULFUR COLLOID: HCPCS | Performed by: SURGERY

## 2023-10-10 PROCEDURE — 0 LIDOCAINE 1 % SOLUTION: Performed by: RADIOLOGY

## 2023-10-10 PROCEDURE — C1819 TISSUE LOCALIZATION-EXCISION: HCPCS

## 2023-10-10 PROCEDURE — 25010000002 ONDANSETRON PER 1 MG: Performed by: NURSE ANESTHETIST, CERTIFIED REGISTERED

## 2023-10-10 PROCEDURE — 19285 PERQ DEV BREAST 1ST US IMAG: CPT | Performed by: RADIOLOGY

## 2023-10-10 PROCEDURE — 77065 DX MAMMO INCL CAD UNI: CPT | Performed by: RADIOLOGY

## 2023-10-10 PROCEDURE — 76098 X-RAY EXAM SURGICAL SPECIMEN: CPT | Performed by: RADIOLOGY

## 2023-10-10 PROCEDURE — 82948 REAGENT STRIP/BLOOD GLUCOSE: CPT

## 2023-10-10 PROCEDURE — 0 TECHNETIUM FILTERED SULFUR COLLOID: Performed by: SURGERY

## 2023-10-10 PROCEDURE — 25010000002 CEFAZOLIN IN DEXTROSE 2000 MG/ 100 ML SOLUTION: Performed by: SURGERY

## 2023-10-10 PROCEDURE — 38792 RA TRACER ID OF SENTINL NODE: CPT

## 2023-10-10 PROCEDURE — 25010000002 PROPOFOL 10 MG/ML EMULSION: Performed by: NURSE ANESTHETIST, CERTIFIED REGISTERED

## 2023-10-10 PROCEDURE — 88342 IMHCHEM/IMCYTCHM 1ST ANTB: CPT | Performed by: SURGERY

## 2023-10-10 PROCEDURE — 25010000002 FENTANYL CITRATE (PF) 100 MCG/2ML SOLUTION: Performed by: NURSE ANESTHETIST, CERTIFIED REGISTERED

## 2023-10-10 PROCEDURE — 25010000002 DEXAMETHASONE PER 1 MG: Performed by: NURSE ANESTHETIST, CERTIFIED REGISTERED

## 2023-10-10 PROCEDURE — 76098 X-RAY EXAM SURGICAL SPECIMEN: CPT

## 2023-10-10 PROCEDURE — 88307 TISSUE EXAM BY PATHOLOGIST: CPT | Performed by: SURGERY

## 2023-10-10 PROCEDURE — 25810000003 LACTATED RINGERS PER 1000 ML: Performed by: ANESTHESIOLOGY

## 2023-10-10 RX ORDER — HYDROMORPHONE HYDROCHLORIDE 1 MG/ML
0.5 INJECTION, SOLUTION INTRAMUSCULAR; INTRAVENOUS; SUBCUTANEOUS
Status: DISCONTINUED | OUTPATIENT
Start: 2023-10-10 | End: 2023-10-10 | Stop reason: HOSPADM

## 2023-10-10 RX ORDER — MIDAZOLAM HYDROCHLORIDE 1 MG/ML
0.5 INJECTION INTRAMUSCULAR; INTRAVENOUS
Status: DISCONTINUED | OUTPATIENT
Start: 2023-10-10 | End: 2023-10-10 | Stop reason: HOSPADM

## 2023-10-10 RX ORDER — SODIUM CHLORIDE 0.9 % (FLUSH) 0.9 %
10 SYRINGE (ML) INJECTION AS NEEDED
Status: DISCONTINUED | OUTPATIENT
Start: 2023-10-10 | End: 2023-10-10 | Stop reason: HOSPADM

## 2023-10-10 RX ORDER — LIDOCAINE HYDROCHLORIDE 10 MG/ML
0.5 INJECTION, SOLUTION EPIDURAL; INFILTRATION; INTRACAUDAL; PERINEURAL ONCE AS NEEDED
Status: COMPLETED | OUTPATIENT
Start: 2023-10-10 | End: 2023-10-10

## 2023-10-10 RX ORDER — LIDOCAINE HYDROCHLORIDE 10 MG/ML
INJECTION, SOLUTION EPIDURAL; INFILTRATION; INTRACAUDAL; PERINEURAL AS NEEDED
Status: DISCONTINUED | OUTPATIENT
Start: 2023-10-10 | End: 2023-10-10 | Stop reason: SURG

## 2023-10-10 RX ORDER — SODIUM CHLORIDE 0.9 % (FLUSH) 0.9 %
10 SYRINGE (ML) INJECTION EVERY 12 HOURS SCHEDULED
Status: DISCONTINUED | OUTPATIENT
Start: 2023-10-10 | End: 2023-10-10 | Stop reason: HOSPADM

## 2023-10-10 RX ORDER — SODIUM CHLORIDE, SODIUM LACTATE, POTASSIUM CHLORIDE, CALCIUM CHLORIDE 600; 310; 30; 20 MG/100ML; MG/100ML; MG/100ML; MG/100ML
9 INJECTION, SOLUTION INTRAVENOUS CONTINUOUS
Status: DISCONTINUED | OUTPATIENT
Start: 2023-10-10 | End: 2023-10-10 | Stop reason: HOSPADM

## 2023-10-10 RX ORDER — FENTANYL CITRATE 50 UG/ML
50 INJECTION, SOLUTION INTRAMUSCULAR; INTRAVENOUS
Status: DISCONTINUED | OUTPATIENT
Start: 2023-10-10 | End: 2023-10-10 | Stop reason: HOSPADM

## 2023-10-10 RX ORDER — FENTANYL CITRATE 50 UG/ML
INJECTION, SOLUTION INTRAMUSCULAR; INTRAVENOUS AS NEEDED
Status: DISCONTINUED | OUTPATIENT
Start: 2023-10-10 | End: 2023-10-10 | Stop reason: SURG

## 2023-10-10 RX ORDER — DEXAMETHASONE SODIUM PHOSPHATE 4 MG/ML
INJECTION, SOLUTION INTRA-ARTICULAR; INTRALESIONAL; INTRAMUSCULAR; INTRAVENOUS; SOFT TISSUE AS NEEDED
Status: DISCONTINUED | OUTPATIENT
Start: 2023-10-10 | End: 2023-10-10 | Stop reason: SURG

## 2023-10-10 RX ORDER — EPHEDRINE SULFATE 50 MG/ML
INJECTION INTRAVENOUS AS NEEDED
Status: DISCONTINUED | OUTPATIENT
Start: 2023-10-10 | End: 2023-10-10 | Stop reason: SURG

## 2023-10-10 RX ORDER — ONDANSETRON 2 MG/ML
INJECTION INTRAMUSCULAR; INTRAVENOUS AS NEEDED
Status: DISCONTINUED | OUTPATIENT
Start: 2023-10-10 | End: 2023-10-10 | Stop reason: SURG

## 2023-10-10 RX ORDER — PROPOFOL 10 MG/ML
VIAL (ML) INTRAVENOUS AS NEEDED
Status: DISCONTINUED | OUTPATIENT
Start: 2023-10-10 | End: 2023-10-10 | Stop reason: SURG

## 2023-10-10 RX ORDER — FAMOTIDINE 20 MG/1
20 TABLET, FILM COATED ORAL ONCE
Status: COMPLETED | OUTPATIENT
Start: 2023-10-10 | End: 2023-10-10

## 2023-10-10 RX ORDER — CEFAZOLIN SODIUM 2 G/100ML
2000 INJECTION, SOLUTION INTRAVENOUS ONCE
Status: COMPLETED | OUTPATIENT
Start: 2023-10-10 | End: 2023-10-10

## 2023-10-10 RX ORDER — LIDOCAINE HYDROCHLORIDE 10 MG/ML
5 INJECTION, SOLUTION INFILTRATION; PERINEURAL ONCE
Status: DISCONTINUED | OUTPATIENT
Start: 2023-10-10 | End: 2023-10-10 | Stop reason: HOSPADM

## 2023-10-10 RX ORDER — TRAMADOL HYDROCHLORIDE 50 MG/1
50 TABLET ORAL EVERY 6 HOURS PRN
Qty: 7 TABLET | Refills: 0 | Status: SHIPPED | OUTPATIENT
Start: 2023-10-10 | End: 2023-10-13

## 2023-10-10 RX ORDER — BUPIVACAINE HYDROCHLORIDE AND EPINEPHRINE 5; 5 MG/ML; UG/ML
INJECTION, SOLUTION EPIDURAL; INTRACAUDAL; PERINEURAL AS NEEDED
Status: DISCONTINUED | OUTPATIENT
Start: 2023-10-10 | End: 2023-10-10 | Stop reason: HOSPADM

## 2023-10-10 RX ORDER — MAGNESIUM HYDROXIDE 1200 MG/15ML
LIQUID ORAL AS NEEDED
Status: DISCONTINUED | OUTPATIENT
Start: 2023-10-10 | End: 2023-10-10 | Stop reason: HOSPADM

## 2023-10-10 RX ORDER — PHENYLEPHRINE HCL IN 0.9% NACL 1 MG/10 ML
SYRINGE (ML) INTRAVENOUS AS NEEDED
Status: DISCONTINUED | OUTPATIENT
Start: 2023-10-10 | End: 2023-10-10 | Stop reason: SURG

## 2023-10-10 RX ORDER — ONDANSETRON 2 MG/ML
4 INJECTION INTRAMUSCULAR; INTRAVENOUS ONCE AS NEEDED
Status: DISCONTINUED | OUTPATIENT
Start: 2023-10-10 | End: 2023-10-10 | Stop reason: HOSPADM

## 2023-10-10 RX ADMIN — LIDOCAINE HYDROCHLORIDE 25 MG: 10 INJECTION, SOLUTION EPIDURAL; INFILTRATION; INTRACAUDAL; PERINEURAL at 12:10

## 2023-10-10 RX ADMIN — EPHEDRINE SULFATE 15 MG: 50 INJECTION INTRAVENOUS at 13:11

## 2023-10-10 RX ADMIN — Medication 100 MCG: at 12:19

## 2023-10-10 RX ADMIN — Medication 100 MCG: at 12:29

## 2023-10-10 RX ADMIN — ONDANSETRON 4 MG: 2 INJECTION INTRAMUSCULAR; INTRAVENOUS at 12:10

## 2023-10-10 RX ADMIN — Medication 100 MCG: at 12:10

## 2023-10-10 RX ADMIN — TECHNETIUM TC 99M SULFUR COLLOID 1 DOSE: KIT at 12:18

## 2023-10-10 RX ADMIN — FENTANYL CITRATE 50 MCG: 50 INJECTION, SOLUTION INTRAMUSCULAR; INTRAVENOUS at 12:10

## 2023-10-10 RX ADMIN — SODIUM CHLORIDE, POTASSIUM CHLORIDE, SODIUM LACTATE AND CALCIUM CHLORIDE 9 ML/HR: 600; 310; 30; 20 INJECTION, SOLUTION INTRAVENOUS at 11:11

## 2023-10-10 RX ADMIN — FAMOTIDINE 20 MG: 20 TABLET, FILM COATED ORAL at 11:11

## 2023-10-10 RX ADMIN — Medication 5 ML: at 08:49

## 2023-10-10 RX ADMIN — CEFAZOLIN SODIUM 2 G: 2 INJECTION, SOLUTION INTRAVENOUS at 12:15

## 2023-10-10 RX ADMIN — Medication 100 MCG: at 12:43

## 2023-10-10 RX ADMIN — PROPOFOL 120 MG: 10 INJECTION, EMULSION INTRAVENOUS at 12:10

## 2023-10-10 RX ADMIN — DEXAMETHASONE SODIUM PHOSPHATE 4 MG: 4 INJECTION, SOLUTION INTRAMUSCULAR; INTRAVENOUS at 12:10

## 2023-10-10 RX ADMIN — FENTANYL CITRATE 50 MCG: 50 INJECTION, SOLUTION INTRAMUSCULAR; INTRAVENOUS at 12:24

## 2023-10-10 RX ADMIN — LIDOCAINE HYDROCHLORIDE 0.5 ML: 10 INJECTION, SOLUTION EPIDURAL; INFILTRATION; INTRACAUDAL; PERINEURAL at 11:11

## 2023-10-10 NOTE — OP NOTE
Operative Note    Justine Fernandes  [id number]   1955     Date of Surgery:  10/10/2023    Pre-Operative Diagnosis: Left breast cancer in the upper outer quadrant    Post-Operative Diagnosis: Left breast cancer in the upper outer quadrant    Procedure: Left sentinel lymph node injection                      Left breast lumpectomy final needle localization                      Left deep subfascial sentinel lymph node biopsy    Anesthesia:  General     [place] Node Biopsy for Breast Cancer - Left  Operation performed with curative intent. Yes   Tracer(s) used to identify sentinel nodes in the upfront surgery (non-neoadjuvant) setting (select all that apply). Radioactive tracer   Tracer(s) used to identify sentinel nodes in the neoadjuvant setting (select all that apply). N/A   All nodes (colored or non-colored) present at the end of a dye-filled lymphatic channel were removed. N/A   All significantly radioactive nodes were removed. Yes   All palpably suspicious nodes were removed. N/A   Biopsy-proven positive nodes marked with clips prior to chemotherapy were identified and removed. N/A          Surgeon:  Chin Carey MD    Circulator: Sully Bowman RN; Jp Ward, ALESSIA; Kim Morales RN  Scrub Person: Myrna Sommer RN; Jabari Craig  Nursing Assistant: Alondra Daily PCT          Estimated Blood Loss: Very minimal    Findings: Breast mass and clip noted in the specimen.  An extended margin was removed    Complications: None      Indication for Procedure: Ms. Fernandes is a pleasant 68-year-old lady who presented with a left breast mass in the upper outer quadrant.  Work-up was remarkable for a 3 cm invasive ductal carcinoma.  She presents today for the above procedure following needle localization by radiology.    Procedure: Patient was taken to the operating by anesthesia and placed supine on the table.  Following induction of general anesthesia using LMA, she received 2 g of Kefzol IV.   SCDs were placed.  550 mCi of radioactive technetium was injected in the left subareolar region.  The left breast, chest, axilla and upper arm were then prepped and draped in a sterile fashion.  Timeout was observed.  Marcaine 0.5% with epinephrine were injected right above the wire in the lateral aspect of the breast.  A small incision was made and dissection was carried through the breast tissue to expose the wire which was transected at skin level.  The breast tissue around the wire was excised with the wire intact.  The mass was easily palpable and was relatively deep over the pectoralis major muscle.  It was removed along with the wire and the specimen was marked with a long silk suture anteriorly and a short suture superiorly with the wire laterally located.  Specimen x-ray showed the clip and the mass in the specimen which was confirmed by radiology then sent to pathology for permanent section.  There was concern about proximity of the mass to the medial anterior arc margin.  As such I excised an anterior, medial, superior and posterior margin and marked with a silk suture on the tumor site.  This was sent separate to pathology for permanent section.  The wound was then irrigated with warm water with clear return of fluid noted.  We then proceeded with the sentinel lymph node biopsy where Marcaine 0.5% with epinephrine was injected in the anterior axillary line.  A small incision was made and dissection was carried deep in the axilla to expose a deep subfascial sentinel lymph node that had very high radioactive count.  This was excised and sent to pathology for permanent section.  No other radioactivity or palpable nodes were appreciated in the axilla.  The wound was then irrigated with water with clear return of fluid noted.  The subcutaneous tissues were then approximated with interrupted 3-0 Vicryl sutures and the skin incisions were approximated with 4-0 Monocryl subcuticular suture.  Steri-Strips and  sterile dressing was applied.  The patient tolerated the procedure well with no complications.  She was extubated and taken to the recovery room in stable condition.  Sponge count and needle count were correct at the end of the procedure.            Chin Carey MD  10/10/23  13:19 EDT

## 2023-10-10 NOTE — INTERVAL H&P NOTE
[facility] Pre-op    Full history and physical note from office is attached.    /82 (BP Location: Right arm, Patient Position: Lying)   Pulse 68   Temp 97.3 °F (36.3 °C) (Temporal)   Resp 18   SpO2 95%     LAB Results:  Lab Results   Component Value Date    WBC 2.91 (L) 10/05/2023    HGB 14.0 10/05/2023    HCT 40.3 10/05/2023    MCV 91.8 10/05/2023     (L) 10/05/2023    NEUTROABS 1.7 06/21/2023    GLUCOSE 89 10/05/2023    BUN 10 10/05/2023    CREATININE 0.67 10/05/2023    EGFRIFNONA 72 12/04/2019    EGFRIFAFRI 96 09/28/2022     10/05/2023    K 4.1 10/05/2023     10/05/2023    CO2 26.0 10/05/2023    CALCIUM 9.7 10/05/2023    ALBUMIN 4.1 10/05/2023    AST 31 10/05/2023    ALT 20 10/05/2023    BILITOT 1.2 10/05/2023     Clinical Information    Abnormal mammogram   Final Diagnosis   LEFT BREAST, 200-300, BIOPSIES:  Invasive ductal carcinoma, grade 2 (tubules 3, nuclear 2, mitoses 2) 11 mm  GJK   Electronically signed by John Wu MD on 8/28/2023 at 1657   Comment    This report was sent to the radiologist at [facility] on 08/28/23.    Gross Description    1. Breast, Left.  Received in formalin labeled -300 irregular mass is a 1.7 x 0.5 x 0.2 cm aggregate of ultrasound-guided yellow-pink fibrofatty breast tissue fragments placed in formalin at 1401 on 8/23/2023, now submitted in block 1A.  The cold ischemic time is less than 60 minutes, total time in formalin is greater than 6 and less than 72 hours.   MLA   Special Stains    Stains performed with adequate controls.  Tumor positive for E-cadherin.  Calponin and p63 support the diagnosis.     IMMUNOHISTOCHEMICAL RESULTS (IHC):  Estrogen Receptor            RESULT: Positive        100%          3+ (INTENSITY SCORE)   Progesterone Receptor     RESULT: Positive        50%          2-3+ (INTENSITY SCORE)   Her2/Bo oncoprotein       RESULT: Negative                          1+ (INTENSITY  SCORE)     Cancer Staging (if applicable)  Cancer Patient: _x_ yes __no __unknown__N/A; If yes, clinical stage  II, T:_2_ N:_0_M:_0_,       Impression: Stage II (T2, N0, M0) invasive ductal carcinoma; ER/DC+ HER2-      Plan: LEFT BREAST LUMPECTOMY WITH NEEDLE LOCALIZATION, LEFT SENTINEL BIOPSY       Lexi Mcneal, CAREN   10/10/2023   11:14 EDT

## 2023-10-10 NOTE — ANESTHESIA POSTPROCEDURE EVALUATION
Patient: Justine Fernandes    Procedure Summary       Date: 10/10/23 Room / Location:  [facility] /  [facility]    Anesthesia Start: 1205 Anesthesia Stop:     Procedure: LEFT BREAST LUMPECTOMY WITH NEEDLE LOCALIZATION, LEFT SENTINEL BIOPSY (Left: Breast) Diagnosis: Malignant neoplasm of upper-outer quadrant of left female breast    Surgeons: Chin Carey MD Provider: Rogelio Mayers MD    Anesthesia Type: general ASA Status: 3            Anesthesia Type: general    Vitals  No vitals data found for the desired time range.          Post Anesthesia Care and Evaluation    Patient location during evaluation: PACU  Patient participation: complete - patient cannot participate  Level of consciousness: obtunded/minimal responses  Pain management: adequate    Airway patency: patent  Anesthetic complications: No anesthetic complications  PONV Status: none  Cardiovascular status: hemodynamically stable and acceptable  Respiratory status: nonlabored ventilation, acceptable and oral airway  Hydration status: acceptable

## 2023-10-10 NOTE — ANESTHESIA PROCEDURE NOTES
Airway  Urgency: elective    Date/Time: 10/10/2023 12:11 PM  Airway not difficult    General Information and Staff    Patient location during procedure: OR  Anesthesiologist: Say Waters MD    Indications and Patient Condition  Indications for airway management: airway protection    Preoxygenated: yes  Mask difficulty assessment: 1 - vent by mask    Final Airway Details  Final airway type: supraglottic airway      Successful airway: I-gel  Size 4     Number of attempts at approach: 1  Assessment: lips, teeth, and gum same as pre-op    Additional Comments  LMA placed without difficulty, ventilation with assist, equal breath sounds and symmetric chest rise and fall

## 2023-10-10 NOTE — BRIEF OP NOTE
BREAST LUMPECTOMY WITH NEEDLE LOCALIZATION  Progress Note    Justine Castrojonathan Fernandes  10/10/2023    Pre-op Diagnosis:   Left breast cancer in the upper outer quadrant       Post-Op Diagnosis Codes:     * Malignant neoplasm of upper-outer quadrant of left female breast [C50.412]    Procedure/CPT® Codes:        Procedure(s):  LEFT BREAST LUMPECTOMY WITH NEEDLE LOCALIZATION, LEFT SENTINEL BIOPSY              Surgeon(s):  Chin Carey MD    Anesthesia: General    Staff:   Circulator: Sully Bowman RN; Jp Ward RN; Kim Morales RN  Scrub Person: Myrna Sommer RN; Jabari Craig  Nursing Assistant: Alondra Daily PCT         Estimated Blood Loss: minimal    Urine Voided: * No values recorded between 10/10/2023 12:04 PM and 10/10/2023  1:16 PM *    Specimens:                Specimens       ID Source Type Tests Collected By Collected At Frozen?    A [place] Lymph Node Tissue TISSUE PATHOLOGY EXAM   Chin Carey MD 10/10/23 1023 No    Description: Left Sentinal Node    B Breast, Left Tissue TISSUE PATHOLOGY EXAM   Chin Carey MD 10/10/23 1023     Description: Left breast lumpectomy, see instructions    Comment: Long stitch anterior, short stitch superior, wire lateral    C Breast, Left Tissue TISSUE PATHOLOGY EXAM   Chin Carey MD 10/10/23 1302     Description: Left Breast, see instructions    Comment: Extended anterior, medial, superior and posterior; silk on tumor side                  Drains: * No LDAs found *    Findings: Clip and mass noted in the specimen        Complications: None          Chin Carey MD     Date: 10/10/2023  Time: 13:17 EDT

## 2023-10-10 NOTE — ANESTHESIA PREPROCEDURE EVALUATION
Anesthesia Evaluation     Patient summary reviewed and Nursing notes reviewed   history of anesthetic complications:  prolonged sedation  NPO Solid Status: > 8 hours  NPO Liquid Status: > 2 hours           Airway   Mallampati: II  TM distance: >3 FB  Neck ROM: full  No difficulty expected  Dental - normal exam     Pulmonary - normal exam    breath sounds clear to auscultation  (+) asthma (Mild),  Cardiovascular - normal exam    ECG reviewed  Rhythm: regular  Rate: normal      ROS comment: Hypertrophic obstructive cardiomyopathy  9/23 Echo: EF 70% LVOT gradient < 10 mmHg    Neuro/Psych- negative ROS  GI/Hepatic/Renal/Endo    (+) obesity, liver disease fatty liver disease, diabetes mellitus type 2, thyroid problem hypothyroidism    Musculoskeletal     Abdominal    Substance History      OB/GYN          Other                      Anesthesia Plan    ASA 3     general     intravenous induction     Anesthetic plan, risks, benefits, and alternatives have been provided, discussed and informed consent has been obtained with: patient.    Plan discussed with CRNA.    CODE STATUS:

## 2023-10-13 LAB
CYTO UR: NORMAL
LAB AP CASE REPORT: NORMAL
LAB AP CLINICAL INFORMATION: NORMAL
LAB AP DIAGNOSIS COMMENT: NORMAL
PATH REPORT.FINAL DX SPEC: NORMAL
PATH REPORT.GROSS SPEC: NORMAL

## 2023-10-23 ENCOUNTER — PATIENT OUTREACH (OUTPATIENT)
Dept: ONCOLOGY | Facility: CLINIC | Age: 68
End: 2023-10-23
Payer: MEDICARE

## 2023-10-24 ENCOUNTER — PATIENT OUTREACH (OUTPATIENT)
Dept: ONCOLOGY | Facility: CLINIC | Age: 68
End: 2023-10-24
Payer: MEDICARE

## 2023-10-24 NOTE — SIGNIFICANT NOTE
Pt called the  Help Line asking about her appointments for tomorrow. She sees Dr. FRIEND at 10 AM and then Oncologist at 2:30 PM. She was asking when she sees Rad Onc. Called Preet at [facility] and she said that Radiation Oncologist will see pt tomorrow after Dr. Friend's appointment. Pt wants to see Medical Oncologist sooner if possible. Pt is going to ask Preet if there is a spot earlier after seeing Radiation Oncologist.

## 2023-10-24 NOTE — PROGRESS NOTES
Subjective     PROBLEM LIST:  pT2 N0 M0 ER positive (100%, 3+) NM positive (50%, 2-3+) HER2 negative (1+) invasive ductal carcinoma of the left breast  Left breast lumpectomy on 10/10/2023.  Pathology showed a 2.7 cm intermediate grade invasive ductal carcinoma.  0 of 1 sentinel lymph node involved.  Anxiety  Cirrhosis  Diabetes  Hyperlipidemia  Hypertrophic obstructive cardiomyopathy  Hypothyroidism  Osteopenia  Sleep apnea    CHIEF COMPLAINT: Breast cancer      HISTORY OF PRESENT ILLNESS:  The patient is a 68 y.o. female, referred for evaluation of a recently diagnosed breast cancer.    She has had a lumpectomy and is recovering well from this.     She reports that she has had low blood counts for several years.  A few years ago she had a bone marrow biopsy in [place] which she reports was normal.  Subsequently she saw Dr. Holley and was diagnosed with nonalcoholic cirrhosis.  She sees him periodically.  She is asymptomatic from this.    She has a diagnosis of hypertrophic obstructive cardiomyopathy and takes Camzyos.    She is retired.  She previously worked as a  as well as other jobs.  She lives alone and sometimes cares for her 4 grandchildren.    REVIEW OF SYSTEMS:  A 14 point review of systems was performed and is negative except as noted above.    Past Medical History:   Diagnosis Date    Anxiety     Asthma     Mild     Breast cancer 10/10/2023    Left Breast    Bundle branch block     Carpal tunnel syndrome, Right     Circulation disorder of lower extremity     legs    Cirrhosis, nonalcoholic     Colon polyps     Congenital heart defect     Diabetes mellitus     Elevated cholesterol     Hyperlipidemia     Hypertrophic obstructive cardiomyopathy     Hypothyroid     Leukopenia     Melanocytic nevus of trunk     Osteopenia     Paresthesia of hand     Senile hyperkeratosis     Sensorineural hearing loss     Sleep apnea     no cpap    Steatosis of liver     Stress incontinence   "            Objective     /71   Pulse 86   Resp 20   Ht 160 cm (63\")   Wt 86.2 kg (190 lb)   BMI 33.66 kg/m²   Performance Status:  ECOG score: 0           General: well appearing female in no acute distress  Neuro: alert and oriented  HEENT: sclerae anicteric, oropharynx clear  Extremities: no lower extremity edema  Skin: no rashes, lesions, bruising, or petechiae  Psych: mood and affect appropriate    Lab Results   Component Value Date    WBC 2.91 (L) 10/05/2023    HGB 14.0 10/05/2023    HCT 40.3 10/05/2023    MCV 91.8 10/05/2023     (L) 10/05/2023     Lab Results   Component Value Date    GLUCOSE 89 10/05/2023    BUN 10 10/05/2023    CREATININE 0.67 10/05/2023    EGFRIFNONA 72 12/04/2019    EGFRIFAFRI 96 09/28/2022    BCR 14.9 10/05/2023    K 4.1 10/05/2023    CO2 26.0 10/05/2023    CALCIUM 9.7 10/05/2023    PROTENTOTREF 8.0 06/21/2023    ALBUMIN 4.1 10/05/2023    LABIL2 1.2 06/21/2023    AST 31 10/05/2023    ALT 20 10/05/2023       Mammo Breast Specimen  Narrative: EXAM: MAMMO BREAST SPECIMEN-     DATE:10/10/2023 12:57 PM     INDICATION: Intraoperative specimen radiograph after wire localization.     COMPARISON: Wire localization dated 10/10/2023, post clip mammogram  dated 8/23/2023.     Impression: FINDINGS/IMPRESSION: 2D/3D intraoperative specimen radiographs were  submitted for review. The wire with hook and mass containing the coil  clip are seen within the specimen. This was called to the operating  room.        This report was finalized on 10/10/2023 12:58 PM by Lexi Read MD.     NM [place] Node Injection Only  This procedure was auto-finalized with no dictation required.  Mammo Post Device Placement Left, US Device Placement Breast Without Biopsy 1st  Narrative: ULTRASOUND GUIDED NEEDLE LOCALIZATION: Left breast breast     HISTORY: Patient is a 68-year-old female who presents for preoperative  localization of malignant mass in the 2:00 left breast 9 cm from the  nipple marked " "by a coil clip.     PROCEDURE: After obtaining informed consent and performing \"time-out\"  the left breast was prepped and draped in usual sterile fashion. A  proximal 4 mL 1% lidocaine without epinephrine was utilized for local  anesthesia. A 7 cm Kopans needle was placed into the 2:00 left breast  mass position under direct sonographic guidance.  A wire was then placed  through the needle and the needle was removed from the breast.     Routine left digital mammographic images were obtained with a BB placed  on the lateral skin edge. The wire hook is through the mass marked by  the coil clip in the expected position. The skin BB is approximately 54  mm from the location of the coil clip within the anterior central mass.  The images were marked and sent to the operating room. No complications  occurred during this procedure.           Impression: Successful needle localization of left breast mass, 2:00  region.     No BI-RADS category is necessary.     This report was finalized on 10/10/2023 8:43 AM by Lexi Read MD.               ASSESSMENT AND PLAN:     Justine Fernandes is a 68 y.o. female with a T2N0M0 ER+ Her2 negative IDC of the left breast.    We discussed the use of the Oncotype recurrence score.  This test provides information about the biology and risk of recurrence for ER positive Her2-negative breast cancers.  It is clear from previous studies that patients who have a low risk Oncotype do not benefit from adjuvant chemotherapy.  Conversely, patients with a high risk Oncotype can have a significant benefit from adjuvant chemotherapy.  Scores in the intermediate risk group may have a small benefit based on the patient's age.  We discussed that the Oncotype test is most useful if chemotherapy is an option that the patient is considering.  In her case I would have some concern about the safety and feasibility of getting chemotherapy given her chronic cytopenias and pre-existing liver disease.  Either " way she would like to have the prognostic information provided by the Oncotype score.    Regardless of the decision about chemotherapy, she is a candidate for adjuvant endocrine therapy with an aromatase inhibitor.      F/u 3 weeks to review oncotype result.             A total greater than 60 mins minutes was spent in face to face patient time, examination, counseling, charting, reviewing test results, and reviewing outside records.    Marisol Hernandez MD    10/25/2023

## 2023-10-25 ENCOUNTER — CONSULT (OUTPATIENT)
Dept: ONCOLOGY | Facility: CLINIC | Age: 68
End: 2023-10-25
Payer: MEDICARE

## 2023-10-25 ENCOUNTER — OFFICE VISIT (OUTPATIENT)
Dept: RADIATION ONCOLOGY | Facility: HOSPITAL | Age: 68
End: 2023-10-25
Payer: MEDICARE

## 2023-10-25 ENCOUNTER — HOSPITAL ENCOUNTER (OUTPATIENT)
Dept: RADIATION ONCOLOGY | Facility: HOSPITAL | Age: 68
Setting detail: RADIATION/ONCOLOGY SERIES
Discharge: HOME | End: 2023-10-25
Payer: MEDICARE

## 2023-10-25 ENCOUNTER — APPOINTMENT (OUTPATIENT)
Dept: RADIATION ONCOLOGY | Facility: HOSPITAL | Age: 68
End: 2023-10-25
Payer: MEDICARE

## 2023-10-25 VITALS
SYSTOLIC BLOOD PRESSURE: 117 MMHG | WEIGHT: 190 LBS | HEART RATE: 86 BPM | BODY MASS INDEX: 33.66 KG/M2 | DIASTOLIC BLOOD PRESSURE: 71 MMHG | RESPIRATION RATE: 20 BRPM | HEIGHT: 63 IN

## 2023-10-25 VITALS
SYSTOLIC BLOOD PRESSURE: 117 MMHG | WEIGHT: 190 LBS | RESPIRATION RATE: 20 BRPM | BODY MASS INDEX: 33.66 KG/M2 | HEIGHT: 63 IN | HEART RATE: 86 BPM | DIASTOLIC BLOOD PRESSURE: 71 MMHG

## 2023-10-25 DIAGNOSIS — Z17.0 MALIGNANT NEOPLASM OF UPPER-OUTER QUADRANT OF LEFT BREAST IN FEMALE, ESTROGEN RECEPTOR POSITIVE: Primary | ICD-10-CM

## 2023-10-25 DIAGNOSIS — C50.912 MALIGNANT NEOPLASM OF LEFT BREAST IN FEMALE, ESTROGEN RECEPTOR POSITIVE, UNSPECIFIED SITE OF BREAST: Primary | ICD-10-CM

## 2023-10-25 DIAGNOSIS — C50.412 MALIGNANT NEOPLASM OF UPPER-OUTER QUADRANT OF LEFT BREAST IN FEMALE, ESTROGEN RECEPTOR POSITIVE: Primary | ICD-10-CM

## 2023-10-25 DIAGNOSIS — Z17.0 MALIGNANT NEOPLASM OF LEFT BREAST IN FEMALE, ESTROGEN RECEPTOR POSITIVE, UNSPECIFIED SITE OF BREAST: Primary | ICD-10-CM

## 2023-10-25 PROCEDURE — G0463 HOSPITAL OUTPT CLINIC VISIT: HCPCS

## 2023-10-25 NOTE — PROGRESS NOTES
New Patient Office Visit      Encounter Date: 10/25/2023   Patient Name: Justine Fernandes  YOB: 1955   Medical Record Number: [id number]   Primary Diagnosis: Malignant neoplasm of upper-outer quadrant of left breast in female, estrogen receptor positive [C50.412, Z17.0]   Cancer Staging: Stage 2a       Chief Complaint:    Chief Complaint   Patient presents with    Breast Cancer       History of Present Illness: Justine Fernandes is a 68 y.o. female who is here for consultation regarding her left breast invasive ductal carcinoma (pT2N0, +/+/- )    She sought medical evaluation for a palpable left breast mass. She has a history of a prior left exicisional biopsy with benign pathology.  8/22/23 - Diagnostic mammogram/US -  irregular isodense mass with ill-defined and spiculated borders present in the posterior approximately 3:00 position of the left breast. Several stable IM nodes noted. Evidence of prior surgery in mid UOQ  8/22 US guided biopsy - grade 2 invasive ductal carcinoma, +/+/-   9/18 - breast MRI - known malignancy identified in left breast, no other findings in either breast concerning for malignancy. Stable IM nodes and reactive appearing axillary LN  10/10 - left lumpectomy and SLN evaluation (Dr. Janis French) - grade 2, 2.7 cm IDC with focal DCIS. Extended margins negative. 0/1 SLN involved. +/+/-. pT2N0    Age at menarche:  14  Age at menopause:  52  Hormone replacement therapy:  Yes - patch - 5 years - stopped 2015  Personal history of breast cancer:  No  Family history of breast cancer:  Yes - Sister, Mother, Maternal Aunts X 2, Maternal Cousins X 3, and Maternal Uncle  Radiation to chest before age of 32:  No  Age of first live birth:  21    Prior Radiation History: no  Pacemaker or ICD: no  Pregnant or Nursing: no    Subjective      Review of Systems: Review of Systems   Constitutional:  Positive for fatigue.   HENT:  Positive for hearing loss.          Chronic [place] - no hearing aides   Genitourinary:         Pt reports mild urinary stress incontinence       Past Medical History:   Past Medical History:   Diagnosis Date    Anxiety     Asthma     Mild     Breast cancer 10/10/2023    Left Breast    Bundle branch block     Carpal tunnel syndrome, Right     Circulation disorder of lower extremity     legs    Cirrhosis, nonalcoholic     Colon polyps     Congenital heart defect     Diabetes mellitus     Elevated cholesterol     Hyperlipidemia     Hypertrophic obstructive cardiomyopathy     Hypothyroid     Leukopenia     Melanocytic nevus of trunk     Osteopenia     Paresthesia of hand     Senile hyperkeratosis     Sensorineural hearing loss     Sleep apnea     no cpap    Steatosis of liver     Stress incontinence        Past Surgical History:   Past Surgical History:   Procedure Laterality Date    BLADDER SURGERY  1973    BREAST CYST EXCISION Left 2000    excisional biopsy    BREAST LUMPECTOMY Left 10/10/2023    Procedure: BREAST LUMPECTOMY WITH NEEDLE LOCALIZATION, SENTINEL BIOPSY LEFT;  Surgeon: Chin Carey MD;  Location: [facility];  Service: General;  Laterality: Left;    CARPAL TUNNEL RELEASE  2018    right     COLONOSCOPY      KIDNEY STONE SURGERY  1994    KNEE SURGERY  2019    meniscus repair- left knee    NASAL POLYP SURGERY  2006 1999    NASAL SEPTUM SURGERY  1999    REPLACEMENT TOTAL HIP LATERAL POSITION Right 10/02/2020    TONSILLECTOMY AND ADENOIDECTOMY      TUBAL ABDOMINAL LIGATION      VAGINAL REPAIR  1976    VARICOSE VEIN SURGERY  2006    Right leg     WISDOM TOOTH EXTRACTION         Family History:   Family History   Problem Relation Age of Onset    Breast cancer Mother 80    Heart disease Mother     Cancer Mother     Colon cancer Mother     Arthritis Mother     Angina Mother     Osteoporosis Mother     Hyperlipidemia Mother     Stroke Father     Colon cancer Father     Heart attack Father     Lung cancer Father     Cancer Sister     Migraines  "Sister     Breast cancer Sister 66    Cancer Sister     Cancer Brother     Arthritis Brother     Hypertension Brother     Cancer Brother     Diabetes Brother     Breast cancer Maternal Cousin 42       Social History:   Social History     Socioeconomic History    Marital status:    Tobacco Use    Smoking status: Never    Smokeless tobacco: Never   Vaping Use    Vaping Use: Never used   Substance and Sexual Activity    Alcohol use: Never    Drug use: Never    Sexual activity: Defer       Medications:     Current Outpatient Medications:     albuterol sulfate  (90 Base) MCG/ACT inhaler, ProAir HFA 90 mcg/actuation aerosol inhaler  Every six hours, Disp: , Rfl:     escitalopram (LEXAPRO) 10 MG tablet, Take 1 tablet by mouth Every Morning., Disp: 90 tablet, Rfl: 1    Mavacamten (Camzyos) 2.5 MG capsule, , Disp: , Rfl:     metFORMIN (GLUCOPHAGE) 500 MG tablet, Take 1 tablet by mouth 2 (Two) Times a Day With Meals., Disp: 180 tablet, Rfl: 1    Synthroid 50 MCG tablet, Take 1 tablet by mouth Daily., Disp: 90 tablet, Rfl: 1    Allergies:   No Known Allergies    Measures:   Advanced Care Plan: N Advanced Care Planning was discussed. The patient does not have a living will documented in the medical record and declined to perform one today.  KPS/Quality of Life: 80 - Restricted Physical Activity  ECOG: (1) Restricted in physically strenuous activity, ambulatory and able to do work of light nature      Objective     Physical Exam:   Vital Signs:   Vitals:    10/25/23 1057   BP: 117/71   Pulse: 86   Resp: 20   Weight: 86.2 kg (190 lb)   Height: 160 cm (63\")   PainSc: 0-No pain     Body mass index is 33.66 kg/m².     Constitutional: The patient is a well-developed, well-nourished female  in no acute distress.  Alert and oriented ×3.  General: NAD, sitting comfortably  Eye: EOMI, anicteric sclerae  HENT: NC/AT, MMM  Neck: No JVD or cervical lymphadenopathy  Respiratory: Symmetric expansion, nonlabored " respiration  Cardiovascular: Regular rate and rhythm.  No murmurs, rubs, or gallops are appreciated.  Abdomen: nontender, nondistended.   Musculoskeletal: No obvious joint deformities, range of motion intact in all 4 extremities  Neuro: Alert oriented x3, cranial nerves III through XII are grossly intact, with no focal neurological deficits noted on exam.  Psych: Mood and affect appropriate  L breast incisions healing well, no discharge/drainage/erythema, no regional LAD. Right breast and regional LN normal      Assessment / Plan      Assessment/Plan:   Justine Fernandes is a pleasant 68 y.o. female with a pT2N0 +/+/- invasive ductal carcinoma of the left breast managed with a lumpectomy and SLN evaluation on 10/10/23. Her tumor was resected with negative margins.    She understands that the role of radiation therapy after a lumpectomy is to decrease the risk of an ipsilateral breast tumor recurrence. She asked several informed and intelligent questions and would like to proceed with treatment. I recommended 40.05 Gy/15 fractions directed towards the left breast, using DIBH to minimize radiation dose to the heart.     We discussed anticipated cosmetic outcomes as well as acute and late toxicities associated with radiation therapy. For her case, radiation related toxicity would be secondary to effects of radiation on the skin, remaining breast tissue, ipsilateral lung, and heart. Acute toxicities include fatigue, dermatitis, changes in skin pigmentation, and pneumonitis. Late toxicities include telangectasias, soft tissue fibrosis, pulmonary fibrosis, lymphedema, and an increased personal risk of cardiac events. She understands that while radiation is used as an important part of management with her existing diagnosis, there is a small possibility that she may develop a radiation-induced secondary malignancy in the treatment field in the future.     She has an appointment with Dr. Hernandez this afternoon. If  chemotherapy is recommended, I will wait until completion before starting radiation. If it is not recommended, I will bring her back in 3-4 weeks to allow ample time for wound healing and have a re-evaluation with CT sim to follow.      Diagnoses and all orders for this visit:    1. Malignant neoplasm of upper-outer quadrant of left breast in female, estrogen receptor positive (Primary)         Follow Up:  Return in about 4 weeks (around 11/22/2023) for Office Visit.      Time:   I spent 65 minutes on this encounter today, 10/25/23. Activities that took place during this time include: preparing to see the patient, obtaining history, reviewing separately obtained history, performing a medically appropriate examination and evaluation, counseling and educating the patient, ordering medications/tests/procedures, communicating with other healthcare providers, documenting clinical information in the health record, and coordinating care for this patient.     Sincerely,        Lizz Hussein MD  Radiation Oncology  This document has been signed by Lizz Hussein MD on October 25, 2023 12:08 EDT     NOTICE TO PATIENTS:   At [facility], we believe that sharing information builds trust and better relationships. You are receiving this note because you recently visited [facility]. It is possible you will see health information before a provider has talked with you about it. This kind of information can be easy to misunderstand. To help you fully understand what it means for your health, we urge you to discuss this note with your provider.

## 2023-10-25 NOTE — LETTER
October 25, 2023     Chin Carey MD  [address]  [facility] [id number]    Patient: Justine Fernandes   YOB: 1955   Date of Visit: 10/25/2023       Dear Chin Carey MD    Justine Fernandes was in my office today. Below is a copy of my note.    If you have questions, please do not hesitate to call me. I look forward to following Justine along with you.         Sincerely,        Marisol Hernandez MD        CC: Radha Cooney PA-C      Subjective    PROBLEM LIST:  pT2 N0 M0 ER positive (100%, 3+) SC positive (50%, 2-3+) HER2 negative (1+) invasive ductal carcinoma of the left breast  Left breast lumpectomy on 10/10/2023.  Pathology showed a 2.7 cm intermediate grade invasive ductal carcinoma.  0 of 1 sentinel lymph node involved.  Anxiety  Cirrhosis  Diabetes  Hyperlipidemia  Hypertrophic obstructive cardiomyopathy  Hypothyroidism  Osteopenia  Sleep apnea    CHIEF COMPLAINT: Breast cancer      HISTORY OF PRESENT ILLNESS:  The patient is a 68 y.o. female, referred for evaluation of a recently diagnosed breast cancer.    She has had a lumpectomy and is recovering well from this.     She reports that she has had low blood counts for several years.  A few years ago she had a bone marrow biopsy in [place] which she reports was normal.  Subsequently she saw Dr. Holley and was diagnosed with nonalcoholic cirrhosis.  She sees him periodically.  She is asymptomatic from this.    She has a diagnosis of hypertrophic obstructive cardiomyopathy and takes Camzyos.    She is retired.  She previously worked as a  as well as other jobs.  She lives alone and sometimes cares for her 4 grandchildren.    REVIEW OF SYSTEMS:  A 14 point review of systems was performed and is negative except as noted above.    Past Medical History:   Diagnosis Date   • Anxiety    • Asthma     Mild    • Breast cancer 10/10/2023    Left Breast   • Bundle branch block    • Carpal tunnel syndrome, Right  "   • Circulation disorder of lower extremity     legs   • Cirrhosis, nonalcoholic    • Colon polyps    • Congenital heart defect    • Diabetes mellitus    • Elevated cholesterol    • Hyperlipidemia    • Hypertrophic obstructive cardiomyopathy    • Hypothyroid    • Leukopenia    • Melanocytic nevus of trunk    • Osteopenia    • Paresthesia of hand    • Senile hyperkeratosis    • Sensorineural hearing loss    • Sleep apnea     no cpap   • Steatosis of liver    • Stress incontinence              Objective    /71   Pulse 86   Resp 20   Ht 160 cm (63\")   Wt 86.2 kg (190 lb)   BMI 33.66 kg/m²   Performance Status:  ECOG score: 0           General: well appearing female in no acute distress  Neuro: alert and oriented  HEENT: sclerae anicteric, oropharynx clear  Extremities: no lower extremity edema  Skin: no rashes, lesions, bruising, or petechiae  Psych: mood and affect appropriate    Lab Results   Component Value Date    WBC 2.91 (L) 10/05/2023    HGB 14.0 10/05/2023    HCT 40.3 10/05/2023    MCV 91.8 10/05/2023     (L) 10/05/2023     Lab Results   Component Value Date    GLUCOSE 89 10/05/2023    BUN 10 10/05/2023    CREATININE 0.67 10/05/2023    EGFRIFNONA 72 12/04/2019    EGFRIFAFRI 96 09/28/2022    BCR 14.9 10/05/2023    K 4.1 10/05/2023    CO2 26.0 10/05/2023    CALCIUM 9.7 10/05/2023    PROTENTOTREF 8.0 06/21/2023    ALBUMIN 4.1 10/05/2023    LABIL2 1.2 06/21/2023    AST 31 10/05/2023    ALT 20 10/05/2023       Mammo Breast Specimen  Narrative: EXAM: MAMMO BREAST SPECIMEN-     DATE:10/10/2023 12:57 PM     INDICATION: Intraoperative specimen radiograph after wire localization.     COMPARISON: Wire localization dated 10/10/2023, post clip mammogram  dated 8/23/2023.     Impression: FINDINGS/IMPRESSION: 2D/3D intraoperative specimen radiographs were  submitted for review. The wire with hook and mass containing the coil  clip are seen within the specimen. This was called to the operating  room.      " "  This report was finalized on 10/10/2023 12:58 PM by Lexi Read MD.     NM [place] Node Injection Only  This procedure was auto-finalized with no dictation required.  Mammo Post Device Placement Left, US Device Placement Breast Without Biopsy 1st  Narrative: ULTRASOUND GUIDED NEEDLE LOCALIZATION: Left breast breast     HISTORY: Patient is a 68-year-old female who presents for preoperative  localization of malignant mass in the 2:00 left breast 9 cm from the  nipple marked by a coil clip.     PROCEDURE: After obtaining informed consent and performing \"time-out\"  the left breast was prepped and draped in usual sterile fashion. A  proximal 4 mL 1% lidocaine without epinephrine was utilized for local  anesthesia. A 7 cm Kopans needle was placed into the 2:00 left breast  mass position under direct sonographic guidance.  A wire was then placed  through the needle and the needle was removed from the breast.     Routine left digital mammographic images were obtained with a BB placed  on the lateral skin edge. The wire hook is through the mass marked by  the coil clip in the expected position. The skin BB is approximately 54  mm from the location of the coil clip within the anterior central mass.  The images were marked and sent to the operating room. No complications  occurred during this procedure.           Impression: Successful needle localization of left breast mass, 2:00  region.     No BI-RADS category is necessary.     This report was finalized on 10/10/2023 8:43 AM by Lexi Read MD.               ASSESSMENT AND PLAN:     Justine Fernandes is a 68 y.o. female with a T2N0M0 ER+ Her2 negative IDC of the left breast.    We discussed the use of the Oncotype recurrence score.  This test provides information about the biology and risk of recurrence for ER positive Her2-negative breast cancers.  It is clear from previous studies that patients who have a low risk Oncotype do not benefit from adjuvant " chemotherapy.  Conversely, patients with a high risk Oncotype can have a significant benefit from adjuvant chemotherapy.  Scores in the intermediate risk group may have a small benefit based on the patient's age.  We discussed that the Oncotype test is most useful if chemotherapy is an option that the patient is considering.  In her case I would have some concern about the safety and feasibility of getting chemotherapy given her chronic cytopenias and pre-existing liver disease.  Either way she would like to have the prognostic information provided by the Oncotype score.    Regardless of the decision about chemotherapy, she is a candidate for adjuvant endocrine therapy with an aromatase inhibitor.      F/u 3 weeks to review oncotype result.             A total greater than 60 mins minutes was spent in face to face patient time, examination, counseling, charting, reviewing test results, and reviewing outside records.    Marisol Hernandez MD    10/25/2023

## 2023-10-27 ENCOUNTER — TELEPHONE (OUTPATIENT)
Dept: RADIATION ONCOLOGY | Facility: HOSPITAL | Age: 68
End: 2023-10-27
Payer: MEDICARE

## 2023-10-27 NOTE — TELEPHONE ENCOUNTER
Returned patient's call and left a VM as patient wishes to schedule Re-Eval and CT Sim with Dr. Hussein.  Contact information provided and requested that patient call next week to schedule.

## 2023-11-06 ENCOUNTER — TELEPHONE (OUTPATIENT)
Dept: RADIATION ONCOLOGY | Facility: HOSPITAL | Age: 68
End: 2023-11-06
Payer: MEDICARE

## 2023-11-06 NOTE — TELEPHONE ENCOUNTER
Patient returned call to schedule CT Sim for Thursday, 11/16/23 at 3:00 pm.  Patient signed consents during consult on 10/25/23.  Patient verbalized an understanding of date, time, and location of appointment.  Patient has my contact information and will call with any questions or concerns.    Sravani Licea : spoke to trauma shx, will see pt. Trauma surgery saw the patient and recommended admission for observation and pain control.  Patient says he wants to go home.  I discussed risks of going home without observation.  Patient understands risk this prefer to go home.  Will discharge with oxycodone Tylenol Motrin.  We will give him strict return precautions for new or worsening symptoms.  Will discharge with incentive spirometer and instruct him to use it 4 times an hour.  We will have him follow-up with cardiothoracic surgery Dr. Raphael Meza MD

## 2023-11-07 ENCOUNTER — TELEMEDICINE (OUTPATIENT)
Dept: ONCOLOGY | Facility: CLINIC | Age: 68
End: 2023-11-07
Payer: MEDICARE

## 2023-11-07 DIAGNOSIS — Z17.0 MALIGNANT NEOPLASM OF LEFT BREAST IN FEMALE, ESTROGEN RECEPTOR POSITIVE, UNSPECIFIED SITE OF BREAST: Primary | ICD-10-CM

## 2023-11-07 DIAGNOSIS — C50.912 MALIGNANT NEOPLASM OF LEFT BREAST IN FEMALE, ESTROGEN RECEPTOR POSITIVE, UNSPECIFIED SITE OF BREAST: Primary | ICD-10-CM

## 2023-11-07 RX ORDER — ANASTROZOLE 1 MG/1
1 TABLET ORAL DAILY
Qty: 90 TABLET | Refills: 3 | Status: SHIPPED | OUTPATIENT
Start: 2023-11-07

## 2023-11-07 NOTE — PROGRESS NOTES
PROBLEM LIST:  pT2 N0 M0 ER positive (100%, 3+) RI positive (50%, 2-3+) HER2 negative (1+) invasive ductal carcinoma of the left breast  Left breast lumpectomy on 10/10/2023.  Pathology showed a 2.7 cm intermediate grade invasive ductal carcinoma.  0 of 1 sentinel lymph node involved.  Oncotype score 18.  Anxiety  Cirrhosis  Diabetes  Hyperlipidemia  Hypertrophic obstructive cardiomyopathy  Hypothyroidism  Osteopenia  Sleep apnea    Subjective     CHIEF COMPLAINT: breast cancer    HISTORY OF PRESENT ILLNESS:   Justine Fernandes returns for follow-up.   She presents to review her oncotype results.      Objective      There were no vitals taken for this visit.     Performance Status:        General: well appearing female in no acute distress  Neuro: alert and oriented  HEENT: sclera anicteric, oropharynx clear  Lymphatics: No masses by inspection  Lungs: Respiratory effort appears normal  Skin: no rashes, lesions, bruising, or petechiae  Psych: mood and affect appropriate          RECENT LABS:  Lab Results   Component Value Date    WBC 2.91 (L) 10/05/2023    HGB 14.0 10/05/2023    HCT 40.3 10/05/2023    MCV 91.8 10/05/2023     (L) 10/05/2023       Lab Results   Component Value Date    GLUCOSE 89 10/05/2023    BUN 10 10/05/2023    CREATININE 0.67 10/05/2023    EGFRIFNONA 72 12/04/2019    EGFRIFAFRI 96 09/28/2022    BCR 14.9 10/05/2023    K 4.1 10/05/2023    CO2 26.0 10/05/2023    CALCIUM 9.7 10/05/2023    PROTENTOTREF 8.0 06/21/2023    ALBUMIN 4.1 10/05/2023    LABIL2 1.2 06/21/2023    AST 31 10/05/2023    ALT 20 10/05/2023               ASSESSMENT AND PLAN:     Justine Fernandes is a 68 y.o. female with a T2N0M0 ER+ Her2 negative IDC of the left breast.     She has an oncotype score of 18, which is a low risk result.  Based on this, I would not recommend adjuvant chemotherapy.  I do recommend that she proceed with radiation therapy followed by endocrine therapy.  She already has an appt next week for  radiation planning.      I will send anastrozole to her pharmacy.  She can start taking this after completing radiation.  We reviewed the possibility of menopause-like side effects.    Osteopenia: she had a dexa scan 8/20/23.  Plan to repeat in 2 years.    F/u in February to see how she is tolerating treatment.  If she is doing well then we will see her every 6 months.                        Marisol Hernandez MD  [facility]ology [facility]    11/7/2023          CC:

## 2023-11-16 ENCOUNTER — HOSPITAL ENCOUNTER (OUTPATIENT)
Dept: RADIATION ONCOLOGY | Facility: HOSPITAL | Age: 68
Setting detail: RADIATION/ONCOLOGY SERIES
Discharge: HOME | End: 2023-11-16
Payer: MEDICARE

## 2023-11-16 PROCEDURE — 77290 THER RAD SIMULAJ FIELD CPLX: CPT | Performed by: RADIOLOGY

## 2023-11-16 PROCEDURE — 77333 RADIATION TREATMENT AID(S): CPT | Performed by: RADIOLOGY

## 2023-11-28 PROCEDURE — 77334 RADIATION TREATMENT AID(S): CPT | Performed by: RADIOLOGY

## 2023-11-28 PROCEDURE — 77295 3-D RADIOTHERAPY PLAN: CPT | Performed by: RADIOLOGY

## 2023-11-28 PROCEDURE — 77300 RADIATION THERAPY DOSE PLAN: CPT | Performed by: RADIOLOGY

## 2023-11-29 ENCOUNTER — HOSPITAL ENCOUNTER (OUTPATIENT)
Dept: RADIATION ONCOLOGY | Facility: HOSPITAL | Age: 68
Discharge: HOME | End: 2023-11-29

## 2023-11-29 PROCEDURE — 77280 THER RAD SIMULAJ FIELD SMPL: CPT | Performed by: RADIOLOGY

## 2023-11-30 ENCOUNTER — HOSPITAL ENCOUNTER (OUTPATIENT)
Dept: RADIATION ONCOLOGY | Facility: HOSPITAL | Age: 68
Discharge: HOME | End: 2023-11-30

## 2023-11-30 PROCEDURE — 77387 GUIDANCE FOR RADJ TX DLVR: CPT | Performed by: RADIOLOGY

## 2023-11-30 PROCEDURE — 77412 RADIATION TX DELIVERY LVL 3: CPT | Performed by: RADIOLOGY

## 2023-12-01 ENCOUNTER — HOSPITAL ENCOUNTER (OUTPATIENT)
Dept: RADIATION ONCOLOGY | Facility: HOSPITAL | Age: 68
Setting detail: RADIATION/ONCOLOGY SERIES
Discharge: HOME | End: 2023-12-01
Payer: MEDICARE

## 2023-12-01 ENCOUNTER — HOSPITAL ENCOUNTER (OUTPATIENT)
Dept: RADIATION ONCOLOGY | Facility: HOSPITAL | Age: 68
Setting detail: RADIATION/ONCOLOGY SERIES
Discharge: HOME | End: 2023-12-01

## 2023-12-01 PROCEDURE — 77387 GUIDANCE FOR RADJ TX DLVR: CPT | Performed by: RADIOLOGY

## 2023-12-01 PROCEDURE — 77412 RADIATION TX DELIVERY LVL 3: CPT | Performed by: RADIOLOGY

## 2023-12-04 ENCOUNTER — HOSPITAL ENCOUNTER (OUTPATIENT)
Dept: RADIATION ONCOLOGY | Facility: HOSPITAL | Age: 68
Discharge: HOME | End: 2023-12-04
Payer: MEDICARE

## 2023-12-04 PROCEDURE — 77387 GUIDANCE FOR RADJ TX DLVR: CPT | Performed by: RADIOLOGY

## 2023-12-04 PROCEDURE — 77412 RADIATION TX DELIVERY LVL 3: CPT | Performed by: RADIOLOGY

## 2023-12-05 ENCOUNTER — HOSPITAL ENCOUNTER (OUTPATIENT)
Dept: RADIATION ONCOLOGY | Facility: HOSPITAL | Age: 68
Discharge: HOME | End: 2023-12-05

## 2023-12-05 VITALS — BODY MASS INDEX: 32.98 KG/M2 | WEIGHT: 186.2 LBS

## 2023-12-05 PROCEDURE — 77387 GUIDANCE FOR RADJ TX DLVR: CPT | Performed by: RADIOLOGY

## 2023-12-05 PROCEDURE — 77412 RADIATION TX DELIVERY LVL 3: CPT | Performed by: RADIOLOGY

## 2023-12-06 ENCOUNTER — HOSPITAL ENCOUNTER (OUTPATIENT)
Dept: RADIATION ONCOLOGY | Facility: HOSPITAL | Age: 68
Discharge: HOME | End: 2023-12-06

## 2023-12-06 PROCEDURE — 77412 RADIATION TX DELIVERY LVL 3: CPT | Performed by: RADIOLOGY

## 2023-12-06 PROCEDURE — 77387 GUIDANCE FOR RADJ TX DLVR: CPT | Performed by: RADIOLOGY

## 2023-12-07 ENCOUNTER — HOSPITAL ENCOUNTER (OUTPATIENT)
Dept: RADIATION ONCOLOGY | Facility: HOSPITAL | Age: 68
Discharge: HOME | End: 2023-12-07

## 2023-12-07 PROCEDURE — 77387 GUIDANCE FOR RADJ TX DLVR: CPT | Performed by: RADIOLOGY

## 2023-12-07 PROCEDURE — 77412 RADIATION TX DELIVERY LVL 3: CPT | Performed by: RADIOLOGY

## 2023-12-08 ENCOUNTER — HOSPITAL ENCOUNTER (OUTPATIENT)
Dept: RADIATION ONCOLOGY | Facility: HOSPITAL | Age: 68
Discharge: HOME | End: 2023-12-08

## 2023-12-08 PROCEDURE — 77412 RADIATION TX DELIVERY LVL 3: CPT | Performed by: RADIOLOGY

## 2023-12-08 PROCEDURE — 77387 GUIDANCE FOR RADJ TX DLVR: CPT | Performed by: RADIOLOGY

## 2023-12-08 PROCEDURE — 77336 RADIATION PHYSICS CONSULT: CPT | Performed by: RADIOLOGY

## 2023-12-11 ENCOUNTER — HOSPITAL ENCOUNTER (OUTPATIENT)
Dept: RADIATION ONCOLOGY | Facility: HOSPITAL | Age: 68
Discharge: HOME | End: 2023-12-11
Payer: MEDICARE

## 2023-12-11 PROCEDURE — 77412 RADIATION TX DELIVERY LVL 3: CPT | Performed by: RADIOLOGY

## 2023-12-11 PROCEDURE — 77387 GUIDANCE FOR RADJ TX DLVR: CPT | Performed by: RADIOLOGY

## 2023-12-12 ENCOUNTER — HOSPITAL ENCOUNTER (OUTPATIENT)
Dept: RADIATION ONCOLOGY | Facility: HOSPITAL | Age: 68
Discharge: HOME | End: 2023-12-12

## 2023-12-12 VITALS — BODY MASS INDEX: 33.43 KG/M2 | WEIGHT: 188.7 LBS

## 2023-12-12 PROCEDURE — 77387 GUIDANCE FOR RADJ TX DLVR: CPT | Performed by: RADIOLOGY

## 2023-12-12 PROCEDURE — 77412 RADIATION TX DELIVERY LVL 3: CPT | Performed by: RADIOLOGY

## 2023-12-13 ENCOUNTER — HOSPITAL ENCOUNTER (OUTPATIENT)
Dept: RADIATION ONCOLOGY | Facility: HOSPITAL | Age: 68
Discharge: HOME | End: 2023-12-13

## 2023-12-13 PROCEDURE — 77412 RADIATION TX DELIVERY LVL 3: CPT | Performed by: RADIOLOGY

## 2023-12-13 PROCEDURE — 77387 GUIDANCE FOR RADJ TX DLVR: CPT | Performed by: RADIOLOGY

## 2023-12-14 ENCOUNTER — HOSPITAL ENCOUNTER (OUTPATIENT)
Dept: RADIATION ONCOLOGY | Facility: HOSPITAL | Age: 68
Discharge: HOME | End: 2023-12-14

## 2023-12-14 PROCEDURE — 77336 RADIATION PHYSICS CONSULT: CPT | Performed by: RADIOLOGY

## 2023-12-14 PROCEDURE — 77387 GUIDANCE FOR RADJ TX DLVR: CPT | Performed by: RADIOLOGY

## 2023-12-14 PROCEDURE — 77412 RADIATION TX DELIVERY LVL 3: CPT | Performed by: RADIOLOGY

## 2023-12-15 ENCOUNTER — HOSPITAL ENCOUNTER (OUTPATIENT)
Dept: RADIATION ONCOLOGY | Facility: HOSPITAL | Age: 68
Discharge: HOME | End: 2023-12-15

## 2023-12-15 PROCEDURE — 77412 RADIATION TX DELIVERY LVL 3: CPT | Performed by: RADIOLOGY

## 2023-12-15 PROCEDURE — 77387 GUIDANCE FOR RADJ TX DLVR: CPT | Performed by: RADIOLOGY

## 2023-12-18 ENCOUNTER — HOSPITAL ENCOUNTER (OUTPATIENT)
Dept: RADIATION ONCOLOGY | Facility: HOSPITAL | Age: 68
Discharge: HOME | End: 2023-12-18
Payer: MEDICARE

## 2023-12-18 PROCEDURE — 77412 RADIATION TX DELIVERY LVL 3: CPT | Performed by: RADIOLOGY

## 2023-12-18 PROCEDURE — 77387 GUIDANCE FOR RADJ TX DLVR: CPT | Performed by: RADIOLOGY

## 2023-12-19 ENCOUNTER — HOSPITAL ENCOUNTER (OUTPATIENT)
Dept: RADIATION ONCOLOGY | Facility: HOSPITAL | Age: 68
Discharge: HOME | End: 2023-12-19

## 2023-12-19 VITALS — BODY MASS INDEX: 33.36 KG/M2 | WEIGHT: 188.3 LBS

## 2023-12-19 PROCEDURE — 77412 RADIATION TX DELIVERY LVL 3: CPT | Performed by: RADIOLOGY

## 2023-12-19 PROCEDURE — 77387 GUIDANCE FOR RADJ TX DLVR: CPT | Performed by: RADIOLOGY

## 2023-12-20 ENCOUNTER — HOSPITAL ENCOUNTER (OUTPATIENT)
Dept: RADIATION ONCOLOGY | Facility: HOSPITAL | Age: 68
Discharge: HOME | End: 2023-12-20
Payer: MEDICARE

## 2023-12-20 DIAGNOSIS — C50.412 MALIGNANT NEOPLASM OF UPPER-OUTER QUADRANT OF LEFT BREAST IN FEMALE, ESTROGEN RECEPTOR POSITIVE: Primary | ICD-10-CM

## 2023-12-20 DIAGNOSIS — Z17.0 MALIGNANT NEOPLASM OF UPPER-OUTER QUADRANT OF LEFT BREAST IN FEMALE, ESTROGEN RECEPTOR POSITIVE: Primary | ICD-10-CM

## 2023-12-20 PROCEDURE — 77387 GUIDANCE FOR RADJ TX DLVR: CPT | Performed by: RADIOLOGY

## 2023-12-20 PROCEDURE — 77412 RADIATION TX DELIVERY LVL 3: CPT | Performed by: RADIOLOGY

## 2023-12-28 ENCOUNTER — TRANSCRIBE ORDERS (OUTPATIENT)
Dept: ADMINISTRATIVE | Facility: HOSPITAL | Age: 68
End: 2023-12-28
Payer: MEDICARE

## 2023-12-28 DIAGNOSIS — R92.8 ABNORMAL MAMMOGRAM: Primary | ICD-10-CM

## 2024-01-09 DIAGNOSIS — E11.9 TYPE 2 DIABETES MELLITUS WITHOUT COMPLICATION, WITHOUT LONG-TERM CURRENT USE OF INSULIN: Primary | ICD-10-CM

## 2024-01-09 DIAGNOSIS — Z13.220 LIPID SCREENING: ICD-10-CM

## 2024-01-09 DIAGNOSIS — E03.9 HYPOTHYROIDISM, UNSPECIFIED TYPE: ICD-10-CM

## 2024-01-09 DIAGNOSIS — Z13.21 ENCOUNTER FOR VITAMIN DEFICIENCY SCREENING: ICD-10-CM

## 2024-01-09 DIAGNOSIS — E55.9 VITAMIN D DEFICIENCY: ICD-10-CM

## 2024-01-10 ENCOUNTER — LAB (OUTPATIENT)
Dept: LAB | Facility: HOSPITAL | Age: 69
End: 2024-01-10
Payer: COMMERCIAL

## 2024-01-10 DIAGNOSIS — Z13.21 ENCOUNTER FOR VITAMIN DEFICIENCY SCREENING: ICD-10-CM

## 2024-01-10 DIAGNOSIS — E03.9 HYPOTHYROIDISM, UNSPECIFIED TYPE: ICD-10-CM

## 2024-01-10 DIAGNOSIS — E55.9 VITAMIN D DEFICIENCY: ICD-10-CM

## 2024-01-10 DIAGNOSIS — Z13.220 LIPID SCREENING: ICD-10-CM

## 2024-01-10 DIAGNOSIS — E11.9 TYPE 2 DIABETES MELLITUS WITHOUT COMPLICATION, WITHOUT LONG-TERM CURRENT USE OF INSULIN: ICD-10-CM

## 2024-01-11 LAB
25(OH)D3+25(OH)D2 SERPL-MCNC: 47.7 NG/ML (ref 30–100)
ALBUMIN SERPL-MCNC: 4.1 G/DL (ref 3.5–5.2)
ALBUMIN/GLOB SERPL: 1.2 G/DL
ALP SERPL-CCNC: 83 U/L (ref 39–117)
ALT SERPL-CCNC: 20 U/L (ref 1–33)
AST SERPL-CCNC: 25 U/L (ref 1–32)
BASOPHILS # BLD AUTO: 0.03 10*3/MM3 (ref 0–0.2)
BASOPHILS NFR BLD AUTO: 1.3 % (ref 0–1.5)
BILIRUB SERPL-MCNC: 0.8 MG/DL (ref 0–1.2)
BUN SERPL-MCNC: 11 MG/DL (ref 8–23)
BUN/CREAT SERPL: 14.1 (ref 7–25)
CALCIUM SERPL-MCNC: 10.4 MG/DL (ref 8.6–10.5)
CHLORIDE SERPL-SCNC: 101 MMOL/L (ref 98–107)
CHOLEST SERPL-MCNC: 223 MG/DL (ref 0–200)
CO2 SERPL-SCNC: 26 MMOL/L (ref 22–29)
CREAT SERPL-MCNC: 0.78 MG/DL (ref 0.57–1)
EGFRCR SERPLBLD CKD-EPI 2021: 83.9 ML/MIN/1.73
EOSINOPHIL # BLD AUTO: 0.1 10*3/MM3 (ref 0–0.4)
EOSINOPHIL NFR BLD AUTO: 4.2 % (ref 0.3–6.2)
ERYTHROCYTE [DISTWIDTH] IN BLOOD BY AUTOMATED COUNT: 14.3 % (ref 12.3–15.4)
GLOBULIN SER CALC-MCNC: 3.4 GM/DL
GLUCOSE SERPL-MCNC: 107 MG/DL (ref 65–99)
HBA1C MFR BLD: 5.6 % (ref 4.8–5.6)
HCT VFR BLD AUTO: 40 % (ref 34–46.6)
HDLC SERPL-MCNC: 62 MG/DL (ref 40–60)
HGB BLD-MCNC: 14.1 G/DL (ref 12–15.9)
IMM GRANULOCYTES # BLD AUTO: 0 10*3/MM3 (ref 0–0.05)
IMM GRANULOCYTES NFR BLD AUTO: 0 % (ref 0–0.5)
LDLC SERPL CALC-MCNC: 121 MG/DL (ref 0–100)
LYMPHOCYTES # BLD AUTO: 0.66 10*3/MM3 (ref 0.7–3.1)
LYMPHOCYTES NFR BLD AUTO: 28 % (ref 19.6–45.3)
MCH RBC QN AUTO: 31.5 PG (ref 26.6–33)
MCHC RBC AUTO-ENTMCNC: 35.3 G/DL (ref 31.5–35.7)
MCV RBC AUTO: 89.3 FL (ref 79–97)
MONOCYTES # BLD AUTO: 0.22 10*3/MM3 (ref 0.1–0.9)
MONOCYTES NFR BLD AUTO: 9.3 % (ref 5–12)
NEUTROPHILS # BLD AUTO: 1.35 10*3/MM3 (ref 1.7–7)
NEUTROPHILS NFR BLD AUTO: 57.2 % (ref 42.7–76)
NRBC BLD AUTO-RTO: 0 /100 WBC (ref 0–0.2)
PLATELET # BLD AUTO: 110 10*3/MM3 (ref 140–450)
POTASSIUM SERPL-SCNC: 4.7 MMOL/L (ref 3.5–5.2)
PROT SERPL-MCNC: 7.5 G/DL (ref 6–8.5)
RBC # BLD AUTO: 4.48 10*6/MM3 (ref 3.77–5.28)
SODIUM SERPL-SCNC: 136 MMOL/L (ref 136–145)
T3FREE SERPL-MCNC: 2.9 PG/ML (ref 2–4.4)
T4 FREE SERPL-MCNC: 1.16 NG/DL (ref 0.93–1.7)
TRIGL SERPL-MCNC: 228 MG/DL (ref 0–150)
TSH SERPL DL<=0.005 MIU/L-ACNC: 4.04 UIU/ML (ref 0.27–4.2)
VIT B12 SERPL-MCNC: 426 PG/ML (ref 211–946)
VLDLC SERPL CALC-MCNC: 40 MG/DL (ref 5–40)
WBC # BLD AUTO: 2.36 10*3/MM3 (ref 3.4–10.8)

## 2024-01-17 PROBLEM — Z17.0 MALIGNANT NEOPLASM OF UPPER-OUTER QUADRANT OF LEFT BREAST IN FEMALE, ESTROGEN RECEPTOR POSITIVE: Status: ACTIVE | Noted: 2024-01-17

## 2024-01-17 PROBLEM — C50.412 MALIGNANT NEOPLASM OF UPPER-OUTER QUADRANT OF LEFT BREAST IN FEMALE, ESTROGEN RECEPTOR POSITIVE: Status: ACTIVE | Noted: 2024-01-17

## 2024-01-18 ENCOUNTER — OFFICE VISIT (OUTPATIENT)
Dept: INTERNAL MEDICINE | Facility: CLINIC | Age: 69
End: 2024-01-18
Payer: MEDICARE

## 2024-01-18 VITALS
WEIGHT: 190.6 LBS | TEMPERATURE: 97.9 F | SYSTOLIC BLOOD PRESSURE: 118 MMHG | HEIGHT: 63 IN | HEART RATE: 78 BPM | DIASTOLIC BLOOD PRESSURE: 76 MMHG | BODY MASS INDEX: 33.77 KG/M2

## 2024-01-18 DIAGNOSIS — Z17.0 MALIGNANT NEOPLASM OF UPPER-OUTER QUADRANT OF LEFT BREAST IN FEMALE, ESTROGEN RECEPTOR POSITIVE: Primary | ICD-10-CM

## 2024-01-18 DIAGNOSIS — I42.2 HYPERTROPHIC CARDIOMYOPATHY: ICD-10-CM

## 2024-01-18 DIAGNOSIS — K74.69 OTHER CIRRHOSIS OF LIVER: ICD-10-CM

## 2024-01-18 DIAGNOSIS — E11.9 TYPE 2 DIABETES MELLITUS WITHOUT COMPLICATION, WITHOUT LONG-TERM CURRENT USE OF INSULIN: ICD-10-CM

## 2024-01-18 DIAGNOSIS — K64.2 GRADE III HEMORRHOIDS: ICD-10-CM

## 2024-01-18 DIAGNOSIS — C50.412 MALIGNANT NEOPLASM OF UPPER-OUTER QUADRANT OF LEFT BREAST IN FEMALE, ESTROGEN RECEPTOR POSITIVE: Primary | ICD-10-CM

## 2024-01-18 DIAGNOSIS — E03.9 ACQUIRED HYPOTHYROIDISM: ICD-10-CM

## 2024-01-18 DIAGNOSIS — D69.6 PLATELETS DECREASED: ICD-10-CM

## 2024-01-18 DIAGNOSIS — F41.9 ANXIETY: ICD-10-CM

## 2024-01-18 RX ORDER — ANORECTAL OINTMENT 15.7; .44; 24; 20.6 G/100G; G/100G; G/100G; G/100G
1 OINTMENT TOPICAL 2 TIMES DAILY
Qty: 71 G | Refills: 3 | Status: SHIPPED | OUTPATIENT
Start: 2024-01-18

## 2024-01-18 RX ORDER — LEVOTHYROXINE SODIUM 50 MCG
50 TABLET ORAL DAILY
Qty: 90 TABLET | Refills: 1 | Status: SHIPPED | OUTPATIENT
Start: 2024-01-18 | End: 2024-01-22 | Stop reason: SDUPTHER

## 2024-01-18 RX ORDER — ESCITALOPRAM OXALATE 10 MG/1
10 TABLET ORAL EVERY MORNING
Qty: 90 TABLET | Refills: 1 | Status: SHIPPED | OUTPATIENT
Start: 2024-01-18 | End: 2024-01-22 | Stop reason: SDUPTHER

## 2024-01-18 NOTE — PROGRESS NOTES
[facility] Internal Medicine    Justine Fernandes  1955   [id number]      Patient Care Team:  Radha Cooney PA-C as PCP - General (Physician Assistant)  Magdiel Junior MD as Consulting Physician (Cardiology)  Roscoe Holley DO as Consulting Physician (Gastroenterology)  Royal Briceño MD as Consulting Physician (Hematology and Oncology)  Mable Ortega APRN as Nurse Practitioner (Family Medicine)  Lizz Hussein MD as Consulting Physician (Radiation Oncology)  Chin Carey MD as Referring Physician (General Surgery)  Marisol Hernandez MD as Consulting Physician (Hematology and Oncology)    Chief Complaint::   Chief Complaint   Patient presents with    Type 2 Diabetes     Follow-up    Breast Cancer        HPI  Justine Fernandes is a 68-year-old female date of birth 1955 that returns today for follow-up.  Past medical history significant for breast cancer status post radiation, type 2 diabetes, anxiety, hypothyroidism.  Was last seen in the office in June.  Subsequent mammogram found suspicious lump on left breast.  Patient underwent lumpectomy with sentinel load resection on 10/10/2023 by Dr. NEIL.  Next underwent 15 treatments of radiation prior to Victor.  She is now taking anastrozole.  Follows with Dr. NEIL and Dr. Hussein next Wednesday.   She has seen Dr. Bynum for hemorrhoid.  She request refill of prescription today.  Labs recently obtained showing stable thyroid levels, blood sugar levels remain in range.  She walks in her home and tries to stay active.  She will walk outside, weather pending. She is fearful of falls.   She currently follows with cardiology with echo every 3 months.  She does have occasional chest pain, denies shortness of breath or palpitations.      Patient Active Problem List   Diagnosis    Anxiety    Type 2 diabetes mellitus without complication, without long-term current use of insulin    Congenital heart defect    Sleep apnea     Leukopenia    Hypothyroidism    Fatigue    Other cirrhosis of liver    Platelets decreased    Welcome to Medicare preventive visit    Papules    Hypertrophic cardiomyopathy    Routine medical exam    Breast pain, left    Post-menopausal    Malignant neoplasm of upper-outer quadrant of left breast in female, estrogen receptor positive    Grade III hemorrhoids        Past Medical History:   Diagnosis Date    Anxiety     Asthma     Mild     Breast cancer 10/10/2023    Left Breast    Bundle branch block     Carpal tunnel syndrome, Right     Circulation disorder of lower extremity     legs    Cirrhosis, nonalcoholic     Colon polyps     Congenital heart defect     Diabetes mellitus     Elevated cholesterol     Hyperlipidemia     Hypertrophic obstructive cardiomyopathy     Hypothyroid     Leukopenia     Melanocytic nevus of trunk     Osteopenia     Paresthesia of hand     Routine medical exam 06/30/2023    Senile hyperkeratosis     Sensorineural hearing loss     Sleep apnea     no cpap    Steatosis of liver     Stress incontinence        Past Surgical History:   Procedure Laterality Date    BLADDER SURGERY  1973    BREAST CYST EXCISION Left 2000    excisional biopsy    BREAST LUMPECTOMY Left 10/10/2023    Procedure: BREAST LUMPECTOMY WITH NEEDLE LOCALIZATION, SENTINEL BIOPSY LEFT;  Surgeon: Chin Carey MD;  Location: [facility];  Service: General;  Laterality: Left;    CARPAL TUNNEL RELEASE  2018    right     COLONOSCOPY      KIDNEY STONE SURGERY  1994    KNEE SURGERY  2019    meniscus repair- left knee    NASAL POLYP SURGERY  2006 1999    NASAL SEPTUM SURGERY  1999    REPLACEMENT TOTAL HIP LATERAL POSITION Right 10/02/2020    TONSILLECTOMY AND ADENOIDECTOMY      TUBAL ABDOMINAL LIGATION      VAGINAL REPAIR  1976    VARICOSE VEIN SURGERY  2006    Right leg     WISDOM TOOTH EXTRACTION         Family History   Problem Relation Age of Onset    Breast cancer Mother 80    Heart disease Mother     Cancer Mother      Colon cancer Mother     Arthritis Mother     Angina Mother     Osteoporosis Mother     Hyperlipidemia Mother     Stroke Father     Colon cancer Father     Heart attack Father     Lung cancer Father     Cancer Sister     Migraines Sister     Breast cancer Sister 66    Cancer Sister     Cancer Brother     Arthritis Brother     Hypertension Brother     Cancer Brother     Diabetes Brother     Breast cancer Maternal Cousin 42       Social History     Socioeconomic History    Marital status:    Tobacco Use    Smoking status: Never    Smokeless tobacco: Never   Vaping Use    Vaping Use: Never used   Substance and Sexual Activity    Alcohol use: Never    Drug use: Never    Sexual activity: Defer       No Known Allergies    Review of Systems   Constitutional:  Negative for activity change, appetite change, diaphoresis, fatigue, unexpected weight gain and unexpected weight loss.   HENT:  Negative for hearing loss.    Eyes:  Negative for visual disturbance.   Respiratory:  Negative for chest tightness and shortness of breath.    Cardiovascular:  Positive for chest pain. Negative for palpitations and leg swelling.   Gastrointestinal:  Negative for abdominal pain, blood in stool, GERD and indigestion.   Endocrine: Negative for cold intolerance and heat intolerance.   Genitourinary:  Negative for dysuria and hematuria.   Musculoskeletal:  Negative for arthralgias and myalgias.   Skin:  Negative for skin lesions.   Neurological:  Negative for tremors, seizures, syncope, speech difficulty, weakness, headache, memory problem and confusion.   Hematological:  Does not bruise/bleed easily.   Psychiatric/Behavioral:  Negative for sleep disturbance and depressed mood. The patient is not nervous/anxious.         Vital Signs  Vitals:    01/18/24 1443   BP: 118/76   BP Location: Right arm   Patient Position: Sitting   Cuff Size: Adult   Pulse: 78   Temp: 97.9 °F (36.6 °C)   TempSrc: Temporal   Weight: 86.5 kg (190 lb 9.6 oz)  "  Height: 160 cm (62.99\")   PainSc: 0-No pain     Body mass index is 33.77 kg/m².        Advance Care Planning   ACP discussion was held with the patient during this visit. Patient does not have an advance directive, information provided.       Current Outpatient Medications:     albuterol sulfate  (90 Base) MCG/ACT inhaler, ProAir HFA 90 mcg/actuation aerosol inhaler  Every six hours, Disp: , Rfl:     anastrozole (ARIMIDEX) 1 MG tablet, Take 1 tablet by mouth Daily., Disp: 90 tablet, Rfl: 3    escitalopram (LEXAPRO) 10 MG tablet, Take 1 tablet by mouth Every Morning., Disp: 90 tablet, Rfl: 1    Mavacamten (Camzyos) 2.5 MG capsule, , Disp: , Rfl:     metFORMIN (GLUCOPHAGE) 500 MG tablet, Take 1 tablet by mouth 2 (Two) Times a Day With Meals., Disp: 180 tablet, Rfl: 1    Synthroid 50 MCG tablet, Take 1 tablet by mouth Daily., Disp: 90 tablet, Rfl: 1    Menthol-Zinc Oxide (Calmoseptine) 0.44-20.6 % ointment, Apply 1 application  topically to the appropriate area as directed 2 (Two) Times a Day., Disp: 71 g, Rfl: 3    Physical Exam  Vitals reviewed.   Constitutional:       Appearance: Normal appearance. She is well-developed.   HENT:      Head: Normocephalic and atraumatic.      Right Ear: Hearing, tympanic membrane, ear canal and external ear normal.      Left Ear: Hearing, tympanic membrane, ear canal and external ear normal.      Nose: Nose normal.      Mouth/Throat:      Pharynx: Uvula midline.   Eyes:      General: Lids are normal.      Conjunctiva/sclera: Conjunctivae normal.      Pupils: Pupils are equal, round, and reactive to light.   Cardiovascular:      Rate and Rhythm: Normal rate and regular rhythm.      Heart sounds: Normal heart sounds.   Pulmonary:      Effort: Pulmonary effort is normal.      Breath sounds: Normal breath sounds.   Chest:          Comments: Surgical scar-there is palpable thickness at incision site of left breast.  Abdominal:      General: Bowel sounds are normal.      " Palpations: Abdomen is soft.   Musculoskeletal:         General: Normal range of motion.      Cervical back: Full passive range of motion without pain, normal range of motion and neck supple.   Skin:     General: Skin is warm and dry.   Neurological:      Mental Status: She is alert and oriented to person, place, and time.      Deep Tendon Reflexes: Reflexes are normal and symmetric.   Psychiatric:         Speech: Speech normal.         Behavior: Behavior normal.         Thought Content: Thought content normal.         Judgment: Judgment normal.          ACE III MINI            Results Review:    Recent Results (from the past 672 hour(s))   Lipid Panel    Collection Time: 01/10/24  1:18 PM    Specimen: Blood    Blood  Release to michel   Result Value Ref Range    Total Cholesterol 223 (H) 0 - 200 mg/dL    Triglycerides 228 (H) 0 - 150 mg/dL    HDL Cholesterol 62 (H) 40 - 60 mg/dL    VLDL Cholesterol Venkata 40 5 - 40 mg/dL    LDL Chol Calc (NIH) 121 (H) 0 - 100 mg/dL   Vitamin D,25-Hydroxy    Collection Time: 01/10/24  1:18 PM    Specimen: Blood    Blood  Release to michel   Result Value Ref Range    25 Hydroxy, Vitamin D 47.7 30.0 - 100.0 ng/ml   Vitamin B12    Collection Time: 01/10/24  1:18 PM    Specimen: Blood    Blood  Release to michel   Result Value Ref Range    Vitamin B-12 426 211 - 946 pg/mL   T3, Free    Collection Time: 01/10/24  1:18 PM    Specimen: Blood    Blood  Release to michel   Result Value Ref Range    T3, Free 2.9 2.0 - 4.4 pg/mL   T4, Free    Collection Time: 01/10/24  1:18 PM    Specimen: Blood    Blood  Release to michel   Result Value Ref Range    Free T4 1.16 0.93 - 1.70 ng/dL   TSH    Collection Time: 01/10/24  1:18 PM    Specimen: Blood    Blood  Release to michel   Result Value Ref Range    TSH 4.040 0.270 - 4.200 uIU/mL   Hemoglobin A1c    Collection Time: 01/10/24  1:18 PM    Specimen: Blood    Blood  Release to michel   Result Value Ref Range    Hemoglobin A1C 5.60 4.80 - 5.60 %   Comprehensive  Metabolic Panel    Collection Time: 01/10/24  1:18 PM    Specimen: Blood    Blood  Release to michel   Result Value Ref Range    Glucose 107 (H) 65 - 99 mg/dL    BUN 11 8 - 23 mg/dL    Creatinine 0.78 0.57 - 1.00 mg/dL    EGFR Result 83.9 >60.0 mL/min/1.73    BUN/Creatinine Ratio 14.1 7.0 - 25.0    Sodium 136 136 - 145 mmol/L    Potassium 4.7 3.5 - 5.2 mmol/L    Chloride 101 98 - 107 mmol/L    Total CO2 26.0 22.0 - 29.0 mmol/L    Calcium 10.4 8.6 - 10.5 mg/dL    Total Protein 7.5 6.0 - 8.5 g/dL    Albumin 4.1 3.5 - 5.2 g/dL    Globulin 3.4 gm/dL    A/G Ratio 1.2 g/dL    Total Bilirubin 0.8 0.0 - 1.2 mg/dL    Alkaline Phosphatase 83 39 - 117 U/L    AST (SGOT) 25 1 - 32 U/L    ALT (SGPT) 20 1 - 33 U/L   CBC & Differential    Collection Time: 01/10/24  1:18 PM    Specimen: Blood    Blood  Release to michel   Result Value Ref Range    WBC 2.36 (L) 3.40 - 10.80 10*3/mm3    RBC 4.48 3.77 - 5.28 10*6/mm3    Hemoglobin 14.1 12.0 - 15.9 g/dL    Hematocrit 40.0 34.0 - 46.6 %    MCV 89.3 79.0 - 97.0 fL    MCH 31.5 26.6 - 33.0 pg    MCHC 35.3 31.5 - 35.7 g/dL    RDW 14.3 12.3 - 15.4 %    Platelets 110 (L) 140 - 450 10*3/mm3    Neutrophil Rel % 57.2 42.7 - 76.0 %    Lymphocyte Rel % 28.0 19.6 - 45.3 %    Monocyte Rel % 9.3 5.0 - 12.0 %    Eosinophil Rel % 4.2 0.3 - 6.2 %    Basophil Rel % 1.3 0.0 - 1.5 %    Neutrophils Absolute 1.35 (L) 1.70 - 7.00 10*3/mm3    Lymphocytes Absolute 0.66 (L) 0.70 - 3.10 10*3/mm3    Monocytes Absolute 0.22 0.10 - 0.90 10*3/mm3    Eosinophils Absolute 0.10 0.00 - 0.40 10*3/mm3    Basophils Absolute 0.03 0.00 - 0.20 10*3/mm3    Immature Granulocyte Rel % 0.0 0.0 - 0.5 %    Immature Grans Absolute 0.00 0.00 - 0.05 10*3/mm3    nRBC 0.0 0.0 - 0.2 /100 WBC     Procedures    Medication Review: Medications reviewed and noted    Social History     Socioeconomic History    Marital status:    Tobacco Use    Smoking status: Never    Smokeless tobacco: Never   Vaping Use    Vaping Use: Never used    Substance and Sexual Activity    Alcohol use: Never    Drug use: Never    Sexual activity: Defer        Assessment/Plan:    Diagnoses and all orders for this visit:    1. Malignant neoplasm of upper-outer quadrant of left breast in female, estrogen receptor positive (Primary)  Overview:  pT2 N0 M0 ER positive (100%, 3+) VA positive (50%, 2-3+) HER2 negative (1+) invasive ductal carcinoma of the left breast  Left breast lumpectomy on 10/10/2023.  Pathology showed a 2.7 cm intermediate grade invasive ductal carcinoma.  0 of 1 sentinel lymph node involved.  Oncotype score 18.  She has completed 15 radiation treatments.  Now on anastrozole 1 mg tablets daily.      2. Hypertrophic cardiomyopathy  Overview:  3 months since starting mavacamten 2.5mg capsules daily.  Follows cardiology closely.  She has ECHO every 3 months.      3. Grade III hemorrhoids  -     Menthol-Zinc Oxide (Calmoseptine) 0.44-20.6 % ointment; Apply 1 application  topically to the appropriate area as directed 2 (Two) Times a Day.  Dispense: 71 g; Refill: 3    4. Anxiety  Overview:  Currently taking Lexapro 10mg daily which controls this well.    Orders:  -     escitalopram (LEXAPRO) 10 MG tablet; Take 1 tablet by mouth Every Morning.  Dispense: 90 tablet; Refill: 1    5. Type 2 diabetes mellitus without complication, without long-term current use of insulin  Overview:  Currently taking metformin 500mg daily.    Orders:  -     metFORMIN (GLUCOPHAGE) 500 MG tablet; Take 1 tablet by mouth 2 (Two) Times a Day With Meals.  Dispense: 180 tablet; Refill: 1    6. Acquired hypothyroidism  Overview:  Currently taking levothyroxine 50mg daily. Labs today.     Orders:  -     Synthroid 50 MCG tablet; Take 1 tablet by mouth Daily.  Dispense: 90 tablet; Refill: 1    7. Platelets decreased  Comments:  Labs reviewed.  Stable.    8. Other cirrhosis of liver  Overview:  History of elevated Ast, ALT, and bilirubin. Follows with Dr. Holley.             Plan of care  reviewed with patient at the conclusion of today's visit. Education was provided regarding diagnosis, management, and any prescribed or recommended OTC medications.Patient verbalizes understanding of and agreement with management plan.         I spent 30 minutes caring for Justine on this date of service. This time includes time spent by me in the following activities:preparing for the visit, reviewing tests, obtaining and/or reviewing a separately obtained history, performing a medically appropriate examination and/or evaluation , counseling and educating the patient/family/caregiver, ordering medications, tests, or procedures, referring and communicating with other health care professionals , and documenting information in the medical record    Radha Cooney PA-C      Note: Part of this note may be an electronic transcription/translation of spoken language to printed text using the Dragon Dictation system.

## 2024-01-19 PROBLEM — K64.2 GRADE III HEMORRHOIDS: Status: ACTIVE | Noted: 2024-01-19

## 2024-01-19 NOTE — PROGRESS NOTES
Mrs. Fernandes completed radiation treatment in our department. Details are below:    Site: Left breast  Dose: 40.05 Gy/15 fractions  Dates: 11/30/23 - 12/20/23   Technique: Treatment was planned using a 3D technique with daily DIBH and daily image guidance  Tolerance: Toxicities were assessed and managed on a weekly basis. Dermatitis was managed supportively. She tolerated treatment well and completed without incident.  KPS 80  Follow up: return to clinic in 1 mo

## 2024-01-22 DIAGNOSIS — E11.9 TYPE 2 DIABETES MELLITUS WITHOUT COMPLICATION, WITHOUT LONG-TERM CURRENT USE OF INSULIN: ICD-10-CM

## 2024-01-22 DIAGNOSIS — E03.9 ACQUIRED HYPOTHYROIDISM: ICD-10-CM

## 2024-01-22 DIAGNOSIS — F41.9 ANXIETY: ICD-10-CM

## 2024-01-22 RX ORDER — ESCITALOPRAM OXALATE 10 MG/1
10 TABLET ORAL EVERY MORNING
Qty: 90 TABLET | Refills: 1 | Status: SHIPPED | OUTPATIENT
Start: 2024-01-22

## 2024-01-22 RX ORDER — LEVOTHYROXINE SODIUM 50 MCG
50 TABLET ORAL DAILY
Qty: 90 TABLET | Refills: 1 | Status: SHIPPED | OUTPATIENT
Start: 2024-01-22

## 2024-01-23 ENCOUNTER — TRANSCRIBE ORDERS (OUTPATIENT)
Dept: PHYSICAL THERAPY | Facility: HOSPITAL | Age: 69
End: 2024-01-23
Payer: MEDICARE

## 2024-01-23 DIAGNOSIS — C50.412 MALIGNANT NEOPLASM OF UPPER-OUTER QUADRANT OF LEFT FEMALE BREAST, UNSPECIFIED ESTROGEN RECEPTOR STATUS: Primary | ICD-10-CM

## 2024-01-24 ENCOUNTER — HOSPITAL ENCOUNTER (OUTPATIENT)
Dept: RADIATION ONCOLOGY | Facility: HOSPITAL | Age: 69
Setting detail: RADIATION/ONCOLOGY SERIES
Discharge: HOME | End: 2024-01-24
Payer: MEDICARE

## 2024-01-24 ENCOUNTER — OFFICE VISIT (OUTPATIENT)
Dept: RADIATION ONCOLOGY | Facility: HOSPITAL | Age: 69
End: 2024-01-24
Payer: MEDICARE

## 2024-01-24 ENCOUNTER — HOSPITAL ENCOUNTER (OUTPATIENT)
Dept: PHYSICAL THERAPY | Facility: HOSPITAL | Age: 69
Setting detail: THERAPIES SERIES
Discharge: HOME | End: 2024-01-24
Payer: MEDICARE

## 2024-01-24 VITALS
HEART RATE: 71 BPM | HEIGHT: 63 IN | DIASTOLIC BLOOD PRESSURE: 75 MMHG | RESPIRATION RATE: 20 BRPM | OXYGEN SATURATION: 95 % | SYSTOLIC BLOOD PRESSURE: 117 MMHG | BODY MASS INDEX: 34.2 KG/M2 | WEIGHT: 193 LBS

## 2024-01-24 DIAGNOSIS — Z17.0 MALIGNANT NEOPLASM OF UPPER-OUTER QUADRANT OF LEFT BREAST IN FEMALE, ESTROGEN RECEPTOR POSITIVE: Primary | ICD-10-CM

## 2024-01-24 DIAGNOSIS — C50.412 MALIGNANT NEOPLASM OF UPPER-OUTER QUADRANT OF LEFT BREAST IN FEMALE, ESTROGEN RECEPTOR POSITIVE: Primary | ICD-10-CM

## 2024-01-24 DIAGNOSIS — C50.912 MALIGNANT NEOPLASM OF LEFT FEMALE BREAST, UNSPECIFIED ESTROGEN RECEPTOR STATUS, UNSPECIFIED SITE OF BREAST: Primary | ICD-10-CM

## 2024-01-24 PROCEDURE — 97162 PT EVAL MOD COMPLEX 30 MIN: CPT

## 2024-01-24 PROCEDURE — 93702 BIS XTRACELL FLUID ANALYSIS: CPT

## 2024-01-24 NOTE — THERAPY DISCHARGE NOTE
Outpatient Physical Therapy Lymphedema Initial Evaluation & Discharge  [facility]     Patient Name: Justine Fernandes  : 1955  MRN: [id number]  Today's Date: 2024      Visit Date: 2024    Visit Dx:    ICD-10-CM ICD-9-CM   1. Malignant neoplasm of left female breast, unspecified estrogen receptor status, unspecified site of breast  C50.912 174.9       Patient Active Problem List   Diagnosis    Anxiety    Type 2 diabetes mellitus without complication, without long-term current use of insulin    Congenital heart defect    Sleep apnea    Leukopenia    Hypothyroidism    Fatigue    Other cirrhosis of liver    Platelets decreased    Welcome to Medicare preventive visit    Papules    Hypertrophic cardiomyopathy    Routine medical exam    Breast pain, left    Post-menopausal    Malignant neoplasm of upper-outer quadrant of left breast in female, estrogen receptor positive    Grade III hemorrhoids        Past Medical History:   Diagnosis Date    Anxiety     Asthma     Mild     Breast cancer 10/10/2023    Left Breast    Bundle branch block     Carpal tunnel syndrome, Right     Circulation disorder of lower extremity     legs    Cirrhosis, nonalcoholic     Colon polyps     Congenital heart defect     Diabetes mellitus     Elevated cholesterol     Hyperlipidemia     Hypertrophic obstructive cardiomyopathy     Hypothyroid     Leukopenia     Melanocytic nevus of trunk     Osteopenia     Paresthesia of hand     Routine medical exam 2023    Senile hyperkeratosis     Sensorineural hearing loss     Sleep apnea     no cpap    Steatosis of liver     Stress incontinence         Past Surgical History:   Procedure Laterality Date    BLADDER SURGERY  1973    BREAST CYST EXCISION Left     excisional biopsy    BREAST LUMPECTOMY Left 10/10/2023    Procedure: BREAST LUMPECTOMY WITH NEEDLE LOCALIZATION, SENTINEL BIOPSY LEFT;  Surgeon: Chin Carey MD;  Location: [facility];  Service: General;   Laterality: Left;    CARPAL TUNNEL RELEASE  2018    right     COLONOSCOPY      KIDNEY STONE SURGERY  1994    KNEE SURGERY  2019    meniscus repair- left knee    NASAL POLYP SURGERY  2006 1999    NASAL SEPTUM SURGERY  1999    REPLACEMENT TOTAL HIP LATERAL POSITION Right 10/02/2020    TONSILLECTOMY AND ADENOIDECTOMY      TUBAL ABDOMINAL LIGATION      VAGINAL REPAIR  1976    VARICOSE VEIN SURGERY  2006    Right leg     WISDOM TOOTH EXTRACTION              Lymphedema       Row Name 01/24/24 1325             Subjective Pain    Able to rate subjective pain? yes  -CA      Pre-Treatment Pain Level 0  -CA      Post-Treatment Pain Level 0  -CA         Subjective    Subjective Comments Pt underwent a L lumpectomy with SLNB 10- followed by radiation to the L chest. She notes she is healing well and so pleased with her care.  -CA         Lymphedema Assessment    Lymphedema Classification LUE:;at risk/stage 0  -CA      Lymphedema Cancer Related Sx lumpectomy;left;sentinel node biopsy  -CA      Radiation Therapy Received yes  -CA      Radiation Treatments #/Timeframe 15 until 12-  -CA      Adverse Radiation Reactions/Complication none noted per pt  -CA         Posture/Observations    Alignment Options Forward head;Rounded shoulders  -CA      Forward Head Bilateral:;Mild  -CA      Rounded Shoulders Bilateral:;Mild  -CA         General ROM    GENERAL ROM COMMENTS WFL without pain  -CA         MMT (Manual Muscle Testing)    Lt Upper Ext Lt Shoulder Flexion;Lt Shoulder ABduction;Lt Shoulder Internal Rotation;Lt Shoulder External Rotation  -CA         MMT Left Upper Ext    Lt Shoulder Flexion MMT, Gross Movement (4/5) good  -CA      Lt Shoulder ABduction MMT, Gross Movement (4/5) good  -CA      Lt Shoulder Internal Rotation MMT, Gross Movement (4/5) good  -CA      Lt Shoulder External Rotation MMT, Gross Movement (4/5) good  -CA      Lt Upper Extremity Comments  without pain  -CA         Hand  Strength      Strength Affected Side Left  -CA          Strength Left    Left  Test 1 42  -CA      Left  Test 2 42  -CA      Left  Test 3 43  -CA       Strength Average Left 42.33  -CA         Lymphedema Edema Assessment    Edema Assessment Comment No edema present at this time.  -CA         Lymphedema Sensation    Lymphedema Sensation Reports RUE:;LUE:  -CA      Lymphedema Sensation Tests light touch  -CA      Lymphedema Light Touch WNL  -CA         L-Dex Bioimpedence Screening    L-Dex Measurement Extremity LUE  -CA      L-Dex Patient Position Standing  -CA      L-Dex UE Dominate Side Right  -CA      L-Dex UE At Risk Side Left  -CA      L-Dex UE Pre Surgical Value No  -CA      L-Dex UE Score 2  -CA      L-Dex UE Baseline Score 3.8  -CA      L-Dex UE Value Change -1.8  -CA      L-Dex UE Comment The patient had SOZO measurement which I reviewed today. The score is in normal limits, see scanned to EMR. Bioimpedance spectroscopy helps identify the onset of lymphedema in an arm or leg before patients experience noticeable swelling. Research has shown that the early detection of lymphedema using L-Dex combined with treatment can reduce progression to chronic lymphedema by 95% in breast cancer patients. Whenever possible, patients are tested for baseline L-Dex score before cancer treatment begins and then are reassessed during regular follow-up visits using the SOZO device. If the patient’s L-Dex score increases above normal levels, that is a sign that lymphedema is developing and a referral is made to occupational or physical therapy for further evaluation and early compression treatment. Lymphedema assessment with the SOZO L-Dex score is recommended to be done every 3 months for the first 3 years and then every 6 months for years 4 and 5 followed by annually afterwards. This score is for 3 months after this patient’s surgical intervention, a high-risk period of time for potential lymphedema development.  -CA       Skeletal Muscle Mass (%) 21.7 %  -CA      Fat Mass (%) 39.9 %  -CA      Hy-dex -5  -CA                User Key  (r) = Recorded By, (t) = Taken By, (c) = Cosigned By      Initials Name Provider Type    Edita Colvin PT Physical Therapist                  Therapy Education  Education Details: Pt was educated on the results of this assessment compared to the initial evaluation as well as changes that present. Reinforced continued monitoring for edema. Advised on the need for long term strengthening and cardiovascular exercise during and after oncology care. Reviewed postural impact of surgery and radiation on long term function and the value of continuing with HEP provided at pre-surgical assessment  Given: Symptoms/condition management, Edema management  Program: New  How Provided: Verbal, Demonstration  Provided to: Patient  Level of Understanding: Verbalized     OP Exercises       Row Name 01/24/24 1320             Subjective    Subjective Comments Pt underwent a L lumpectomy with SLNB 10- followed by radiation to the L chest. She notes she is healing well and so pleased with her care.  -CA         Subjective Pain    Able to rate subjective pain? yes  -CA      Pre-Treatment Pain Level 0  -CA      Post-Treatment Pain Level 0  -CA         Exercise 1    Exercise Name 1 scapular retraction  -CA         Exercise 2    Exercise Name 2 chin tuck  -CA         Exercise 3    Exercise Name 3 horizontal abduction with hand behind head for pectoralis stretching  -CA                User Key  (r) = Recorded By, (t) = Taken By, (c) = Cosigned By      Initials Name Provider Type    Edita Colvin, TOM Physical Therapist                         PT OP Goals       Row Name 01/24/24 1320          Long Term Goals    LTG Date to Achieve 01/24/24  -CA     LTG 1 Pt to demonstrate an awareness of long term management of UE function for post-operative care related to breast cancer.  -CA     LTG 1 Progress Met  -CA     LTG  2 Pt to demonstrate understanding of long term edema monitoring to respond appropriately if symptoms develop.  -CA     LTG 2 Progress Met  -CA        Time Calculation    PT Goal Re-Cert Due Date 01/24/24  -CA               User Key  (r) = Recorded By, (t) = Taken By, (c) = Cosigned By      Initials Name Provider Type    CA Edita Hough, PT Physical Therapist                     PT Assessment/Plan       Row Name 01/24/24 1320          PT Assessment    Assessment Comments This patient presents to PT for assessment after her 3rd month status post BrCA surgical intervention. Post-operative ROM, postural, functional, and bioimpedance measurements were taken today to be compared to measurements at baseline before surgery. Any deficits related to these areas were addressed today with focus on early intervention to avoid post-surgical complications. Personal risk factors for lymphedema post-operatively for the L upper extremity and trunk quadrant and lymphedema risk reduction guidelines were reviewed today. Currently, this patient is able to self-manage with the information provided today. However, she may return for additional management if her functional status changes. Routine assessments for functional status is being performed to refer to the appropriate therapy if concerns present.  -CA     Please refer to paper survey for additional self-reported information Yes  -CA     Rehab Potential Good  -CA     Patient/caregiver participated in establishment of treatment plan and goals Yes  -CA     Patient would benefit from skilled therapy intervention Yes  -CA        PT Plan    PT Frequency One time visit  -CA     Planned CPT's? PT EVAL MOD COMPLELITY: 95889;PT THER PROC EA 15 MIN: 18141;PT THER ACT EA 15 MIN: 19337;PT MANUAL THERAPY EA 15 MIN: 16372;PT SELF CARE/HOME MGMT/TRAIN EA 15: 68162;PT BIS XTRACELL FLUID ANALYSIS: 69189  -CA     PT Plan Comments This patient may return to PT in 3 months for re-evaluation  measurements (6 months post operatively) to be compared to measurements taken today and at her presurgical baseline. In addition, she can be examined for possible post-BrCA surgery sequelae such as axillary web syndrome, scar adhesions, edema, worsened posture, scapular winging, pain, and reduced ROM and function. At that time, a future plan and goals will be established and skilled care continued if indicated. Currently, this has been provided with information to facilitate recovery and reduce the risk of post-operative sequelae.  -CA               User Key  (r) = Recorded By, (t) = Taken By, (c) = Cosigned By      Initials Name Provider Type    Edita Colvin PT Physical Therapist                     Outcome Measure Options: Quick DASH, Timed Up and Go (TUG)  Quick DASH  Open a tight or new jar.: No Difficulty  Do heavy household chores (e.g., wash walls, wash floors): No Difficulty  Carry a shopping bag or briefcase: No Difficulty  Wash your back: No Difficulty  Use a knife to cut food: No Difficulty  Recreational activities in which you take some force or impact through your arm, should or hand (e.g. golf, hammering, tennis, etc.): No Difficulty  During the past week, to what extent has your arm, shoulder, or hand problem interfered with your normal social activites with family, friends, neighbors or groups?: Not at all  During the past week, were you limited in your work or other regular daily activities as a result of your arm, shoulder or hand problem?: Not limited at all  Arm, Shoulder, or hand pain: None  Tingling (pins and needles) in your arm, shoulder, or hand: None  During the past week, how much difficulty have you had sleeping because of the pain in your arm, shoulder or hand?: No difficulty  Number of Questions Answered: 11  Quick DASH Score: 0         Time Calculation:   Start Time: 1320  Stop Time: 1400  Time Calculation (min): 40 min  Untimed Charges  PT Eval/Re-eval Minutes: 20  Total  Minutes  Untimed Charges Total Minutes: 20   Total Minutes: 20     Time lost as PT was seeing pt inbetween other MD appts. Total time was 20 minutes with pt.       Therapy Charges for Today       Code Description Service Date Service Provider Modifiers Qty    48548525593 HC PT BIS XTRACELL FLUID ANALYSIS 1/24/2024 Edita Hough, PT  1    41385166954 HC PT EVAL MOD COMPLEXITY 1 1/24/2024 Edita Hough, PT GP 1            PT G-Codes  Outcome Measure Options: Quick DASH, Timed Up and Go (TUG)  Quick DASH Score: 0            Edita Hough, PT  1/24/2024

## 2024-01-24 NOTE — PROGRESS NOTES
Follow Up Office Visit      Encounter Date: 01/24/2024   Patient Name: Justine Fernandes  YOB: 1955   Medical Record Number: [id number]   Primary Diagnosis: No primary diagnosis found.   Cancer Staging: Cancer Staging   No matching staging information was found for the patient.                Cancer Staging   No matching staging information was found for the patient.          Chief Complaint:    Chief Complaint   Patient presents with    Breast Cancer       Oncologic History: Justine Fernandes is a 68 y.o. female 68 y.o. female who is here for consultation regarding her left breast invasive ductal carcinoma (pT2N0, +/+/- )     She sought medical evaluation for a palpable left breast mass. She has a history of a prior left exicisional biopsy with benign pathology.  8/22/23 - Diagnostic mammogram/US -  irregular isodense mass with ill-defined and spiculated borders present in the posterior approximately 3:00 position of the left breast. Several stable IM nodes noted. Evidence of prior surgery in mid UOQ  8/22 US guided biopsy - grade 2 invasive ductal carcinoma, +/+/-   9/18 - breast MRI - known malignancy identified in left breast, no other findings in either breast concerning for malignancy. Stable IM nodes and reactive appearing axillary LN  10/10 - left lumpectomy and SLN evaluation (Dr. Janis French) - grade 2, 2.7 cm IDC with focal DCIS. Extended margins negative. 0/1 SLN involved. +/+/-. pT2N0    Completed adjuvant RT to left breast 40 .05 Gy/15 fractions, completed 12/20/23  Dermatitis improved, good ROM in L arm, normal activity level, denies noticeable swelling in L arm or hand, started taking anastrazole and tolerating it well      Prior Radiation:  left breast 40 .05 Gy/15 fractions, completed 12/20/23        Subjective      Review of Systems: Review of Systems   Constitutional:  Positive for fatigue.   Genitourinary:         Pt reports ongoing urinary  "incontinence and has an appointment w/ a Urologist to f/u.       The following portions of the patient's history were reviewed and updated as appropriate: allergies, current medications, past family history, past medical history, past social history, past surgical history and problem list.    Measures:   KPS/Quality of Life: 80 - Restricted Physical Activity      Objective     Physical Exam:   Vital Signs:   Vitals:    01/24/24 1329   BP: 117/75   Pulse: 71   Resp: 20   SpO2: 95%   Weight: 87.5 kg (193 lb)   Height: 160 cm (63\")   PainSc: 0-No pain     Body mass index is 34.19 kg/m².     Constitutional: No acute distress, sitting comfortably  Eye: EOMI, anicteric sclerae  HENT: NC/AT, MMM   Respiratory: Symmetric expansion, nonlabored respiration  MSK: ROM intact in all four extremities, no obvious deformities  Neuro: Alert, oriented x3, CN3-12 grossly intact.   Psych: Appropriate mood and affect.  L breast - no masses or regional LAD            Assessment / Plan        Assessment/Plan:     There are no diagnoses linked to this encounter.    Justine Fernandes is a pleasant 68 y.o. female with a pT2N0 +/+/- invasive ductal carcinoma of the left breast managed with a lumpectomy and SLN evaluation on 10/10/23. Her tumor was resected with negative margins. She completed post lumpectomy RT (40.05 Gy/15 fractions) in 12/2023 and is recovering well from acute radiation related toxicity. She is on anastrazole and has her first post treatment mammogram scheduled through Dr. Janis French's office 6 months from completion of radiation. I will see her back in 1 year.           Follow Up:   Return for Office Visit.        Time:   I spent 15 minutes on this encounter today, 01/24/24. Activities that took place during this time include:   - preparing to see the patient  - obtaining and reviewing separately obtained history  - performing a medically appropriate examination and evaluation  - counseling and educating the patient  - " ordering medications/tests/procedures  - communicating with other healthcare providers  - documenting clinical information in the health record  - coordinating care for this patient.     Sincerely,        Lizz Hussein MD  Radiation Oncology  This document has been signed by Lizz Hussein MD on January 24, 2024 16:16 EST           NOTICE TO PATIENTS  At [facility], we believe that sharing information builds trust and better relationships. You are receiving this note because you recently visited [facility]. It is possible you will see health information before a provider has talked with you about it. This kind of information can be easy to misunderstand. To help you fully understand what it means for your health, we urge you to discuss this note with your provider.

## 2024-01-25 ENCOUNTER — TELEPHONE (OUTPATIENT)
Dept: RADIATION ONCOLOGY | Facility: HOSPITAL | Age: 69
End: 2024-01-25
Payer: MEDICARE

## 2024-01-25 NOTE — TELEPHONE ENCOUNTER
Called patient to schedule 1 year F/U for Friday, 1/24/25 at 2:30 pm.  Patient verbalized an understanding of date, time, and location of appointment.  Provided contact information and patient will call with any questions or concerns should they arise.

## 2024-01-30 DIAGNOSIS — C50.912 MALIGNANT NEOPLASM OF LEFT BREAST IN FEMALE, ESTROGEN RECEPTOR POSITIVE, UNSPECIFIED SITE OF BREAST: Primary | ICD-10-CM

## 2024-01-30 DIAGNOSIS — Z17.0 MALIGNANT NEOPLASM OF LEFT BREAST IN FEMALE, ESTROGEN RECEPTOR POSITIVE, UNSPECIFIED SITE OF BREAST: Primary | ICD-10-CM

## 2024-03-01 ENCOUNTER — OFFICE VISIT (OUTPATIENT)
Dept: ONCOLOGY | Facility: CLINIC | Age: 69
End: 2024-03-01
Payer: MEDICARE

## 2024-03-01 VITALS
HEIGHT: 63 IN | TEMPERATURE: 97.1 F | SYSTOLIC BLOOD PRESSURE: 114 MMHG | OXYGEN SATURATION: 98 % | RESPIRATION RATE: 20 BRPM | HEART RATE: 74 BPM | BODY MASS INDEX: 33.66 KG/M2 | DIASTOLIC BLOOD PRESSURE: 82 MMHG | WEIGHT: 190 LBS

## 2024-03-01 DIAGNOSIS — Z17.0 MALIGNANT NEOPLASM OF UPPER-OUTER QUADRANT OF LEFT BREAST IN FEMALE, ESTROGEN RECEPTOR POSITIVE: Primary | ICD-10-CM

## 2024-03-01 DIAGNOSIS — C50.412 MALIGNANT NEOPLASM OF UPPER-OUTER QUADRANT OF LEFT BREAST IN FEMALE, ESTROGEN RECEPTOR POSITIVE: Primary | ICD-10-CM

## 2024-03-01 DIAGNOSIS — R92.8 ABNORMAL MAMMOGRAM: ICD-10-CM

## 2024-03-01 PROCEDURE — 99213 OFFICE O/P EST LOW 20 MIN: CPT | Performed by: NURSE PRACTITIONER

## 2024-03-01 PROCEDURE — 1159F MED LIST DOCD IN RCRD: CPT | Performed by: NURSE PRACTITIONER

## 2024-03-01 PROCEDURE — 1160F RVW MEDS BY RX/DR IN RCRD: CPT | Performed by: NURSE PRACTITIONER

## 2024-03-01 PROCEDURE — 1126F AMNT PAIN NOTED NONE PRSNT: CPT | Performed by: NURSE PRACTITIONER

## 2024-03-01 NOTE — PROGRESS NOTES
"      PROBLEM LIST:  pT2 N0 M0 ER positive (100%, 3+) MN positive (50%, 2-3+) HER2 negative (1+) invasive ductal carcinoma of the left breast  Left breast lumpectomy on 10/10/2023.  Pathology showed a 2.7 cm intermediate grade invasive ductal carcinoma.  0 of 1 sentinel lymph node involved.  Oncotype score 18.  Radiation completed 12/20/2023.  Anastrozole started 1/5/2024  Anxiety  Cirrhosis  Diabetes  Hyperlipidemia  Hypertrophic obstructive cardiomyopathy  Hypothyroidism  Osteopenia  Sleep apnea    Subjective     CHIEF COMPLAINT: breast cancer    HISTORY OF PRESENT ILLNESS:   Justine Fernandes returns for follow-up.  She completed adjuvant radiotherapy to the left breast 12/20/2023.  She began anastrozole 1/5/2024.  She has noticed some increased fatigue since starting the anastrozole.  She denies any hot flashes or night sweats.  She denies any new medical concerns today.      Objective      /82 Comment: RUE  Pulse 74   Temp 97.1 °F (36.2 °C) (Infrared)   Resp 20   Ht 160 cm (63\")   Wt 86.2 kg (190 lb)   SpO2 98% Comment: RA  BMI 33.66 kg/m²   Vitals:    03/01/24 1302   PainSc: 0-No pain               ECOG score: 0     General: well appearing female in no acute distress  Neuro: alert and oriented  HEENT: sclerae anicteric, oropharynx clear  Extremities: no lower extremity edema  Skin: no rashes, lesions, bruising, or petechiae  Psych: mood and affect appropriate          RECENT LABS:  Lab Results   Component Value Date    WBC 2.36 (L) 01/10/2024    HGB 14.1 01/10/2024    HCT 40.0 01/10/2024    MCV 89.3 01/10/2024     (L) 01/10/2024       Lab Results   Component Value Date    GLUCOSE 107 (H) 01/10/2024    BUN 11 01/10/2024    CREATININE 0.78 01/10/2024    EGFRIFNONA 72 12/04/2019    EGFRIFAFRI 96 09/28/2022    BCR 14.1 01/10/2024    K 4.7 01/10/2024    CO2 26.0 01/10/2024    CALCIUM 10.4 01/10/2024    PROTENTOTREF 7.5 01/10/2024    ALBUMIN 4.1 01/10/2024    LABIL2 1.2 01/10/2024    AST 25 " 01/10/2024    ALT 20 01/10/2024               ASSESSMENT AND PLAN:     Justine Fernandes is a 68 y.o. female with a T2N0M0 ER+ Her2 negative IDC of the left breast.     Radiation completed 12/20/2023.  Anastrozole started 1/5/2024.  She has some increased fatigue but otherwise is tolerating anastrozole well.  Will continue anastrozole for 5 years.    Bilateral diagnostic mammogram scheduled for July 1, 2024.    Osteopenia: she had a dexa scan 8/20/23.  Plan to repeat in 2 years.  Encouraged vitamin D supplement and weightbearing exercise such as walking 30 minutes 5 days a week.    Follow-up in 6 months.                    Tish Chappell APRN  [facility]    3/1/2024          CC:

## 2024-04-11 DIAGNOSIS — E11.9 TYPE 2 DIABETES MELLITUS WITHOUT COMPLICATION, WITHOUT LONG-TERM CURRENT USE OF INSULIN: ICD-10-CM

## 2024-04-11 DIAGNOSIS — Z13.220 LIPID SCREENING: ICD-10-CM

## 2024-04-11 DIAGNOSIS — E55.9 VITAMIN D DEFICIENCY: Primary | ICD-10-CM

## 2024-04-11 DIAGNOSIS — E03.9 ACQUIRED HYPOTHYROIDISM: ICD-10-CM

## 2024-04-15 ENCOUNTER — LAB (OUTPATIENT)
Dept: LAB | Facility: HOSPITAL | Age: 69
End: 2024-04-15
Payer: MEDICARE

## 2024-04-15 DIAGNOSIS — E03.9 ACQUIRED HYPOTHYROIDISM: ICD-10-CM

## 2024-04-15 DIAGNOSIS — E55.9 VITAMIN D DEFICIENCY: ICD-10-CM

## 2024-04-15 DIAGNOSIS — Z13.220 LIPID SCREENING: ICD-10-CM

## 2024-04-15 DIAGNOSIS — E11.9 TYPE 2 DIABETES MELLITUS WITHOUT COMPLICATION, WITHOUT LONG-TERM CURRENT USE OF INSULIN: ICD-10-CM

## 2024-04-16 LAB
25(OH)D3+25(OH)D2 SERPL-MCNC: 36 NG/ML (ref 30–100)
ALBUMIN SERPL-MCNC: 4 G/DL (ref 3.9–4.9)
ALBUMIN/GLOB SERPL: 1.1 {RATIO} (ref 1.2–2.2)
ALP SERPL-CCNC: 82 IU/L (ref 44–121)
ALT SERPL-CCNC: 18 IU/L (ref 0–32)
AST SERPL-CCNC: 27 IU/L (ref 0–40)
BASOPHILS # BLD AUTO: 0 X10E3/UL (ref 0–0.2)
BASOPHILS NFR BLD AUTO: 1 %
BILIRUB SERPL-MCNC: 0.9 MG/DL (ref 0–1.2)
BUN SERPL-MCNC: 13 MG/DL (ref 8–27)
BUN/CREAT SERPL: 19 (ref 12–28)
CALCIUM SERPL-MCNC: 9.7 MG/DL (ref 8.7–10.3)
CHLORIDE SERPL-SCNC: 103 MMOL/L (ref 96–106)
CHOLEST SERPL-MCNC: 177 MG/DL (ref 100–199)
CO2 SERPL-SCNC: 23 MMOL/L (ref 20–29)
CREAT SERPL-MCNC: 0.7 MG/DL (ref 0.57–1)
EGFRCR SERPLBLD CKD-EPI 2021: 95 ML/MIN/1.73
EOSINOPHIL # BLD AUTO: 0.1 X10E3/UL (ref 0–0.4)
EOSINOPHIL NFR BLD AUTO: 3 %
ERYTHROCYTE [DISTWIDTH] IN BLOOD BY AUTOMATED COUNT: 13.3 % (ref 11.7–15.4)
GLOBULIN SER CALC-MCNC: 3.5 G/DL (ref 1.5–4.5)
GLUCOSE SERPL-MCNC: 101 MG/DL (ref 70–99)
HBA1C MFR BLD: 5.9 % (ref 4.8–5.6)
HCT VFR BLD AUTO: 39.8 % (ref 34–46.6)
HDLC SERPL-MCNC: 50 MG/DL
HGB BLD-MCNC: 13.6 G/DL (ref 11.1–15.9)
IMM GRANULOCYTES # BLD AUTO: 0 X10E3/UL (ref 0–0.1)
IMM GRANULOCYTES NFR BLD AUTO: 0 %
LDLC SERPL CALC-MCNC: 95 MG/DL (ref 0–99)
LYMPHOCYTES # BLD AUTO: 0.8 X10E3/UL (ref 0.7–3.1)
LYMPHOCYTES NFR BLD AUTO: 34 %
MCH RBC QN AUTO: 30.4 PG (ref 26.6–33)
MCHC RBC AUTO-ENTMCNC: 34.2 G/DL (ref 31.5–35.7)
MCV RBC AUTO: 89 FL (ref 79–97)
MICROALBUMIN UR-MCNC: 3.8 UG/ML
MONOCYTES # BLD AUTO: 0.2 X10E3/UL (ref 0.1–0.9)
MONOCYTES NFR BLD AUTO: 8 %
NEUTROPHILS # BLD AUTO: 1.4 X10E3/UL (ref 1.4–7)
NEUTROPHILS NFR BLD AUTO: 54 %
PLATELET # BLD AUTO: 110 X10E3/UL (ref 150–450)
POTASSIUM SERPL-SCNC: 4.2 MMOL/L (ref 3.5–5.2)
PROT SERPL-MCNC: 7.5 G/DL (ref 6–8.5)
RBC # BLD AUTO: 4.47 X10E6/UL (ref 3.77–5.28)
SODIUM SERPL-SCNC: 138 MMOL/L (ref 134–144)
T3FREE SERPL-MCNC: 2.7 PG/ML (ref 2–4.4)
T4 FREE SERPL-MCNC: 1.11 NG/DL (ref 0.82–1.77)
TRIGL SERPL-MCNC: 187 MG/DL (ref 0–149)
TSH SERPL DL<=0.005 MIU/L-ACNC: 3.35 UIU/ML (ref 0.45–4.5)
VIT B12 SERPL-MCNC: 475 PG/ML (ref 232–1245)
VLDLC SERPL CALC-MCNC: 32 MG/DL (ref 5–40)
WBC # BLD AUTO: 2.5 X10E3/UL (ref 3.4–10.8)

## 2024-04-22 ENCOUNTER — OFFICE VISIT (OUTPATIENT)
Dept: INTERNAL MEDICINE | Facility: CLINIC | Age: 69
End: 2024-04-22
Payer: MEDICARE

## 2024-04-22 ENCOUNTER — LAB (OUTPATIENT)
Dept: LAB | Facility: HOSPITAL | Age: 69
End: 2024-04-22
Payer: MEDICARE

## 2024-04-22 VITALS
SYSTOLIC BLOOD PRESSURE: 110 MMHG | DIASTOLIC BLOOD PRESSURE: 74 MMHG | HEART RATE: 90 BPM | WEIGHT: 187.2 LBS | HEIGHT: 63 IN | BODY MASS INDEX: 33.17 KG/M2 | TEMPERATURE: 98.2 F

## 2024-04-22 DIAGNOSIS — I95.9 HYPOTENSION, UNSPECIFIED HYPOTENSION TYPE: ICD-10-CM

## 2024-04-22 DIAGNOSIS — E61.1 LOW IRON: ICD-10-CM

## 2024-04-22 DIAGNOSIS — E03.9 ACQUIRED HYPOTHYROIDISM: ICD-10-CM

## 2024-04-22 DIAGNOSIS — E11.9 TYPE 2 DIABETES MELLITUS WITHOUT COMPLICATION, WITHOUT LONG-TERM CURRENT USE OF INSULIN: ICD-10-CM

## 2024-04-22 DIAGNOSIS — I42.2 HYPERTROPHIC CARDIOMYOPATHY: Primary | ICD-10-CM

## 2024-04-22 DIAGNOSIS — D72.819 LEUKOPENIA, UNSPECIFIED TYPE: ICD-10-CM

## 2024-04-22 PROBLEM — G89.29 CHRONIC PAIN OF LEFT KNEE: Status: ACTIVE | Noted: 2024-04-22

## 2024-04-22 PROBLEM — M25.562 CHRONIC PAIN OF LEFT KNEE: Status: ACTIVE | Noted: 2024-04-22

## 2024-04-22 PROCEDURE — 3044F HG A1C LEVEL LT 7.0%: CPT | Performed by: PHYSICIAN ASSISTANT

## 2024-04-22 PROCEDURE — 99214 OFFICE O/P EST MOD 30 MIN: CPT | Performed by: PHYSICIAN ASSISTANT

## 2024-04-22 NOTE — PROGRESS NOTES
[facility], [place], operated by [facility]    Justine Fernandes  1955   [id number]      Patient Care Team:  Radha Cooney PA-C as PCP - General (Physician Assistant)  Magdiel Junior MD as Consulting Physician (Cardiology)  Roscoe Holley DO as Consulting Physician (Gastroenterology)  Royal Briceño MD as Consulting Physician (Hematology and Oncology)  Mable Ortega APRN as Nurse Practitioner (Family Medicine)  Lizz Hussein MD as Consulting Physician (Radiation Oncology)  Chin Carey MD as Referring Physician (General Surgery)  Marisol Hernandez MD as Consulting Physician (Hematology and Oncology)    Chief Complaint::   Chief Complaint   Patient presents with    type 2 diabetes     3 month follow-up        HPI  Justine Fernandes is a 68-year-old female date of birth 1955 who presents today for 3-month follow-up. Past medical history significant for breast cancer status post radiation, type 2 diabetes, anxiety, hypothyroidism.  Was last seen in the office in January. Has completed fasting labs, here for followup.  She walks in her home and tries to stay active.  She will walk outside, weather pending. She is fearful of falls.   She currently follows with cardiology with echo every 3 months.  She does have occasional chest pain, denies shortness of breath or palpitations-3/27 ECHO WN      Patient Active Problem List   Diagnosis    Anxiety    Type 2 diabetes mellitus without complication, without long-term current use of insulin    Congenital heart defect    Sleep apnea    Leukopenia    Hypothyroidism    Fatigue    Other cirrhosis of liver    Platelets decreased    Welcome to Medicare preventive visit    Papules    Hypertrophic cardiomyopathy    Routine medical exam    Breast pain, left    Post-menopausal    Malignant neoplasm of upper-outer quadrant of left breast in female, estrogen receptor positive    Grade III hemorrhoids    Hypotension    Chronic pain of left knee         Past Medical History:   Diagnosis Date    Anxiety     Asthma     Mild     Breast cancer 10/10/2023    Left Breast    Bundle branch block     Carpal tunnel syndrome, Right     Circulation disorder of lower extremity     legs    Cirrhosis, nonalcoholic     Colon polyps     Congenital heart defect     Diabetes mellitus     Elevated cholesterol     Hyperlipidemia     Hypertrophic obstructive cardiomyopathy     Hypothyroid     Leukopenia     Melanocytic nevus of trunk     Osteopenia     Paresthesia of hand     Routine medical exam 06/30/2023    Senile hyperkeratosis     Sensorineural hearing loss     Sleep apnea     no cpap    Steatosis of liver     Stress incontinence        Past Surgical History:   Procedure Laterality Date    BLADDER SURGERY  1973    BREAST CYST EXCISION Left 2000    excisional biopsy    BREAST LUMPECTOMY Left 10/10/2023    Procedure: BREAST LUMPECTOMY WITH NEEDLE LOCALIZATION, SENTINEL BIOPSY LEFT;  Surgeon: Chin Carey MD;  Location: [facility];  Service: General;  Laterality: Left;    CARPAL TUNNEL RELEASE  2018    right     COLONOSCOPY      KIDNEY STONE SURGERY  1994    KNEE SURGERY  2019    meniscus repair- left knee    NASAL POLYP SURGERY  2006 1999    NASAL SEPTUM SURGERY  1999    REPLACEMENT TOTAL HIP LATERAL POSITION Right 10/02/2020    TONSILLECTOMY AND ADENOIDECTOMY      TUBAL ABDOMINAL LIGATION      VAGINAL REPAIR  1976    VARICOSE VEIN SURGERY  2006    Right leg     WISDOM TOOTH EXTRACTION         Family History   Problem Relation Age of Onset    Breast cancer Mother 80    Heart disease Mother     Cancer Mother     Colon cancer Mother     Arthritis Mother     Angina Mother     Osteoporosis Mother     Hyperlipidemia Mother     Stroke Father     Colon cancer Father     Heart attack Father     Lung cancer Father     Cancer Sister     Migraines Sister     Breast cancer Sister 66    Cancer Sister     Cancer Brother     Arthritis Brother     Hypertension Brother     Cancer  "Brother     Diabetes Brother     Breast cancer Maternal Cousin 42       Social History     Socioeconomic History    Marital status:    Tobacco Use    Smoking status: Never    Smokeless tobacco: Never   Vaping Use    Vaping status: Never Used   Substance and Sexual Activity    Alcohol use: Never    Drug use: Never    Sexual activity: Defer       No Known Allergies    Review of Systems   Constitutional:  Negative for activity change, appetite change, diaphoresis, fatigue, unexpected weight gain and unexpected weight loss.   HENT:  Negative for hearing loss.    Eyes:  Negative for visual disturbance.   Respiratory:  Negative for chest tightness and shortness of breath.    Cardiovascular:  Negative for chest pain, palpitations and leg swelling.   Gastrointestinal:  Negative for abdominal pain, blood in stool, GERD and indigestion.   Endocrine: Negative for cold intolerance and heat intolerance.   Genitourinary:  Negative for dysuria and hematuria.   Musculoskeletal:  Positive for arthralgias. Negative for myalgias.   Skin:  Negative for skin lesions.   Neurological:  Negative for tremors, seizures, syncope, speech difficulty, weakness, headache, memory problem and confusion.   Hematological:  Does not bruise/bleed easily.   Psychiatric/Behavioral:  Negative for sleep disturbance and depressed mood. The patient is not nervous/anxious.         Vital Signs  Vitals:    04/22/24 1014   BP: 110/74   BP Location: Right arm   Patient Position: Sitting   Cuff Size: Adult   Pulse: 90   Temp: 98.2 °F (36.8 °C)   TempSrc: Infrared   Weight: 84.9 kg (187 lb 3.2 oz)   Height: 160 cm (62.99\")   PainSc: 0-No pain     Body mass index is 33.17 kg/m².        Advance Care Planning   ACP discussion was held with the patient during this visit. Patient does not have an advance directive, information provided.       Current Outpatient Medications:     albuterol sulfate  (90 Base) MCG/ACT inhaler, ProAir HFA 90 mcg/actuation " aerosol inhaler  Every six hours, Disp: , Rfl:     anastrozole (ARIMIDEX) 1 MG tablet, Take 1 tablet by mouth Daily., Disp: 90 tablet, Rfl: 3    escitalopram (LEXAPRO) 10 MG tablet, Take 1 tablet by mouth Every Morning., Disp: 90 tablet, Rfl: 1    Mavacamten (Camzyos) 2.5 MG capsule, , Disp: , Rfl:     Menthol-Zinc Oxide (Calmoseptine) 0.44-20.6 % ointment, Apply 1 application  topically to the appropriate area as directed 2 (Two) Times a Day., Disp: 71 g, Rfl: 3    metFORMIN (GLUCOPHAGE) 500 MG tablet, Take 1 tablet by mouth 2 (Two) Times a Day With Meals., Disp: 180 tablet, Rfl: 1    Synthroid 50 MCG tablet, Take 1 tablet by mouth Daily., Disp: 90 tablet, Rfl: 1    vitamin D3 (Vitamin D) 125 MCG (5000 UT) capsule capsule, Take 1 capsule by mouth Daily., Disp: , Rfl:     Physical Exam  Vitals and nursing note reviewed.   Constitutional:       Appearance: Normal appearance.   HENT:      Head: Normocephalic and atraumatic.      Right Ear: Tympanic membrane, ear canal and external ear normal.      Left Ear: Tympanic membrane, ear canal and external ear normal.      Nose: Nose normal.      Mouth/Throat:      Mouth: Mucous membranes are moist.      Pharynx: Oropharynx is clear.   Eyes:      Conjunctiva/sclera: Conjunctivae normal.      Pupils: Pupils are equal, round, and reactive to light.   Cardiovascular:      Rate and Rhythm: Normal rate and regular rhythm.      Pulses: Normal pulses.      Heart sounds: Normal heart sounds.   Pulmonary:      Effort: Pulmonary effort is normal.      Breath sounds: Normal breath sounds.   Abdominal:      General: Abdomen is flat. Bowel sounds are normal.      Tenderness: There is no abdominal tenderness. There is no right CVA tenderness or left CVA tenderness.   Musculoskeletal:         General: Tenderness present.      Left knee: No bony tenderness. Normal range of motion. No tenderness. Normal alignment and normal meniscus.        Legs:    Skin:     General: Skin is warm and dry.    Neurological:      General: No focal deficit present.      Mental Status: She is alert and oriented to person, place, and time.   Psychiatric:         Mood and Affect: Mood normal.         Behavior: Behavior normal.          ACE III MINI            Results Review:    Recent Results (from the past 672 hour(s))   Vitamin D,25-Hydroxy    Collection Time: 04/15/24  3:23 PM    Specimen: Blood    Blood  Release to michel   Result Value Ref Range    25 Hydroxy, Vitamin D 36.0 30.0 - 100.0 ng/mL   Vitamin B12    Collection Time: 04/15/24  3:23 PM    Specimen: Blood    Blood  Release to michel   Result Value Ref Range    Vitamin B-12 475 232 - 1,245 pg/mL   T3, Free    Collection Time: 04/15/24  3:23 PM    Specimen: Blood    Blood  Release to michel   Result Value Ref Range    T3, Free 2.7 2.0 - 4.4 pg/mL   T4, Free    Collection Time: 04/15/24  3:23 PM    Specimen: Blood    Blood  Release to michel   Result Value Ref Range    Free T4 1.11 0.82 - 1.77 ng/dL   TSH    Collection Time: 04/15/24  3:23 PM    Specimen: Blood    Blood  Release to michel   Result Value Ref Range    TSH 3.350 0.450 - 4.500 uIU/mL   MicroAlbumin, Urine, Random -    Collection Time: 04/15/24  3:23 PM    Specimen: Blood    Blood  Release to michel   Result Value Ref Range    Microalbumin, Urine 3.8 Not Estab. ug/mL   Lipid Panel    Collection Time: 04/15/24  3:23 PM    Specimen: Blood    Blood  Release to michel   Result Value Ref Range    Total Cholesterol 177 100 - 199 mg/dL    Triglycerides 187 (H) 0 - 149 mg/dL    HDL Cholesterol 50 >39 mg/dL    VLDL Cholesterol Venkata 32 5 - 40 mg/dL    LDL Chol Calc (NIH) 95 0 - 99 mg/dL   Hemoglobin A1c    Collection Time: 04/15/24  3:23 PM    Specimen: Blood    Blood  Release to michel   Result Value Ref Range    Hemoglobin A1C 5.9 (H) 4.8 - 5.6 %   Comprehensive Metabolic Panel    Collection Time: 04/15/24  3:23 PM    Specimen: Blood    Blood  Release to michel   Result Value Ref Range    Glucose 101 (H) 70 - 99 mg/dL    BUN 13 8  - 27 mg/dL    Creatinine 0.70 0.57 - 1.00 mg/dL    EGFR Result 95 >59 mL/min/1.73    BUN/Creatinine Ratio 19 12 - 28    Sodium 138 134 - 144 mmol/L    Potassium 4.2 3.5 - 5.2 mmol/L    Chloride 103 96 - 106 mmol/L    Total CO2 23 20 - 29 mmol/L    Calcium 9.7 8.7 - 10.3 mg/dL    Total Protein 7.5 6.0 - 8.5 g/dL    Albumin 4.0 3.9 - 4.9 g/dL    Globulin 3.5 1.5 - 4.5 g/dL    A/G Ratio 1.1 (L) 1.2 - 2.2    Total Bilirubin 0.9 0.0 - 1.2 mg/dL    Alkaline Phosphatase 82 44 - 121 IU/L    AST (SGOT) 27 0 - 40 IU/L    ALT (SGPT) 18 0 - 32 IU/L   CBC & Differential    Collection Time: 04/15/24  3:23 PM    Specimen: Blood    Blood  Release to [facility]   Result Value Ref Range    WBC 2.5 (L) 3.4 - 10.8 x10E3/uL    RBC 4.47 3.77 - 5.28 x10E6/uL    Hemoglobin 13.6 11.1 - 15.9 g/dL    Hematocrit 39.8 34.0 - 46.6 %    MCV 89 79 - 97 fL    MCH 30.4 26.6 - 33.0 pg    MCHC 34.2 31.5 - 35.7 g/dL    RDW 13.3 11.7 - 15.4 %    Platelets 110 (L) 150 - 450 x10E3/uL    Neutrophil Rel % 54 Not Estab. %    Lymphocyte Rel % 34 Not Estab. %    Monocyte Rel % 8 Not Estab. %    Eosinophil Rel % 3 Not Estab. %    Basophil Rel % 1 Not Estab. %    Neutrophils Absolute 1.4 1.4 - 7.0 x10E3/uL    Lymphocytes Absolute 0.8 0.7 - 3.1 x10E3/uL    Monocytes Absolute 0.2 0.1 - 0.9 x10E3/uL    Eosinophils Absolute 0.1 0.0 - 0.4 x10E3/uL    Basophils Absolute 0.0 0.0 - 0.2 x10E3/uL    Immature Granulocyte Rel % 0 Not Estab. %    Immature Grans Absolute 0.0 0.0 - 0.1 x10E3/uL     Procedures    Medication Review: Medications reviewed and noted    Social History     Socioeconomic History    Marital status:    Tobacco Use    Smoking status: Never    Smokeless tobacco: Never   Vaping Use    Vaping status: Never Used   Substance and Sexual Activity    Alcohol use: Never    Drug use: Never    Sexual activity: Defer        Assessment/Plan:    Diagnoses and all orders for this visit:    1. Hypertrophic cardiomyopathy (Primary)  Overview:  Cotinue mavacamten 2.5mg  capsules daily.  Follows cardiology closely.  She has ECHO every 3 months.      2. Hypotension, unspecified hypotension type  Overview:  Patient brings in record of very low BP in the office today. Will check iron levels today.      3. Low iron  -     Iron Profile; Future  -     Ferritin; Future    4. Type 2 diabetes mellitus without complication, without long-term current use of insulin  Overview:  Currently taking metformin 500mg daily.      5. Acquired hypothyroidism  Overview:  Currently taking levothyroxine 50mg daily. Labs today.       6. Leukopenia, unspecified type  Overview:  2022 followed up with hematology [facility] in [place]. Bone marrow biopsy was negative.      Discussed labs with patient.  A1c remains the same.  Discussed importance of regular cardiovascular exercise program to help lower blood sugars.  She will attempt to go to [employer] several days a week.  Will check iron studies today.  White blood cell counts and platelet counts remain low.  She has seen hematology in the past with subsequent bone marrow biopsy.  If iron levels low, will refer back to hematology for further evaluation.    Plan of care reviewed with patient at the conclusion of today's visit. Education was provided regarding diagnosis, management, and any prescribed or recommended OTC medications.Patient verbalizes understanding of and agreement with management plan.       I have seen Justine on this date of service. This time includes time spent by me in the following activities:preparing for the visit, reviewing tests, obtaining and/or reviewing a separately obtained history, performing a medically appropriate examination and/or evaluation , counseling and educating the patient/family/caregiver, ordering medications, tests, or procedures, referring and communicating with other health care professionals , and documenting information in the medical record    Radha Cooney PA-C      Note: Part of  this note may be an electronic transcription/translation of spoken language to printed text using the Dragon Dictation system.

## 2024-04-23 LAB
FERRITIN SERPL-MCNC: 199 NG/ML (ref 13–150)
IRON SATN MFR SERPL: 20 % (ref 20–50)
IRON SERPL-MCNC: 73 MCG/DL (ref 37–145)
TIBC SERPL-MCNC: 374 MCG/DL
UIBC SERPL-MCNC: 301 MCG/DL (ref 112–346)

## 2024-04-30 ENCOUNTER — PATIENT MESSAGE (OUTPATIENT)
Dept: ONCOLOGY | Facility: CLINIC | Age: 69
End: 2024-04-30
Payer: MEDICARE

## 2024-05-01 NOTE — TELEPHONE ENCOUNTER
From: Justine Fernandes  To: Marisol Hernandez  Sent: 4/30/2024 5:51 PM EDT  Subject: blood test results    Dr Hernandez,  I recently saw BHUMI Handley for the following: blood test results (thyroid, diabetes, cholesterol), my low blood pressure (average 75/55), pain in legs. She wanted to check my iron levels. The results show iron levels good but ferritin level high. Since my white count and platelets are low (has been for a few years) she wanted me to check with you as to whether this is normal from my cancel treatment?? I am so tired all the time and would like to get to the bottom of it. My blood pressure has always run low but has been lower in the last year or so. My heart doctor does not seem concerned about it.

## 2024-06-05 ENCOUNTER — TELEPHONE (OUTPATIENT)
Age: 69
End: 2024-06-05
Payer: MEDICARE

## 2024-06-05 NOTE — TELEPHONE ENCOUNTER
Returned patient's call regarding question about Anastrozole.  Referred patient to Dr. Hernandez, Med/Onc, who manages this med.  Patient has their contact information.

## 2024-07-01 ENCOUNTER — HOSPITAL ENCOUNTER (OUTPATIENT)
Dept: MAMMOGRAPHY | Facility: HOSPITAL | Age: 69
Discharge: HOME | End: 2024-07-01
Admitting: NURSE PRACTITIONER
Payer: MEDICARE

## 2024-07-01 DIAGNOSIS — R92.8 ABNORMAL MAMMOGRAM: ICD-10-CM

## 2024-07-01 PROCEDURE — 77066 DX MAMMO INCL CAD BI: CPT | Performed by: RADIOLOGY

## 2024-07-01 PROCEDURE — 77066 DX MAMMO INCL CAD BI: CPT

## 2024-07-01 PROCEDURE — G0279 TOMOSYNTHESIS, MAMMO: HCPCS

## 2024-07-01 PROCEDURE — G0279 TOMOSYNTHESIS, MAMMO: HCPCS | Performed by: RADIOLOGY

## 2024-07-03 ENCOUNTER — TELEPHONE (OUTPATIENT)
Dept: CARDIOLOGY | Facility: CLINIC | Age: 69
End: 2024-07-03
Payer: MEDICARE

## 2024-07-26 DIAGNOSIS — E03.9 ACQUIRED HYPOTHYROIDISM: ICD-10-CM

## 2024-07-26 RX ORDER — LEVOTHYROXINE SODIUM 50 MCG
50 TABLET ORAL DAILY
Qty: 90 TABLET | Refills: 1 | Status: SHIPPED | OUTPATIENT
Start: 2024-07-26

## 2024-07-31 ENCOUNTER — LAB (OUTPATIENT)
Dept: LAB | Facility: HOSPITAL | Age: 69
End: 2024-07-31
Payer: MEDICARE

## 2024-07-31 DIAGNOSIS — E03.9 ACQUIRED HYPOTHYROIDISM: Primary | ICD-10-CM

## 2024-07-31 DIAGNOSIS — E61.1 LOW IRON: ICD-10-CM

## 2024-07-31 DIAGNOSIS — E03.9 ACQUIRED HYPOTHYROIDISM: ICD-10-CM

## 2024-07-31 DIAGNOSIS — Z13.220 LIPID SCREENING: ICD-10-CM

## 2024-07-31 DIAGNOSIS — E11.9 TYPE 2 DIABETES MELLITUS WITHOUT COMPLICATION, WITHOUT LONG-TERM CURRENT USE OF INSULIN: ICD-10-CM

## 2024-07-31 DIAGNOSIS — E55.9 VITAMIN D DEFICIENCY: ICD-10-CM

## 2024-07-31 LAB
ALBUMIN SERPL-MCNC: 3.9 G/DL (ref 3.5–5.2)
ALBUMIN/GLOB SERPL: 1.1 G/DL
ALP SERPL-CCNC: 91 U/L (ref 39–117)
ALT SERPL W P-5'-P-CCNC: 13 U/L (ref 1–33)
ANION GAP SERPL CALCULATED.3IONS-SCNC: 10 MMOL/L (ref 5–15)
AST SERPL-CCNC: 27 U/L (ref 1–32)
BASOPHILS # BLD AUTO: 0.02 10*3/MM3 (ref 0–0.2)
BASOPHILS NFR BLD AUTO: 0.7 % (ref 0–1.5)
BILIRUB SERPL-MCNC: 1 MG/DL (ref 0–1.2)
BUN SERPL-MCNC: 10 MG/DL (ref 8–23)
BUN/CREAT SERPL: 13.9 (ref 7–25)
CALCIUM SPEC-SCNC: 9.9 MG/DL (ref 8.6–10.5)
CHLORIDE SERPL-SCNC: 104 MMOL/L (ref 98–107)
CHOLEST SERPL-MCNC: 179 MG/DL (ref 0–200)
CO2 SERPL-SCNC: 25 MMOL/L (ref 22–29)
CREAT SERPL-MCNC: 0.72 MG/DL (ref 0.57–1)
DEPRECATED RDW RBC AUTO: 46.3 FL (ref 37–54)
EGFRCR SERPLBLD CKD-EPI 2021: 91.8 ML/MIN/1.73
EOSINOPHIL # BLD AUTO: 0.1 10*3/MM3 (ref 0–0.4)
EOSINOPHIL NFR BLD AUTO: 3.6 % (ref 0.3–6.2)
ERYTHROCYTE [DISTWIDTH] IN BLOOD BY AUTOMATED COUNT: 14 % (ref 12.3–15.4)
FERRITIN SERPL-MCNC: 171 NG/ML (ref 13–150)
GLOBULIN UR ELPH-MCNC: 3.7 GM/DL
GLUCOSE SERPL-MCNC: 110 MG/DL (ref 65–99)
HBA1C MFR BLD: 5.5 % (ref 4.8–5.6)
HCT VFR BLD AUTO: 40.6 % (ref 34–46.6)
HDLC SERPL-MCNC: 49 MG/DL (ref 40–60)
HGB BLD-MCNC: 13.8 G/DL (ref 12–15.9)
IMM GRANULOCYTES # BLD AUTO: 0.01 10*3/MM3 (ref 0–0.05)
IMM GRANULOCYTES NFR BLD AUTO: 0.4 % (ref 0–0.5)
LDLC SERPL CALC-MCNC: 100 MG/DL (ref 0–100)
LDLC/HDLC SERPL: 1.95 {RATIO}
LYMPHOCYTES # BLD AUTO: 0.95 10*3/MM3 (ref 0.7–3.1)
LYMPHOCYTES NFR BLD AUTO: 34.5 % (ref 19.6–45.3)
MCH RBC QN AUTO: 30.7 PG (ref 26.6–33)
MCHC RBC AUTO-ENTMCNC: 34 G/DL (ref 31.5–35.7)
MCV RBC AUTO: 90.4 FL (ref 79–97)
MONOCYTES # BLD AUTO: 0.18 10*3/MM3 (ref 0.1–0.9)
MONOCYTES NFR BLD AUTO: 6.5 % (ref 5–12)
NEUTROPHILS NFR BLD AUTO: 1.49 10*3/MM3 (ref 1.7–7)
NEUTROPHILS NFR BLD AUTO: 54.3 % (ref 42.7–76)
NRBC BLD AUTO-RTO: 0 /100 WBC (ref 0–0.2)
PLATELET # BLD AUTO: 101 10*3/MM3 (ref 140–450)
PMV BLD AUTO: 10.3 FL (ref 6–12)
POTASSIUM SERPL-SCNC: 4.2 MMOL/L (ref 3.5–5.2)
PROT SERPL-MCNC: 7.6 G/DL (ref 6–8.5)
RBC # BLD AUTO: 4.49 10*6/MM3 (ref 3.77–5.28)
SODIUM SERPL-SCNC: 139 MMOL/L (ref 136–145)
T4 FREE SERPL-MCNC: 1.09 NG/DL (ref 0.92–1.68)
TRIGL SERPL-MCNC: 172 MG/DL (ref 0–150)
TSH SERPL DL<=0.05 MIU/L-ACNC: 3.38 UIU/ML (ref 0.27–4.2)
VLDLC SERPL-MCNC: 30 MG/DL (ref 5–40)
WBC NRBC COR # BLD AUTO: 2.75 10*3/MM3 (ref 3.4–10.8)

## 2024-07-31 PROCEDURE — 83540 ASSAY OF IRON: CPT

## 2024-07-31 PROCEDURE — 80061 LIPID PANEL: CPT

## 2024-07-31 PROCEDURE — 85025 COMPLETE CBC W/AUTO DIFF WBC: CPT

## 2024-07-31 PROCEDURE — 84439 ASSAY OF FREE THYROXINE: CPT

## 2024-07-31 PROCEDURE — 82607 VITAMIN B-12: CPT

## 2024-07-31 PROCEDURE — 84443 ASSAY THYROID STIM HORMONE: CPT

## 2024-07-31 PROCEDURE — 82306 VITAMIN D 25 HYDROXY: CPT

## 2024-07-31 PROCEDURE — 84481 FREE ASSAY (FT-3): CPT

## 2024-07-31 PROCEDURE — 36415 COLL VENOUS BLD VENIPUNCTURE: CPT

## 2024-07-31 PROCEDURE — 82043 UR ALBUMIN QUANTITATIVE: CPT

## 2024-07-31 PROCEDURE — 83036 HEMOGLOBIN GLYCOSYLATED A1C: CPT

## 2024-07-31 PROCEDURE — 80053 COMPREHEN METABOLIC PANEL: CPT

## 2024-07-31 PROCEDURE — 82728 ASSAY OF FERRITIN: CPT

## 2024-07-31 PROCEDURE — 84466 ASSAY OF TRANSFERRIN: CPT

## 2024-08-01 LAB
25(OH)D3 SERPL-MCNC: 65.6 NG/ML (ref 30–100)
ALBUMIN UR-MCNC: <1.2 MG/DL
IRON 24H UR-MRATE: 95 MCG/DL (ref 37–145)
IRON SATN MFR SERPL: 27 % (ref 20–50)
T3FREE SERPL-MCNC: 2.91 PG/ML (ref 2–4.4)
TIBC SERPL-MCNC: 347 MCG/DL (ref 298–536)
TRANSFERRIN SERPL-MCNC: 233 MG/DL (ref 200–360)
VIT B12 BLD-MCNC: 398 PG/ML (ref 211–946)

## 2024-08-06 ENCOUNTER — OFFICE VISIT (OUTPATIENT)
Dept: INTERNAL MEDICINE | Facility: CLINIC | Age: 69
End: 2024-08-06
Payer: MEDICARE

## 2024-08-06 VITALS
TEMPERATURE: 98 F | DIASTOLIC BLOOD PRESSURE: 70 MMHG | SYSTOLIC BLOOD PRESSURE: 92 MMHG | HEIGHT: 63 IN | HEART RATE: 90 BPM | WEIGHT: 183 LBS | BODY MASS INDEX: 32.43 KG/M2 | OXYGEN SATURATION: 95 %

## 2024-08-06 DIAGNOSIS — E11.9 TYPE 2 DIABETES MELLITUS WITHOUT COMPLICATION, WITHOUT LONG-TERM CURRENT USE OF INSULIN: ICD-10-CM

## 2024-08-06 DIAGNOSIS — E03.9 ACQUIRED HYPOTHYROIDISM: ICD-10-CM

## 2024-08-06 DIAGNOSIS — I42.2 HYPERTROPHIC CARDIOMYOPATHY: ICD-10-CM

## 2024-08-06 DIAGNOSIS — C50.412 MALIGNANT NEOPLASM OF UPPER-OUTER QUADRANT OF LEFT BREAST IN FEMALE, ESTROGEN RECEPTOR POSITIVE: ICD-10-CM

## 2024-08-06 DIAGNOSIS — F41.9 ANXIETY: ICD-10-CM

## 2024-08-06 DIAGNOSIS — K62.3 RECTAL PROLAPSE: ICD-10-CM

## 2024-08-06 DIAGNOSIS — E11.42 TYPE 2 DIABETES MELLITUS WITH DIABETIC POLYNEUROPATHY, WITHOUT LONG-TERM CURRENT USE OF INSULIN: ICD-10-CM

## 2024-08-06 DIAGNOSIS — D51.9 ANEMIA DUE TO VITAMIN B12 DEFICIENCY, UNSPECIFIED B12 DEFICIENCY TYPE: ICD-10-CM

## 2024-08-06 DIAGNOSIS — Z17.0 MALIGNANT NEOPLASM OF UPPER-OUTER QUADRANT OF LEFT BREAST IN FEMALE, ESTROGEN RECEPTOR POSITIVE: ICD-10-CM

## 2024-08-06 DIAGNOSIS — Z00.00 MEDICARE ANNUAL WELLNESS VISIT, SUBSEQUENT: Primary | ICD-10-CM

## 2024-08-06 DIAGNOSIS — F39 MOOD DISORDER: ICD-10-CM

## 2024-08-06 DIAGNOSIS — K74.69 OTHER CIRRHOSIS OF LIVER: ICD-10-CM

## 2024-08-06 DIAGNOSIS — D69.6 PLATELETS DECREASED: ICD-10-CM

## 2024-08-06 RX ORDER — CYANOCOBALAMIN 1000 UG/ML
1000 INJECTION, SOLUTION INTRAMUSCULAR; SUBCUTANEOUS
Status: SHIPPED | OUTPATIENT
Start: 2024-08-06 | End: 2024-10-29

## 2024-08-06 RX ORDER — ESCITALOPRAM OXALATE 10 MG/1
10 TABLET ORAL EVERY MORNING
Qty: 90 TABLET | Refills: 1 | Status: SHIPPED | OUTPATIENT
Start: 2024-08-06

## 2024-08-06 RX ORDER — ALBUTEROL SULFATE 90 UG/1
2 AEROSOL, METERED RESPIRATORY (INHALATION) EVERY 6 HOURS PRN
Qty: 18 G | Refills: 3 | Status: SHIPPED | OUTPATIENT
Start: 2024-08-06

## 2024-08-06 RX ORDER — LEVOTHYROXINE SODIUM 50 MCG
50 TABLET ORAL DAILY
Qty: 90 TABLET | Refills: 1 | Status: SHIPPED | OUTPATIENT
Start: 2024-08-06

## 2024-08-06 RX ORDER — CYANOCOBALAMIN 1000 UG/ML
1000 INJECTION, SOLUTION INTRAMUSCULAR; SUBCUTANEOUS WEEKLY
Status: SHIPPED | OUTPATIENT
Start: 2024-08-06 | End: 2024-09-03

## 2024-08-06 RX ADMIN — CYANOCOBALAMIN 1000 MCG: 1000 INJECTION, SOLUTION INTRAMUSCULAR; SUBCUTANEOUS at 15:40

## 2024-08-06 NOTE — PROGRESS NOTES
Subjective   The ABCs of the Annual Wellness Visit  Medicare Wellness Visit      Justine Fernandes is a 69 y.o. patient who presents for a Medicare Wellness Visit.    The following portions of the patient's history were reviewed and   updated as appropriate: allergies, current medications, past family history, past medical history, past social history, past surgical history, and problem list.    Compared to one year ago, the patient's physical   health is the same.  Compared to one year ago, the patient's mental   health is the same.    Recent Hospitalizations:  She was not admitted to the hospital during the last year.     Current Medical Providers:  Patient Care Team:  Radha Cooney PA-C as PCP - General (Physician Assistant)  Magdiel Junior MD as Consulting Physician (Cardiology)  Roscoe Holley DO as Consulting Physician (Gastroenterology)  Royal Briceño MD as Consulting Physician (Hematology and Oncology)  Mable Ortega APRN as Nurse Practitioner (Family Medicine)  Lizz Hussein MD as Consulting Physician (Radiation Oncology)  Chin Carey MD as Referring Physician (General Surgery)  Marisol Hernandez MD as Consulting Physician (Hematology and Oncology)    Outpatient Medications Prior to Visit   Medication Sig Dispense Refill    albuterol sulfate  (90 Base) MCG/ACT inhaler ProAir HFA 90 mcg/actuation aerosol inhaler   Every six hours      anastrozole (ARIMIDEX) 1 MG tablet Take 1 tablet by mouth Daily. 90 tablet 3    escitalopram (LEXAPRO) 10 MG tablet Take 1 tablet by mouth Every Morning. 90 tablet 1    Mavacamten (Camzyos) 2.5 MG capsule       Menthol-Zinc Oxide (Calmoseptine) 0.44-20.6 % ointment Apply 1 application  topically to the appropriate area as directed 2 (Two) Times a Day. 71 g 3    metFORMIN (GLUCOPHAGE) 500 MG tablet Take 1 tablet by mouth 2 (Two) Times a Day With Meals. 180 tablet 1    Synthroid 50 MCG tablet TAKE 1 TABLET BY MOUTH  "DAILY 90 tablet 1    vitamin D3 (Vitamin D) 125 MCG (5000 UT) capsule capsule Take 1 capsule by mouth Daily.       No facility-administered medications prior to visit.     No opioid medication identified on active medication list. I have reviewed chart for other potential  high risk medication/s and harmful drug interactions in the elderly.      Aspirin is not on active medication list.  Aspirin use is contraindicated for this patient due to: previous side effects, i.e. bruising, etc.  .    Patient Active Problem List   Diagnosis    Anxiety    Type 2 diabetes mellitus without complication, without long-term current use of insulin    Congenital heart defect    Sleep apnea    Leukopenia    Hypothyroidism    Fatigue    Other cirrhosis of liver    Platelets decreased    Welcome to Medicare preventive visit    Papules    Hypertrophic cardiomyopathy    Routine medical exam    Breast pain, left    Post-menopausal    Malignant neoplasm of upper-outer quadrant of left breast in female, estrogen receptor positive    Grade III hemorrhoids    Hypotension    Chronic pain of left knee     Advance Care Planning Advance Directive is not on file.  ACP discussion was held with the patient during this visit. Patient has an advance directive (not in EMR), copy requested.            Objective   Vitals:    08/06/24 1406   BP: 92/70   Temp: 98 °F (36.7 °C)   TempSrc: Infrared   Weight: 83 kg (183 lb)   Height: 160 cm (62.99\")   PainSc: 0-No pain       Estimated body mass index is 32.43 kg/m² as calculated from the following:    Height as of this encounter: 160 cm (62.99\").    Weight as of this encounter: 83 kg (183 lb).            Does the patient have evidence of cognitive impairment? No  Lab Results   Component Value Date    TRIG 172 (H) 07/31/2024    HDL 49 07/31/2024     07/31/2024    VLDL 30 07/31/2024    HGBA1C 5.50 07/31/2024                                                                                               Health "  Risk Assessment    Smoking Status:  Social History     Tobacco Use   Smoking Status Never   Smokeless Tobacco Never     Alcohol Consumption:  Social History     Substance and Sexual Activity   Alcohol Use Never       Fall Risk Screen  STEADI Fall Risk Assessment was completed, and patient is at LOW risk for falls.Assessment completed on:2024    Depression Screenin/6/2024     2:14 PM   PHQ-2/PHQ-9 Depression Screening   Little Interest or Pleasure in Doing Things 0-->not at all   Feeling Down, Depressed or Hopeless 0-->not at all   PHQ-9: Brief Depression Severity Measure Score 0     Health Habits and Functional and Cognitive Screenin/3/2024     3:01 PM   Functional & Cognitive Status   Do you have difficulty preparing food and eating? No   Do you have difficulty bathing yourself, getting dressed or grooming yourself? No   Do you have difficulty using the toilet? No   Do you have difficulty moving around from place to place? No   Do you have trouble with steps or getting out of a bed or a chair? No   Current Diet Well Balanced Diet   Dental Exam Up to date   Eye Exam Up to date   Exercise (times per week) 3 times per week   Current Exercises Include Swim Aerobics Class;Walking   Do you need help using the phone?  No   Are you deaf or do you have serious difficulty hearing?  No   Do you need help to go to places out of walking distance? No   Do you need help shopping? No   Do you need help preparing meals?  No   Do you need help with housework?  No   Do you need help with laundry? No   Do you need help taking your medications? No   Do you need help managing money? No   Do you ever drive or ride in a car without wearing a seat belt? No   Have you felt unusual stress, anger or loneliness in the last month? No   Who do you live with? Alone   If you need help, do you have trouble finding someone available to you? No   Have you been bothered in the last four weeks by sexual problems? No   Do you have  difficulty concentrating, remembering or making decisions? No           Age-appropriate Screening Schedule:  Refer to the list below for future screening recommendations based on patient's age, sex and/or medical conditions. Orders for these recommended tests are listed in the plan section. The patient has been provided with a written plan.    Health Maintenance List  Health Maintenance   Topic Date Due    Pneumococcal Vaccine 65+ (1 of 2 - PCV) Never done    TDAP/TD VACCINES (2 - Td or Tdap) 03/27/2019    HEPATITIS C SCREENING  Never done    DIABETIC FOOT EXAM  09/17/2020    PAP SMEAR  Never done    COVID-19 Vaccine (3 - Moderna risk series) 05/16/2021    ANNUAL WELLNESS VISIT  06/27/2024    DIABETIC EYE EXAM  07/03/2024    INFLUENZA VACCINE  08/01/2024    BMI FOLLOWUP  01/23/2025    HEMOGLOBIN A1C  01/31/2025    MAMMOGRAM  07/01/2025    LIPID PANEL  07/31/2025    URINE MICROALBUMIN  07/31/2025    DXA SCAN  08/30/2025    COLORECTAL CANCER SCREENING  10/14/2032    Hepatitis B  Completed    ZOSTER VACCINE  Completed                                                                                                                                                CMS Preventative Services Quick Reference  Risk Factors Identified During Encounter  Fall Risk-High or Moderate: Discussed Fall Prevention in the home    The above risks/problems have been discussed with the patient.  Pertinent information has been shared with the patient in the After Visit Summary.  An After Visit Summary and PPPS were made available to the patient.    Follow Up:   Next Medicare Wellness visit to be scheduled in 1 year.     Assessment & Plan  Hypertrophic cardiomyopathy        Type 2 diabetes mellitus without complication, without long-term current use of insulin    Malignant neoplasm of upper-outer quadrant of left breast in female, estrogen receptor positive    Other cirrhosis of liver    Rectal prolapse    Anemia due to vitamin B12 deficiency,  unspecified B12 deficiency type               Follow Up:   No follow-ups on file.

## 2024-08-12 PROBLEM — Z00.00 MEDICARE ANNUAL WELLNESS VISIT, SUBSEQUENT: Status: ACTIVE | Noted: 2024-08-12

## 2024-08-12 PROBLEM — E11.42 TYPE 2 DIABETES MELLITUS WITH DIABETIC POLYNEUROPATHY, WITHOUT LONG-TERM CURRENT USE OF INSULIN: Status: ACTIVE | Noted: 2022-06-17

## 2024-08-12 NOTE — PROGRESS NOTES
QUICK REFERENCE INFORMATION:  The ABCs of the Annual Wellness Visit    Annual Wellness visit    HEALTH RISK ASSESSMENT    1955    Recent History  No hospitalization(s) within the last year..        Current Medical Providers:  Patient Care Team:  Radha Cooney PA-C as PCP - General (Physician Assistant)  Magdiel Junior MD as Consulting Physician (Cardiology)  Roscoe Holley DO as Consulting Physician (Gastroenterology)  Royal Briceño MD as Consulting Physician (Hematology and Oncology)  Mable Ortega APRN as Nurse Practitioner (Family Medicine)  Lizz Hussein MD as Consulting Physician (Radiation Oncology)  Chin Carey MD as Referring Physician (General Surgery)  Marisol Hernandez MD as Consulting Physician (Hematology and Oncology)        Smoking Status:  Social History     Tobacco Use   Smoking Status Never   Smokeless Tobacco Never       Alcohol Consumption:  Social History     Substance and Sexual Activity   Alcohol Use Never       Depression Screen:       8/6/2024     2:14 PM   PHQ-2/PHQ-9 Depression Screening   Little Interest or Pleasure in Doing Things 0-->not at all   Feeling Down, Depressed or Hopeless 0-->not at all   PHQ-9: Brief Depression Severity Measure Score 0       Health Habits:              Recent Lab Results:  CMP:  Lab Results   Component Value Date    BUN 10 07/31/2024    CREATININE 0.72 07/31/2024    EGFRIFNONA 72 12/04/2019    EGFRIFAFRI 96 09/28/2022    BCR 13.9 07/31/2024     07/31/2024    K 4.2 07/31/2024    CO2 25.0 07/31/2024    CALCIUM 9.9 07/31/2024    PROTENTOTREF 7.5 04/15/2024    ALBUMIN 3.9 07/31/2024    LABGLOBREF 3.5 04/15/2024    LABIL2 1.1 (L) 04/15/2024    BILITOT 1.0 07/31/2024    ALKPHOS 91 07/31/2024    AST 27 07/31/2024    ALT 13 07/31/2024     Lipid Panel:  Lab Results   Component Value Date    CHOL 179 07/31/2024    TRIG 172 (H) 07/31/2024    HDL 49 07/31/2024    VLDL 30 07/31/2024    LDLHDL 1.95 07/31/2024      HbA1c:  Lab Results   Component Value Date    HGBA1C 5.50 07/31/2024           Age-appropriate Screening Schedule:  Refer to the list below for future screening recommendations based on patient's age, sex and/or medical conditions. Orders for these recommended tests are listed in the plan section. The patient has been provided with a written plan.    Health Maintenance   Topic Date Due    HEPATITIS C SCREENING  08/12/2024 (Originally 9/17/2020)    PAP SMEAR  10/04/2024 (Originally 9/17/2020)    COVID-19 Vaccine (3 - Moderna risk series) 10/26/2024 (Originally 5/16/2021)    INFLUENZA VACCINE  03/31/2025 (Originally 8/1/2024)    Pneumococcal Vaccine 65+ (1 of 2 - PCV) 08/06/2025 (Originally 5/12/1961)    TDAP/TD VACCINES (2 - Td or Tdap) 08/12/2025 (Originally 3/27/2019)    BMI FOLLOWUP  01/23/2025    HEMOGLOBIN A1C  01/31/2025    MAMMOGRAM  07/01/2025    DIABETIC EYE EXAM  07/05/2025    LIPID PANEL  07/31/2025    URINE MICROALBUMIN  07/31/2025    ANNUAL WELLNESS VISIT  08/06/2025    DIABETIC FOOT EXAM  08/06/2025    DXA SCAN  08/30/2025    COLORECTAL CANCER SCREENING  10/14/2032    Hepatitis B  Completed    ZOSTER VACCINE  Completed        Subjective   History of Present Illness    Justine Fernandes is a 69 y.o. female who presents for an Annual Wellness Visit.    The following portions of the patient's history were reviewed and updated as appropriate: allergies, current medications, past family history, past medical history, past social history, past surgical history, and problem list.    Outpatient Medications Prior to Visit   Medication Sig Dispense Refill    anastrozole (ARIMIDEX) 1 MG tablet Take 1 tablet by mouth Daily. 90 tablet 3    Mavacamten (Camzyos) 2.5 MG capsule       Menthol-Zinc Oxide (Calmoseptine) 0.44-20.6 % ointment Apply 1 application  topically to the appropriate area as directed 2 (Two) Times a Day. 71 g 3    vitamin D3 (Vitamin D) 125 MCG (5000 UT) capsule capsule Take 1 capsule by mouth  Daily.      albuterol sulfate  (90 Base) MCG/ACT inhaler ProAir HFA 90 mcg/actuation aerosol inhaler   Every six hours      escitalopram (LEXAPRO) 10 MG tablet Take 1 tablet by mouth Every Morning. 90 tablet 1    metFORMIN (GLUCOPHAGE) 500 MG tablet Take 1 tablet by mouth 2 (Two) Times a Day With Meals. 180 tablet 1    Synthroid 50 MCG tablet TAKE 1 TABLET BY MOUTH DAILY 90 tablet 1     No facility-administered medications prior to visit.       Patient Active Problem List   Diagnosis    Anxiety    Type 2 diabetes mellitus with diabetic polyneuropathy, without long-term current use of insulin    Congenital heart defect    Sleep apnea    Leukopenia    Hypothyroidism    Fatigue    Other cirrhosis of liver    Platelets decreased    Welcome to Medicare preventive visit    Papules    Hypertrophic cardiomyopathy    Routine medical exam    Breast pain, left    Post-menopausal    Malignant neoplasm of upper-outer quadrant of left breast in female, estrogen receptor positive    Grade III hemorrhoids    Hypotension    Chronic pain of left knee    Rectal prolapse    Medicare annual wellness visit, subsequent       Advance Care Planning:  ACP discussion was held with the patient during this visit. Patient does not have an advance directive, information provided.    Identification of Risk Factors:  Risk factors include: Cardiovascular risk.    Review of Systems   Constitutional:  Negative for chills, fatigue and fever.   HENT:  Negative for congestion, ear pain and sinus pressure.    Respiratory:  Negative for cough, chest tightness, shortness of breath and wheezing.    Cardiovascular:  Positive for palpitations. Negative for chest pain.   Gastrointestinal:  Negative for abdominal pain, blood in stool and constipation.   Endocrine: Negative for cold intolerance and heat intolerance.   Skin:  Negative for color change.   Allergic/Immunologic: Negative for environmental allergies.   Neurological:  Negative for dizziness,  speech difficulty and headaches.   Psychiatric/Behavioral:  Negative for confusion. The patient is nervous/anxious.        Compared to one year ago, the patient feels her physical health is the same.  Compared to one year ago, the patient feels her mental health is the same.    Objective     Physical Exam  Vitals reviewed.   Constitutional:       Appearance: Normal appearance. She is well-developed.   HENT:      Head: Normocephalic and atraumatic.      Right Ear: Hearing, tympanic membrane, ear canal and external ear normal.      Left Ear: Hearing, tympanic membrane, ear canal and external ear normal.      Nose: Nose normal.      Mouth/Throat:      Mouth: Mucous membranes are moist.      Pharynx: Oropharynx is clear. Uvula midline.   Eyes:      General: Lids are normal.      Conjunctiva/sclera: Conjunctivae normal.      Pupils: Pupils are equal, round, and reactive to light.   Cardiovascular:      Rate and Rhythm: Normal rate and regular rhythm.      Pulses: Normal pulses.      Heart sounds: Normal heart sounds.   Pulmonary:      Effort: Pulmonary effort is normal.      Breath sounds: Normal breath sounds. No wheezing.   Abdominal:      General: Bowel sounds are normal.      Palpations: Abdomen is soft.   Musculoskeletal:         General: Normal range of motion.      Cervical back: Full passive range of motion without pain, normal range of motion and neck supple.   Feet:      Right foot:      Protective Sensation: 10 sites tested.  10 sites sensed.      Left foot:      Protective Sensation: 10 sites tested.  10 sites sensed.   Skin:     General: Skin is warm and dry.   Neurological:      General: No focal deficit present.      Mental Status: She is alert and oriented to person, place, and time. Mental status is at baseline.      Deep Tendon Reflexes: Reflexes are normal and symmetric.   Psychiatric:         Speech: Speech normal.         Behavior: Behavior normal.         Thought Content: Thought content normal.     "     Judgment: Judgment normal.         Vitals:    08/06/24 1406   BP: 92/70   BP Location: Right arm   Patient Position: Sitting   Cuff Size: Adult   Pulse: 90   Temp: 98 °F (36.7 °C)   TempSrc: Infrared   SpO2: 95%   Weight: 83 kg (183 lb)   Height: 160 cm (62.99\")   PainSc: 0-No pain       ECG 12 Lead    Date/Time: 8/12/2024 5:54 AM  Performed by: Radha Cooney PA-C    Authorized by: Radha Cooney PA-C  Comparison: compared with previous ECG   Similar to previous ECG  Conduction: left anterior fascicular block    Clinical impression: abnormal EKG  Comments: Left anterior fasicular block sinus rhythm of 83bpm.               CMP:  Lab Results   Component Value Date    BUN 10 07/31/2024    CREATININE 0.72 07/31/2024    EGFRIFNONA 72 12/04/2019    EGFRIFAFRI 96 09/28/2022    BCR 13.9 07/31/2024     07/31/2024    K 4.2 07/31/2024    CO2 25.0 07/31/2024    CALCIUM 9.9 07/31/2024    PROTENTOTREF 7.5 04/15/2024    ALBUMIN 3.9 07/31/2024    LABGLOBREF 3.5 04/15/2024    LABIL2 1.1 (L) 04/15/2024    BILITOT 1.0 07/31/2024    ALKPHOS 91 07/31/2024    AST 27 07/31/2024    ALT 13 07/31/2024     HbA1c:  Lab Results   Component Value Date    HGBA1C 5.50 07/31/2024    HGBA1C 5.9 (H) 04/15/2024     Microalbumin:  Lab Results   Component Value Date    MICROALBUR <1.2 07/31/2024     Lipid Panel  Lab Results   Component Value Date    CHOL 179 07/31/2024    TRIG 172 (H) 07/31/2024    HDL 49 07/31/2024     07/31/2024    AST 27 07/31/2024    ALT 13 07/31/2024       Assessment & Plan   Patient Self-Management and Personalized Health Advice  The patient has been provided with information about: diet, exercise, and weight management and preventive services including:   Annual Wellness Visit (AWV).    Diagnoses and all orders for this visit:    1. Medicare annual wellness visit, subsequent (Primary)  Overview:  She is current on immunizations.  EKG reviewed with patient.  Fasting labs reviewed with " patient.  She declines any falls.      2. Hypertrophic cardiomyopathy  Overview:  Ju costaamten 2.5mg capsules daily.  Follows cardiology closely.  She has ECHO every 3 months.  Provider twice a year.     Assessment & Plan:          Orders:  -     ECG 12 Lead    3. Type 2 diabetes mellitus without complication, without long-term current use of insulin  Overview:  Currently taking metformin 500mg daily.  Sees podiatry every 3 months.    Assessment & Plan:      Orders:  -     metFORMIN (GLUCOPHAGE) 500 MG tablet; Take 1 tablet by mouth 2 (Two) Times a Day With Meals.  Dispense: 180 tablet; Refill: 1    4. Malignant neoplasm of upper-outer quadrant of left breast in female, estrogen receptor positive  Overview:  pT2 N0 M0 ER positive (100%, 3+) KS positive (50%, 2-3+) HER2 negative (1+) invasive ductal carcinoma of the left breast  Left breast lumpectomy on 10/10/2023.  Pathology showed a 2.7 cm intermediate grade invasive ductal carcinoma.  0 of 1 sentinel lymph node involved.  Oncotype score 18.  She has completed 15 radiation treatments.  Now on anastrozole 1 mg tablets daily.  Sees oncology September Dr. NEIL in one year.      5. Other cirrhosis of liver  Overview:  History of elevated Ast, ALT, and bilirubin. Follows with Dr. Holley. Follows every 6 months.  Follows with labs and ultrasound.      6. Rectal prolapse  Overview:  She sees Jackie Gamboa.       7. Anemia due to vitamin B12 deficiency, unspecified B12 deficiency type  -     cyanocobalamin injection 1,000 mcg  -     cyanocobalamin injection 1,000 mcg    8. Acquired hypothyroidism  Overview:  Currently taking levothyroxine 50mg daily. Labs today.     Orders:  -     Synthroid 50 MCG tablet; Take 1 tablet by mouth Daily.  Dispense: 90 tablet; Refill: 1    9. Anxiety  Overview:  Currently taking Lexapro 10mg daily which controls this well.    Orders:  -     escitalopram (LEXAPRO) 10 MG tablet; Take 1 tablet by mouth Every Morning.  Dispense: 90 tablet;  Refill: 1    10. Platelets decreased    11. Mood disorder    12. Type 2 diabetes mellitus with diabetic polyneuropathy, without long-term current use of insulin  Overview:  Currently taking metformin 500mg daily.  Sees podiatry every 3 months.    Assessment & Plan:        Other orders  -     albuterol sulfate  (90 Base) MCG/ACT inhaler; Inhale 2 puffs Every 6 (Six) Hours As Needed for Wheezing.  Dispense: 18 g; Refill: 3          There are no Patient Instructions on file for this visit.    Outpatient Encounter Medications as of 8/6/2024   Medication Sig Dispense Refill    albuterol sulfate  (90 Base) MCG/ACT inhaler Inhale 2 puffs Every 6 (Six) Hours As Needed for Wheezing. 18 g 3    anastrozole (ARIMIDEX) 1 MG tablet Take 1 tablet by mouth Daily. 90 tablet 3    escitalopram (LEXAPRO) 10 MG tablet Take 1 tablet by mouth Every Morning. 90 tablet 1    Mavacamten (Camzyos) 2.5 MG capsule       Menthol-Zinc Oxide (Calmoseptine) 0.44-20.6 % ointment Apply 1 application  topically to the appropriate area as directed 2 (Two) Times a Day. 71 g 3    metFORMIN (GLUCOPHAGE) 500 MG tablet Take 1 tablet by mouth 2 (Two) Times a Day With Meals. 180 tablet 1    Synthroid 50 MCG tablet Take 1 tablet by mouth Daily. 90 tablet 1    vitamin D3 (Vitamin D) 125 MCG (5000 UT) capsule capsule Take 1 capsule by mouth Daily.      [DISCONTINUED] albuterol sulfate  (90 Base) MCG/ACT inhaler ProAir HFA 90 mcg/actuation aerosol inhaler   Every six hours      [DISCONTINUED] escitalopram (LEXAPRO) 10 MG tablet Take 1 tablet by mouth Every Morning. 90 tablet 1    [DISCONTINUED] metFORMIN (GLUCOPHAGE) 500 MG tablet Take 1 tablet by mouth 2 (Two) Times a Day With Meals. 180 tablet 1    [DISCONTINUED] Synthroid 50 MCG tablet TAKE 1 TABLET BY MOUTH DAILY 90 tablet 1     Facility-Administered Encounter Medications as of 8/6/2024   Medication Dose Route Frequency Provider Last Rate Last Admin    cyanocobalamin injection 1,000 mcg   1,000 mcg Intramuscular Q28 Days Radha Cooney PA-C        cyanocobalamin injection 1,000 mcg  1,000 mcg Intramuscular Weekly Radha Cooney PA-C   1,000 mcg at 08/06/24 1540       Reviewed use of high risk medication in the elderly: yes  Reviewed for potential of harmful drug interactions in the elderly: yes    Follow Up:  Return in about 6 months (around 2/6/2025).     An After Visit Summary and PPPS with all of these plans were given to the patient.           I spent 46 minutes caring for Justine on this date of service. This time includes time spent by me in the following activities:preparing for the visit, reviewing tests, obtaining and/or reviewing a separately obtained history, performing a medically appropriate examination and/or evaluation , counseling and educating the patient/family/caregiver, ordering medications, tests, or procedures, referring and communicating with other health care professionals , and documenting information in the medical record    Radha Cooney PA-C    Note: Part of this note may be an electronic transcription/translation of spoken language to printed text using the Dragon Dictation System.

## 2024-08-12 NOTE — PATIENT INSTRUCTIONS
Medicare Wellness  Personal Prevention Plan of Service     Date of Office Visit:    Encounter Provider:  Radha Cooney PA-C  Place of Service:  [facility] INTERNAL MEDICINE  Patient Name: Justine Fernandes  :  1955    As part of the Medicare Wellness portion of your visit today, we are providing you with this personalized preventive plan of services (PPPS). This plan is based upon recommendations of the [place] Preventive Services Task Force (USPSTF) and the Advisory Committee on Immunization Practices (ACIP).    This lists the preventive care services that should be considered, and provides dates of when you are due. Items listed as completed are up-to-date and do not require any further intervention.    Health Maintenance   Topic Date Due    HEPATITIS C SCREENING  2024 (Originally 2020)    PAP SMEAR  10/04/2024 (Originally 2020)    COVID-19 Vaccine (3 - Moderna risk series) 10/26/2024 (Originally 2021)    INFLUENZA VACCINE  2025 (Originally 2024)    Pneumococcal Vaccine 65+ (1 of 2 - PCV) 2025 (Originally 1961)    TDAP/TD VACCINES (2 - Td or Tdap) 2025 (Originally 3/27/2019)    BMI FOLLOWUP  2025    HEMOGLOBIN A1C  2025    MAMMOGRAM  2025    DIABETIC EYE EXAM  2025    LIPID PANEL  2025    URINE MICROALBUMIN  2025    ANNUAL WELLNESS VISIT  2025    DIABETIC FOOT EXAM  2025    DXA SCAN  2025    COLORECTAL CANCER SCREENING  10/14/2032    Hepatitis B  Completed    ZOSTER VACCINE  Completed       Orders Placed This Encounter   Procedures    ECG 12 Lead     This order was created via procedure documentation     Order Specific Question:   Release to patient     Answer:   Routine Release [[id number]]       Return in about 6 months (around 2025).        BMI for Adults  Body mass index (BMI) is a number found using a person's weight and height. BMI can help tell how much of a  "person's weight is made up of fat. BMI does not measure body fat directly. It is used instead of tests that directly measure body fat, which can be difficult and expensive.  What are BMI measurements used for?  BMI is useful to:  Find out if your weight puts you at higher risk for medical problems.  Help recommend changes, such as in diet and exercise. This can help you reach a healthy weight. BMI screening can be done again to see if these changes are working.  How is BMI calculated?  Your height and weight are measured. The BMI is found from those numbers. This can be done with U.[place] or metric measurements. Note that charts and online BMI calculators are available to help you find your BMI quickly and easily without doing these calculations.  To calculate your BMI in [place] measurements:  Measure your weight in pounds (lb).  Multiply the number of pounds by 703.  So, for an adult who weighs 150 lb, multiply that number by 703: 150 x 703, which equals 105,450.  Measure your height in inches. Then multiply that number by itself to get a measurement called \"inches squared.\"  So, for an adult who is 70 inches tall, the \"inches squared\" measurement is 70 inches x 70 inches, which equals 4,900 inches squared.  Divide the total from step 2 (number of lb x 703) by the total from step 3 (inches squared): 105,450 ÷ 4,900 = 21.5. This is your BMI.  To calculate your BMI in metric measurements:    Measure your weight in kilograms (kg).  For this example, the weight is 70 kg.  Measure your height in meters (m). Then multiply that number by itself to get a measurement called \"meters squared.\"  So, for an adult who is 1.75 m tall, the \"meters squared\" measurement is 1.75 m x 1.75 m, which equals 3.1 meters squared.  Divide the number of kilograms (your weight) by the meters squared number. In this example: 70 ÷ 3.1 = 22.6. This is your BMI.  What do the results mean?  BMI charts are used to see if you are underweight, normal " weight, overweight, or obese. The following guidelines will be used:  Underweight: BMI less than 18.5.  Normal weight: BMI between 18.5 and 24.9.  Overweight: BMI between 25 and 29.9.  Obese: BMI of 30 or above.  BMI is a tool and cannot diagnose a condition. Talk with your health care provider about what your BMI means for you. Keep these notes in mind:  Weight includes fat and muscle. Someone with a muscular build, such as an athlete, may have a BMI that is higher than 24.9. In cases like these, BMI is not a correct measure of body fat.  If you have a BMI of 25 or higher, your provider may need to do more testing to find out if excess body fat is the cause.  BMI is measured the same way for males and females. Females usually have more body fat than males of the same height and weight.  Where to find more information  For more information about BMI, including tools to quickly find your BMI, go to:  Centers for Disease Control and Prevention: cdc.gov  American Heart Association: heart.org  National Heart, Lung, and Blood [place]: nhlbi.nih.gov  This information is not intended to replace advice given to you by your health care provider. Make sure you discuss any questions you have with your health care provider.  Document Revised: 09/07/2023 Document Reviewed: 08/31/2023  [employer] Patient Education © 2024 [employer]  Advance Directive    Advance directives are legal documents that allow you to make decisions about your health care and medical treatment in case you become unable to communicate for yourself. Advance directives let your wishes be known to family, friends, and health care providers.  Discussing and writing advance directives should happen over time rather than all at once. Advance directives can be changed and updated at any time. There are different types of advance directives, such as:  Medical power of .  Living will.  Do not resuscitate (DNR) order or do not attempt resuscitation (DNAR)  order.  Health care proxy and medical power of   A health care proxy is also called a health care agent. This person is appointed to make medical decisions for you when you are unable to make decisions for yourself. Generally, people ask a trusted friend or family member to act as their proxy and represent their preferences. Make sure you have an agreement with your trusted person to act as your proxy. A proxy may have to make a medical decision on your behalf if your wishes are not known.  A medical power of , also called a durable power of  for health care, is a legal document that names your health care proxy. Depending on the laws in your state, the document may need to be:  Signed.  Notarized.  Dated.  Copied.  Witnessed.  Incorporated into your medical record.  You may also want to appoint a trusted person to manage your money in the event you are unable to do so. This is called a durable power of  for finances. It is a separate legal document from the durable power of  for health care. You may choose your health care proxy or someone different to act as your agent in money matters.  If you do not appoint a proxy, or there is a concern that the proxy is not acting in your best interest, a court may appoint a guardian to act on your behalf.  Living will  A living will is a set of instructions that state your wishes about medical care when you cannot express them yourself. Health care providers should keep a copy of your living will in your medical record. You may want to give a copy to family members or friends. To alert caregivers in case of an emergency, you can place a card in your wallet to let them know that you have a living will and where they can find it. A living will is used if you become:  Terminally ill.  Disabled.  Unable to communicate or make decisions.  The following decisions should be included in your living will:  To use or not to use life support  equipment, such as dialysis machines and breathing machines (ventilators).  Whether you want a DNR or DNAR order. This tells health care providers not to use cardiopulmonary resuscitation (CPR) if breathing or heartbeat stops.  To use or not to use tube feeding.  To be given or not to be given food and fluids.  Whether you want comfort (palliative) care when the goal becomes comfort rather than a cure.  Whether you want to donate your organs and tissues.  A living will does not give instructions for distributing your money and property if you should pass away.  DNR or DNAR  A DNR or DNAR order is a request not to have CPR in the event that your heart stops beating or you stop breathing. If a DNR or DNAR order has not been made and shared, a health care provider will try to help any patient whose heart has stopped or who has stopped breathing. If you plan to have surgery, talk with your health care provider about how your DNR or DNAR order will be followed if problems occur.  What if I do not have an advance directive?  Some states assign family decision makers to act on your behalf if you do not have an advance directive. Each state has its own laws about advance directives. You may want to check with your health care provider, , or state representative about the laws in your state.  Summary  Advance directives are legal documents that allow you to make decisions about your health care and medical treatment in case you become unable to communicate for yourself.  The process of discussing and writing advance directives should happen over time. You can change and update advance directives at any time.  Advance directives may include a medical power of , a living will, and a DNR or DNAR order.  This information is not intended to replace advice given to you by your health care provider. Make sure you discuss any questions you have with your health care provider.  Document Revised: 09/21/2021 Document  Reviewed: 09/21/2021  [employer] Patient Education © 2024 [employer]  Fall Prevention in the Home, Adult  Falls can cause injuries and can happen to people of all ages. There are many things you can do to make your home safer and to help prevent falls.  What actions can I take to prevent falls?  General information  Use good lighting in all rooms. Make sure to:  Replace any light bulbs that burn out.  Turn on the lights in dark areas and use night-lights.  Keep items that you use often in easy-to-reach places. Lower the shelves around your home if needed.  Move furniture so that there are clear paths around it.  Do not use throw rugs or other things on the floor that can make you trip.  If any of your floors are uneven, fix them.  Add color or contrast paint or tape to clearly roslyn and help you see:  Grab bars or handrails.  First and last steps of staircases.  Where the edge of each step is.  If you use a ladder or stepladder:  Make sure that it is fully opened. Do not climb a closed ladder.  Make sure the sides of the ladder are locked in place.  Have someone hold the ladder while you use it.  Know where your pets are as you move through your home.  What can I do in the bathroom?         Keep the floor dry. Clean up any water on the floor right away.  Remove soap buildup in the bathtub or shower. Buildup makes bathtubs and showers slippery.  Use non-skid mats or decals on the floor of the bathtub or shower.  Attach bath mats securely with double-sided, non-slip rug tape.  If you need to sit down in the shower, use a non-slip stool.  Install grab bars by the toilet and in the bathtub and shower. Do not use towel bars as grab bars.  What can I do in the bedroom?  Make sure that you have a light by your bed that is easy to reach.  Do not use any sheets or blankets on your bed that hang to the floor.  Have a firm chair or bench with side arms that you can use for support when you get dressed.  What can I do in the  kitchen?  Clean up any spills right away.  If you need to reach something above you, use a step stool with a grab bar.  Keep electrical cords out of the way.  Do not use floor polish or wax that makes floors slippery.  What can I do with my stairs?  Do not leave anything on the stairs.  Make sure that you have a light switch at the top and the bottom of the stairs.  Make sure that there are handrails on both sides of the stairs. Fix handrails that are broken or loose.  Install non-slip stair treads on all your stairs if they do not have carpet.  Avoid having throw rugs at the top or bottom of the stairs.  Choose a carpet that does not hide the edge of the steps on the stairs. Make sure that the carpet is firmly attached to the stairs. Fix carpet that is loose or worn.  What can I do on the outside of my home?  Use bright outdoor lighting.  Fix the edges of walkways and driveways and fix any cracks. Clear paths of anything that can make you trip, such as tools or rocks.  Add color or contrast paint or tape to clearly roslyn and help you see anything that might make you trip as you walk through a door, such as a raised step or threshold.  Trim any bushes or trees on paths to your home.  Check to see if handrails are loose or broken and that both sides of all steps have handrails. Install guardrails along the edges of any raised decks and porches.  Have leaves, snow, or ice cleared regularly. Use sand, salt, or ice melter on paths if you live where there is ice and snow during the winter.  Clean up any spills in your garage right away. This includes grease or oil spills.  What other actions can I take?  Review your medicines with your doctor. Some medicines can cause dizziness or changes in blood pressure, which increase your risk of falling.  Wear shoes that:  Have a low heel. Do not wear high heels.  Have rubber bottoms and are closed at the toe.  Feel good on your feet and fit well.  Use tools that help you move  around if needed. These include:  Canes.  Walkers.  Scooters.  Crutches.  Ask your doctor what else you can do to help prevent falls. This may include seeing a physical therapist to learn to do exercises to move better and get stronger.  Where to find more information  Centers for Disease Control and [place]: cdc.gov  National [place] on Aging: silver.nih.gov  National [place] on Aging: silver.nih.gov  Contact a doctor if:  You are afraid of falling at home.  You feel weak, drowsy, or dizzy at home.  You fall at home.  Get help right away if you:  Lose consciousness or have trouble moving after a fall.  Have a fall that causes a head injury.  These symptoms may be an emergency. Get help right away. Call 911.  Do not wait to see if the symptoms will go away.  Do not drive yourself to the hospital.  This information is not intended to replace advice given to you by your health care provider. Make sure you discuss any questions you have with your health care provider.  Document Revised: 08/21/2023 Document Reviewed: 08/21/2023  [employer] Patient Education © 2024 [employer]

## 2024-08-14 ENCOUNTER — CLINICAL SUPPORT (OUTPATIENT)
Dept: INTERNAL MEDICINE | Facility: CLINIC | Age: 69
End: 2024-08-14
Payer: MEDICARE

## 2024-08-14 PROCEDURE — 96372 THER/PROPH/DIAG INJ SC/IM: CPT | Performed by: PHYSICIAN ASSISTANT

## 2024-08-14 RX ADMIN — CYANOCOBALAMIN 1000 MCG: 1000 INJECTION, SOLUTION INTRAMUSCULAR; SUBCUTANEOUS at 14:32

## 2024-08-21 ENCOUNTER — CLINICAL SUPPORT (OUTPATIENT)
Dept: INTERNAL MEDICINE | Facility: CLINIC | Age: 69
End: 2024-08-21
Payer: MEDICARE

## 2024-08-21 DIAGNOSIS — E53.8 B12 DEFICIENCY: Primary | ICD-10-CM

## 2024-08-21 PROCEDURE — 96372 THER/PROPH/DIAG INJ SC/IM: CPT | Performed by: PHYSICIAN ASSISTANT

## 2024-08-21 RX ADMIN — CYANOCOBALAMIN 1000 MCG: 1000 INJECTION, SOLUTION INTRAMUSCULAR; SUBCUTANEOUS at 13:27

## 2024-08-28 ENCOUNTER — CLINICAL SUPPORT (OUTPATIENT)
Dept: INTERNAL MEDICINE | Facility: CLINIC | Age: 69
End: 2024-08-28
Payer: MEDICARE

## 2024-08-28 DIAGNOSIS — D51.9 ANEMIA DUE TO VITAMIN B12 DEFICIENCY, UNSPECIFIED B12 DEFICIENCY TYPE: Primary | ICD-10-CM

## 2024-08-28 PROCEDURE — 96372 THER/PROPH/DIAG INJ SC/IM: CPT | Performed by: PHYSICIAN ASSISTANT

## 2024-08-28 RX ADMIN — CYANOCOBALAMIN 1000 MCG: 1000 INJECTION, SOLUTION INTRAMUSCULAR; SUBCUTANEOUS at 11:42

## 2024-09-05 ENCOUNTER — OFFICE VISIT (OUTPATIENT)
Dept: ONCOLOGY | Facility: CLINIC | Age: 69
End: 2024-09-05
Payer: MEDICARE

## 2024-09-05 VITALS
RESPIRATION RATE: 18 BRPM | DIASTOLIC BLOOD PRESSURE: 66 MMHG | HEART RATE: 85 BPM | OXYGEN SATURATION: 93 % | HEIGHT: 63 IN | SYSTOLIC BLOOD PRESSURE: 97 MMHG | BODY MASS INDEX: 32.07 KG/M2 | WEIGHT: 181 LBS

## 2024-09-05 DIAGNOSIS — Z17.0 MALIGNANT NEOPLASM OF UPPER-OUTER QUADRANT OF LEFT BREAST IN FEMALE, ESTROGEN RECEPTOR POSITIVE: Primary | ICD-10-CM

## 2024-09-05 DIAGNOSIS — C50.412 MALIGNANT NEOPLASM OF UPPER-OUTER QUADRANT OF LEFT BREAST IN FEMALE, ESTROGEN RECEPTOR POSITIVE: Primary | ICD-10-CM

## 2024-09-05 PROCEDURE — 1126F AMNT PAIN NOTED NONE PRSNT: CPT | Performed by: INTERNAL MEDICINE

## 2024-09-05 PROCEDURE — 99214 OFFICE O/P EST MOD 30 MIN: CPT | Performed by: INTERNAL MEDICINE

## 2024-09-05 RX ORDER — FLUOCINOLONE ACETONIDE 0.11 MG/ML
OIL TOPICAL
COMMUNITY
Start: 2024-07-04

## 2024-09-05 RX ORDER — AMMONIUM LACTATE 12 G/100G
CREAM TOPICAL
COMMUNITY
Start: 2024-04-25

## 2024-09-05 RX ORDER — CLINDAMYCIN HCL 300 MG
300 CAPSULE ORAL
COMMUNITY
Start: 2024-07-29

## 2024-09-05 RX ORDER — MIDODRINE HYDROCHLORIDE 2.5 MG/1
2.5 TABLET ORAL 3 TIMES DAILY
COMMUNITY
Start: 2024-08-14

## 2024-09-05 NOTE — PROGRESS NOTES
"      PROBLEM LIST:  pT2 N0 M0 ER positive (100%, 3+) [place] positive (50%, 2-3+) HER2 negative (1+) invasive ductal carcinoma of the left breast  Left breast lumpectomy on 10/10/2023.  Pathology showed a 2.7 cm intermediate grade invasive ductal carcinoma.  0 of 1 sentinel lymph node involved.  Oncotype score 18.  Radiation completed 12/20/2023.  Anastrozole started 1/5/2024  Anxiety  Cirrhosis  Diabetes  Hyperlipidemia  Hypertrophic obstructive cardiomyopathy  Hypothyroidism  Osteopenia  Sleep apnea    Subjective     CHIEF COMPLAINT: breast cancer    HISTORY OF PRESENT ILLNESS:   Justine Fernandes returns for follow-up.  She completed adjuvant radiotherapy to the left breast 12/20/2023.  She began anastrozole 1/5/2024.  She reports that she stopped it about 3 months ago because of increased fatigue.  However she has not noticed any change in her fatigue since stopping it.      Objective      BP 97/66   Pulse 85   Resp 18   Ht 160 cm (62.99\")   Wt 82.1 kg (181 lb)   SpO2 93%   BMI 32.07 kg/m²   Vitals:    09/05/24 1411   PainSc: 0-No pain               ECOG score: 0     General: well appearing female in no acute distress  Neuro: alert and oriented  HEENT: sclerae anicteric, oropharynx clear  Extremities: no lower extremity edema  Skin: no rashes, lesions, bruising, or petechiae  Psych: mood and affect appropriate          RECENT LABS:  Lab Results   Component Value Date    WBC 2.75 (L) 07/31/2024    HGB 13.8 07/31/2024    HCT 40.6 07/31/2024    MCV 90.4 07/31/2024     (L) 07/31/2024       Lab Results   Component Value Date    GLUCOSE 110 (H) 07/31/2024    BUN 10 07/31/2024    CREATININE 0.72 07/31/2024    EGFRIFNONA 72 12/04/2019    EGFRIFAFRI 96 09/28/2022    BCR 13.9 07/31/2024    K 4.2 07/31/2024    CO2 25.0 07/31/2024    CALCIUM 9.9 07/31/2024    PROTENTOTREF 7.5 04/15/2024    ALBUMIN 3.9 07/31/2024    LABIL2 1.1 (L) 04/15/2024    AST 27 07/31/2024    ALT 13 07/31/2024               ASSESSMENT AND " PLAN:     Justine Fernandes is a 69 y.o. female with a T2N0M0 ER+ Her2 negative IDC of the left breast.     Radiation completed 12/20/2023.  Anastrozole started 1/5/2024.  She took it for about 5 months, and then stopped it because of fatigue.  As her fatigue has not changed since stopping the medication, I recommend that she resume it.  If she has any significant worsening of symptoms with resuming anastrozole, she will call and we can consider trying a different medication.    Mammogram 7/1/2024 was category 3.  6-month follow-up was recommended.  This is scheduled for January.    Osteopenia: she had a dexa scan 8/20/23.  Plan to repeat in 2 years.  Encouraged vitamin D supplement and weightbearing exercise such as walking 30 minutes 5 days a week.    Follow-up in 6 months.                    Marisol Hernandez MD  [facility]    9/5/2024          CC:

## 2024-09-24 ENCOUNTER — ANESTHESIA EVENT (OUTPATIENT)
Dept: PERIOP | Facility: HOSPITAL | Age: 69
End: 2024-09-24
Payer: MEDICARE

## 2024-09-24 ENCOUNTER — PRE-ADMISSION TESTING (OUTPATIENT)
Dept: PREADMISSION TESTING | Facility: HOSPITAL | Age: 69
End: 2024-09-24
Payer: MEDICARE

## 2024-09-24 VITALS — WEIGHT: 186.95 LBS | HEIGHT: 63 IN | BODY MASS INDEX: 33.12 KG/M2

## 2024-09-24 LAB
ANION GAP SERPL CALCULATED.3IONS-SCNC: 10 MMOL/L (ref 5–15)
BUN SERPL-MCNC: 13 MG/DL (ref 8–23)
BUN/CREAT SERPL: 21 (ref 7–25)
CALCIUM SPEC-SCNC: 9.8 MG/DL (ref 8.6–10.5)
CHLORIDE SERPL-SCNC: 99 MMOL/L (ref 98–107)
CO2 SERPL-SCNC: 27 MMOL/L (ref 22–29)
CREAT SERPL-MCNC: 0.62 MG/DL (ref 0.57–1)
DEPRECATED RDW RBC AUTO: 46.4 FL (ref 37–54)
EGFRCR SERPLBLD CKD-EPI 2021: 97.7 ML/MIN/1.73
ERYTHROCYTE [DISTWIDTH] IN BLOOD BY AUTOMATED COUNT: 13.6 % (ref 12.3–15.4)
GLUCOSE SERPL-MCNC: 190 MG/DL (ref 65–99)
HCT VFR BLD AUTO: 39.5 % (ref 34–46.6)
HGB BLD-MCNC: 13.2 G/DL (ref 12–15.9)
MCH RBC QN AUTO: 30.9 PG (ref 26.6–33)
MCHC RBC AUTO-ENTMCNC: 33.4 G/DL (ref 31.5–35.7)
MCV RBC AUTO: 92.5 FL (ref 79–97)
PLATELET # BLD AUTO: 100 10*3/MM3 (ref 140–450)
PMV BLD AUTO: 9.8 FL (ref 6–12)
POTASSIUM SERPL-SCNC: 3.9 MMOL/L (ref 3.5–5.2)
RBC # BLD AUTO: 4.27 10*6/MM3 (ref 3.77–5.28)
SODIUM SERPL-SCNC: 136 MMOL/L (ref 136–145)
WBC NRBC COR # BLD AUTO: 2.19 10*3/MM3 (ref 3.4–10.8)

## 2024-09-24 PROCEDURE — 36415 COLL VENOUS BLD VENIPUNCTURE: CPT

## 2024-09-24 PROCEDURE — 80048 BASIC METABOLIC PNL TOTAL CA: CPT

## 2024-09-24 PROCEDURE — 85027 COMPLETE CBC AUTOMATED: CPT

## 2024-09-24 RX ORDER — ANASTROZOLE 1 MG/1
1 TABLET ORAL DAILY
COMMUNITY

## 2024-09-24 RX ORDER — FAMOTIDINE 10 MG/ML
20 INJECTION, SOLUTION INTRAVENOUS ONCE
Status: CANCELLED | OUTPATIENT
Start: 2024-09-24 | End: 2024-09-24

## 2024-09-24 RX ORDER — SODIUM CHLORIDE 9 MG/ML
40 INJECTION, SOLUTION INTRAVENOUS AS NEEDED
Status: CANCELLED | OUTPATIENT
Start: 2024-09-24

## 2024-09-24 RX ORDER — SODIUM CHLORIDE 0.9 % (FLUSH) 0.9 %
10 SYRINGE (ML) INJECTION AS NEEDED
Status: CANCELLED | OUTPATIENT
Start: 2024-09-24

## 2024-09-25 ENCOUNTER — HOSPITAL ENCOUNTER (OUTPATIENT)
Facility: HOSPITAL | Age: 69
Discharge: HOME | End: 2024-09-26
Attending: OBSTETRICS & GYNECOLOGY | Admitting: OBSTETRICS & GYNECOLOGY
Payer: MEDICARE

## 2024-09-25 ENCOUNTER — ANESTHESIA (OUTPATIENT)
Dept: PERIOP | Facility: HOSPITAL | Age: 69
End: 2024-09-25
Payer: MEDICARE

## 2024-09-25 PROBLEM — N81.6 RECTOCELE: Status: ACTIVE | Noted: 2024-09-25

## 2024-09-25 PROBLEM — I50.30 (HFPEF) HEART FAILURE WITH PRESERVED EJECTION FRACTION: Status: ACTIVE | Noted: 2024-09-25

## 2024-09-25 LAB
GLUCOSE BLDC GLUCOMTR-MCNC: 106 MG/DL (ref 70–130)
GLUCOSE BLDC GLUCOMTR-MCNC: 122 MG/DL (ref 70–130)
GLUCOSE BLDC GLUCOMTR-MCNC: 219 MG/DL (ref 70–130)

## 2024-09-25 PROCEDURE — 63710000001 INSULIN LISPRO (HUMAN) PER 5 UNITS: Performed by: INTERNAL MEDICINE

## 2024-09-25 PROCEDURE — 25010000002 PHENYLEPHRINE 10 MG/ML SOLUTION 1 ML VIAL

## 2024-09-25 PROCEDURE — 25010000002 DEXAMETHASONE PER 1 MG

## 2024-09-25 PROCEDURE — 82948 REAGENT STRIP/BLOOD GLUCOSE: CPT

## 2024-09-25 PROCEDURE — 25010000002 CEFAZOLIN PER 500 MG: Performed by: OBSTETRICS & GYNECOLOGY

## 2024-09-25 PROCEDURE — 25010000002 PROPOFOL 10 MG/ML EMULSION

## 2024-09-25 PROCEDURE — 25810000003 LACTATED RINGERS PER 1000 ML: Performed by: OBSTETRICS & GYNECOLOGY

## 2024-09-25 PROCEDURE — 25810000003 LACTATED RINGERS PER 1000 ML: Performed by: STUDENT IN AN ORGANIZED HEALTH CARE EDUCATION/TRAINING PROGRAM

## 2024-09-25 PROCEDURE — 25010000002 ONDANSETRON PER 1 MG: Performed by: STUDENT IN AN ORGANIZED HEALTH CARE EDUCATION/TRAINING PROGRAM

## 2024-09-25 PROCEDURE — 25010000002 FENTANYL CITRATE (PF) 50 MCG/ML SOLUTION

## 2024-09-25 PROCEDURE — 25010000002 FENTANYL CITRATE (PF) 100 MCG/2ML SOLUTION

## 2024-09-25 PROCEDURE — 25010000002 MORPHINE PER 10 MG: Performed by: OBSTETRICS & GYNECOLOGY

## 2024-09-25 PROCEDURE — 25010000002 HEPARIN (PORCINE) PER 1000 UNITS: Performed by: OBSTETRICS & GYNECOLOGY

## 2024-09-25 DEVICE — FLOSEAL HEMOSTATIC MATRIX, 5ML
Type: IMPLANTABLE DEVICE | Site: VAGINA | Status: FUNCTIONAL
Brand: FLOSEAL HEMOSTATIC MATRIX

## 2024-09-25 RX ORDER — INSULIN LISPRO 100 [IU]/ML
2-7 INJECTION, SOLUTION INTRAVENOUS; SUBCUTANEOUS
Status: DISCONTINUED | OUTPATIENT
Start: 2024-09-25 | End: 2024-09-26 | Stop reason: HOSPADM

## 2024-09-25 RX ORDER — POLYETHYLENE GLYCOL 3350 17 G/17G
17 POWDER, FOR SOLUTION ORAL DAILY PRN
Status: DISCONTINUED | OUTPATIENT
Start: 2024-09-25 | End: 2024-09-26 | Stop reason: HOSPADM

## 2024-09-25 RX ORDER — NALOXONE HCL 0.4 MG/ML
0.4 VIAL (ML) INJECTION
Status: DISCONTINUED | OUTPATIENT
Start: 2024-09-25 | End: 2024-09-26 | Stop reason: HOSPADM

## 2024-09-25 RX ORDER — DROPERIDOL 2.5 MG/ML
0.62 INJECTION, SOLUTION INTRAMUSCULAR; INTRAVENOUS ONCE AS NEEDED
Status: DISCONTINUED | OUTPATIENT
Start: 2024-09-25 | End: 2024-09-25 | Stop reason: HOSPADM

## 2024-09-25 RX ORDER — NICOTINE POLACRILEX 4 MG
15 LOZENGE BUCCAL
Status: DISCONTINUED | OUTPATIENT
Start: 2024-09-25 | End: 2024-09-26 | Stop reason: HOSPADM

## 2024-09-25 RX ORDER — MIDAZOLAM HYDROCHLORIDE 1 MG/ML
0.5 INJECTION INTRAMUSCULAR; INTRAVENOUS
Status: DISCONTINUED | OUTPATIENT
Start: 2024-09-25 | End: 2024-09-25 | Stop reason: HOSPADM

## 2024-09-25 RX ORDER — FENTANYL CITRATE 50 UG/ML
INJECTION, SOLUTION INTRAMUSCULAR; INTRAVENOUS AS NEEDED
Status: DISCONTINUED | OUTPATIENT
Start: 2024-09-25 | End: 2024-09-25 | Stop reason: SURG

## 2024-09-25 RX ORDER — SODIUM CHLORIDE, SODIUM LACTATE, POTASSIUM CHLORIDE, CALCIUM CHLORIDE 600; 310; 30; 20 MG/100ML; MG/100ML; MG/100ML; MG/100ML
9 INJECTION, SOLUTION INTRAVENOUS CONTINUOUS
Status: DISCONTINUED | OUTPATIENT
Start: 2024-09-25 | End: 2024-09-26 | Stop reason: HOSPADM

## 2024-09-25 RX ORDER — HYDROMORPHONE HYDROCHLORIDE 1 MG/ML
0.5 INJECTION, SOLUTION INTRAMUSCULAR; INTRAVENOUS; SUBCUTANEOUS
Status: DISCONTINUED | OUTPATIENT
Start: 2024-09-25 | End: 2024-09-25 | Stop reason: HOSPADM

## 2024-09-25 RX ORDER — ONDANSETRON 2 MG/ML
4 INJECTION INTRAMUSCULAR; INTRAVENOUS EVERY 6 HOURS PRN
Status: DISCONTINUED | OUTPATIENT
Start: 2024-09-25 | End: 2024-09-26 | Stop reason: HOSPADM

## 2024-09-25 RX ORDER — BUPIVACAINE HCL/0.9 % NACL/PF 0.125 %
PLASTIC BAG, INJECTION (ML) EPIDURAL AS NEEDED
Status: DISCONTINUED | OUTPATIENT
Start: 2024-09-25 | End: 2024-09-25 | Stop reason: SURG

## 2024-09-25 RX ORDER — FAMOTIDINE 20 MG/1
20 TABLET, FILM COATED ORAL ONCE
Status: COMPLETED | OUTPATIENT
Start: 2024-09-25 | End: 2024-09-25

## 2024-09-25 RX ORDER — FENTANYL CITRATE 50 UG/ML
INJECTION, SOLUTION INTRAMUSCULAR; INTRAVENOUS
Status: COMPLETED
Start: 2024-09-25 | End: 2024-09-25

## 2024-09-25 RX ORDER — SODIUM CHLORIDE 0.9 % (FLUSH) 0.9 %
10 SYRINGE (ML) INJECTION EVERY 12 HOURS SCHEDULED
Status: DISCONTINUED | OUTPATIENT
Start: 2024-09-25 | End: 2024-09-25 | Stop reason: HOSPADM

## 2024-09-25 RX ORDER — ESCITALOPRAM OXALATE 10 MG/1
10 TABLET ORAL EVERY MORNING
Status: DISCONTINUED | OUTPATIENT
Start: 2024-09-26 | End: 2024-09-26 | Stop reason: HOSPADM

## 2024-09-25 RX ORDER — MORPHINE SULFATE 2 MG/ML
1 INJECTION, SOLUTION INTRAMUSCULAR; INTRAVENOUS EVERY 4 HOURS PRN
Status: DISCONTINUED | OUTPATIENT
Start: 2024-09-25 | End: 2024-09-26 | Stop reason: HOSPADM

## 2024-09-25 RX ORDER — ONDANSETRON 2 MG/ML
INJECTION INTRAMUSCULAR; INTRAVENOUS AS NEEDED
Status: DISCONTINUED | OUTPATIENT
Start: 2024-09-25 | End: 2024-09-25 | Stop reason: SURG

## 2024-09-25 RX ORDER — SIMETHICONE 80 MG
80 TABLET,CHEWABLE ORAL 4 TIMES DAILY PRN
Status: DISCONTINUED | OUTPATIENT
Start: 2024-09-25 | End: 2024-09-26 | Stop reason: HOSPADM

## 2024-09-25 RX ORDER — OXYCODONE AND ACETAMINOPHEN 7.5; 325 MG/1; MG/1
1 TABLET ORAL EVERY 4 HOURS PRN
Status: DISCONTINUED | OUTPATIENT
Start: 2024-09-25 | End: 2024-09-26 | Stop reason: HOSPADM

## 2024-09-25 RX ORDER — FENTANYL CITRATE 50 UG/ML
50 INJECTION, SOLUTION INTRAMUSCULAR; INTRAVENOUS
Status: DISCONTINUED | OUTPATIENT
Start: 2024-09-25 | End: 2024-09-25 | Stop reason: HOSPADM

## 2024-09-25 RX ORDER — ONDANSETRON 4 MG/1
4 TABLET, ORALLY DISINTEGRATING ORAL EVERY 6 HOURS PRN
Status: DISCONTINUED | OUTPATIENT
Start: 2024-09-25 | End: 2024-09-26 | Stop reason: HOSPADM

## 2024-09-25 RX ORDER — LABETALOL HYDROCHLORIDE 5 MG/ML
10 INJECTION, SOLUTION INTRAVENOUS EVERY 4 HOURS PRN
Status: DISCONTINUED | OUTPATIENT
Start: 2024-09-25 | End: 2024-09-26 | Stop reason: HOSPADM

## 2024-09-25 RX ORDER — IBUPROFEN 600 MG/1
1 TABLET ORAL
Status: DISCONTINUED | OUTPATIENT
Start: 2024-09-25 | End: 2024-09-26 | Stop reason: HOSPADM

## 2024-09-25 RX ORDER — SODIUM CHLORIDE, SODIUM LACTATE, POTASSIUM CHLORIDE, CALCIUM CHLORIDE 600; 310; 30; 20 MG/100ML; MG/100ML; MG/100ML; MG/100ML
100 INJECTION, SOLUTION INTRAVENOUS CONTINUOUS
Status: DISCONTINUED | OUTPATIENT
Start: 2024-09-25 | End: 2024-09-26 | Stop reason: HOSPADM

## 2024-09-25 RX ORDER — ALBUTEROL SULFATE 90 UG/1
2 INHALANT RESPIRATORY (INHALATION) EVERY 6 HOURS PRN
Status: DISCONTINUED | OUTPATIENT
Start: 2024-09-25 | End: 2024-09-26 | Stop reason: HOSPADM

## 2024-09-25 RX ORDER — HEPARIN SODIUM 5000 [USP'U]/ML
5000 INJECTION, SOLUTION INTRAVENOUS; SUBCUTANEOUS ONCE
Status: COMPLETED | OUTPATIENT
Start: 2024-09-25 | End: 2024-09-25

## 2024-09-25 RX ORDER — DEXAMETHASONE SODIUM PHOSPHATE 4 MG/ML
INJECTION, SOLUTION INTRA-ARTICULAR; INTRALESIONAL; INTRAMUSCULAR; INTRAVENOUS; SOFT TISSUE AS NEEDED
Status: DISCONTINUED | OUTPATIENT
Start: 2024-09-25 | End: 2024-09-25 | Stop reason: SURG

## 2024-09-25 RX ORDER — LIDOCAINE HYDROCHLORIDE 10 MG/ML
INJECTION, SOLUTION EPIDURAL; INFILTRATION; INTRACAUDAL; PERINEURAL AS NEEDED
Status: DISCONTINUED | OUTPATIENT
Start: 2024-09-25 | End: 2024-09-25 | Stop reason: SURG

## 2024-09-25 RX ORDER — LIDOCAINE HYDROCHLORIDE 10 MG/ML
0.5 INJECTION, SOLUTION EPIDURAL; INFILTRATION; INTRACAUDAL; PERINEURAL ONCE AS NEEDED
Status: COMPLETED | OUTPATIENT
Start: 2024-09-25 | End: 2024-09-25

## 2024-09-25 RX ORDER — PROPOFOL 10 MG/ML
VIAL (ML) INTRAVENOUS AS NEEDED
Status: DISCONTINUED | OUTPATIENT
Start: 2024-09-25 | End: 2024-09-25 | Stop reason: SURG

## 2024-09-25 RX ORDER — LEVOTHYROXINE SODIUM 50 UG/1
50 TABLET ORAL DAILY
Status: DISCONTINUED | OUTPATIENT
Start: 2024-09-26 | End: 2024-09-26 | Stop reason: HOSPADM

## 2024-09-25 RX ORDER — DEXTROSE MONOHYDRATE 25 G/50ML
25 INJECTION, SOLUTION INTRAVENOUS
Status: DISCONTINUED | OUTPATIENT
Start: 2024-09-25 | End: 2024-09-26 | Stop reason: HOSPADM

## 2024-09-25 RX ORDER — ACETAMINOPHEN 500 MG
1000 TABLET ORAL ONCE
Status: COMPLETED | OUTPATIENT
Start: 2024-09-25 | End: 2024-09-25

## 2024-09-25 RX ADMIN — ONDANSETRON 4 MG: 2 INJECTION INTRAMUSCULAR; INTRAVENOUS at 13:15

## 2024-09-25 RX ADMIN — SODIUM CHLORIDE 2000 MG: 900 INJECTION INTRAVENOUS at 12:29

## 2024-09-25 RX ADMIN — FAMOTIDINE 20 MG: 20 TABLET, FILM COATED ORAL at 11:34

## 2024-09-25 RX ADMIN — LIDOCAINE HYDROCHLORIDE 60 MG: 10 INJECTION, SOLUTION EPIDURAL; INFILTRATION; INTRACAUDAL; PERINEURAL at 12:24

## 2024-09-25 RX ADMIN — SODIUM CHLORIDE, POTASSIUM CHLORIDE, SODIUM LACTATE AND CALCIUM CHLORIDE 100 ML/HR: 600; 310; 30; 20 INJECTION, SOLUTION INTRAVENOUS at 18:30

## 2024-09-25 RX ADMIN — PROPOFOL 60 MG: 10 INJECTION, EMULSION INTRAVENOUS at 12:24

## 2024-09-25 RX ADMIN — DEXAMETHASONE SODIUM PHOSPHATE 4 MG: 4 INJECTION INTRA-ARTICULAR; INTRALESIONAL; INTRAMUSCULAR; INTRAVENOUS; SOFT TISSUE at 12:27

## 2024-09-25 RX ADMIN — FENTANYL CITRATE 50 MCG: 50 INJECTION, SOLUTION INTRAMUSCULAR; INTRAVENOUS at 14:09

## 2024-09-25 RX ADMIN — MORPHINE SULFATE 1 MG: 2 INJECTION, SOLUTION INTRAMUSCULAR; INTRAVENOUS at 22:26

## 2024-09-25 RX ADMIN — FENTANYL CITRATE 100 MCG: 50 INJECTION, SOLUTION INTRAMUSCULAR; INTRAVENOUS at 12:24

## 2024-09-25 RX ADMIN — ACETAMINOPHEN 1000 MG: 500 TABLET ORAL at 11:34

## 2024-09-25 RX ADMIN — PHENYLEPHRINE HYDROCHLORIDE 0.2 MCG/KG/MIN: 10 INJECTION INTRAVENOUS at 12:29

## 2024-09-25 RX ADMIN — METFORMIN HYDROCHLORIDE 500 MG: 500 TABLET, FILM COATED ORAL at 18:27

## 2024-09-25 RX ADMIN — SODIUM CHLORIDE, POTASSIUM CHLORIDE, SODIUM LACTATE AND CALCIUM CHLORIDE 9 ML/HR: 600; 310; 30; 20 INJECTION, SOLUTION INTRAVENOUS at 11:35

## 2024-09-25 RX ADMIN — SODIUM CHLORIDE 2000 MG: 900 INJECTION INTRAVENOUS at 20:17

## 2024-09-25 RX ADMIN — Medication 50 MCG: at 12:27

## 2024-09-25 RX ADMIN — MORPHINE SULFATE 1 MG: 2 INJECTION, SOLUTION INTRAMUSCULAR; INTRAVENOUS at 18:28

## 2024-09-25 RX ADMIN — INSULIN LISPRO 3 UNITS: 100 INJECTION, SOLUTION INTRAVENOUS; SUBCUTANEOUS at 22:38

## 2024-09-25 RX ADMIN — LIDOCAINE HYDROCHLORIDE 0.5 ML: 10 INJECTION, SOLUTION EPIDURAL; INFILTRATION; INTRACAUDAL; PERINEURAL at 11:35

## 2024-09-25 NOTE — OP NOTE
GYN Operative Note    Patient Name, age and sex:  Justine Fernandes, 69 y.o., female  Procedure Date:  9/25/2024    Surgeons and Role:     * Jackie Wen MD - Primary    Additional Staff:  Adriana TRIPATHI  Medically necessary for assistance.Complex retraction. Suturing skills.Complex and critical patient postioning.     Pre-op diagnosis: Symptomatic third-degree rectocele, enterocele posterior    Postop diagnosis: Same plus hemorrhoids    * No Diagnosis Codes entered *    * No Diagnosis Codes entered *    Procedure(s):  POSTERIOR REPAIR RECTOCELE COLPORRHAPHY, REPAIR ENTEROCELE    Indications for Operation: Patient is a 69-year-old female who has had a very large symptomatic prolapsed rectocele and high posterior enterocele for several years.  She has not been into a position where she was able to repair that.  With all the straining and constipation and difficulty having bowel movements is her made her hemorrhoids even more symptomatic.  They get very swollen and bulge and so at this point she is ready to undertake surgical management of the probable inciting cause which is the rectocele.  She was counseled to she does need to keep good control of constipation or otherwise that will return very quickly.  Also the response on [place] of the procedure all discussed the patient agreed consented understands can have complications including fistulas infections recurrence of prolapse and other medical or surgical complications and risk of anesthesia even death.    Antibiotics:  cefazolin (Ancef) ordered on call to OR    Anesthesia:  Type: General -   ASA:  ASA status not filed in the log.    Operative Findings: The uterus is well supported.  There is no anterior vaginal wall prolapse.  There is a high posterior enterocele with 1/3 degree midline lower rectocele.  There was no anterior wall prolapse the mucosa was somewhat 8 atrophic.  There was a little asymmetry in the labia with a longer labia minora and  majora on the left than the right.  Very large prolapsed hemorrhoids are noted.  Rectal exam did not show any masses just hemorrhoid.  Rectal exam also during and after procedure showed no injury with no stitches that were in the rectum or through the rectal mucosa.    Specimens Removed:  None    Estimated Blood Loss: 100 mL    Urine Output: N/A mL    Complications: None    Procedure Narrative: Patient was taken the operating room where general anesthesia was found to be adequate.  She is then prepped and draped normal sterile fashion dorsolithotomy position in the yellowfin stirrups.  Timeout had been performed.  The degree of prolapse was assessed.  The only defect was the high posterior enterocele and rectocele.  It was midline as well.  Then the peritoneum was grasped with Allis clamps.  The very widened genital hiatus was noticed as well.  The perineum was then injected with the injectable saline local solution.  A darrell-shaped wedge excision of the skin was removed to expose the underlying perineal body and bulbocavernosus.  The midline vaginal mucosa was then undermined and dissected along the length of the rectocele to the upper posterior enterocele.  This done with a posterior bridge technique with the Metzenbaums.  Once the full bulge was dissected out the Metzenbaums were then used to get the rectovaginal fascia off of the mucosa and the enterocele sac dissected out.  The enterocele was then closed with a pursestring 0 Vicryl suture.  The peritoneum was reduced above the pursestring closure.  The rectovaginal fascia was then reapproximated using interrupted 0 Vicryl suture in 2 layers to reduce the rectocele in the midline.  Copious irrigation was performed rectal exams during this procedure shows reduction of the prolapse but no defects and no sutures no injury to the bowel.  Then the mucosa was trimmed and closed with a running locking 3-0 Vicryl suture.  Some Floseal was placed to help with bleeding  control and oozing from the dissection plane.  The perineal body was then reconstructed interrupted 0 Vicryl sutures used to strengthen and lengthen the perineal body.  The subcuticular skin closed with subcuticular 3-0 Vicryl.  Copious irrigation was then performed final rectal exam again great support no sutures no defects no injury to the bowel.  Copious irrigation performed vaginally hemostasis was confirmed.  The Rivas catheter was removed some vaginal packing with Premarin cream was placed.  She was then taken a lithotomy position a final count was correct.  She is awoken from general esthesia and taken to recovery in stable condition.    Jackie Wen MD - 9/25/2024, 13:32 EDT

## 2024-09-25 NOTE — CONSULTS
Patient Name: Justine Fernandes  MRN: [id number]  : 1955  DOS: 2024    Attending/requesting physician: Jackie Wen,*    Primary Care Provider: Radha Cooney PA-C  Reason for consult: Postop medical management with diabetes mellitus CHF    Subjective   Patient is a pleasant 69 y.o. female presented for scheduled procedure by Dr. Wen.    Patient has been diagnosed with symptomatic third-degree rectocele, enterocele posterior.  Today she underwent posterior repair of her rectocele, colporrhaphy, and repair of enterocele.  Surgery was done under general anesthesia, was tolerated well.    I saw her in PACU, reviewed with patient her past medical history and home medications.  She is under the care of of cardiology at [email or web address] for congestive heart failure with preserved ejection fraction.  She is on a medication Camzyos gets her echo checked few times a year.    No PND orthopnea no home oxygen.    She is on Glucophage for type 2 diabetes.    Carries a history of cirrhosis  Allergies:  No Known Allergies    Meds:  Facility-Administered Medications Prior to Admission   Medication Dose Route Frequency Provider Last Rate Last Admin    [DISCONTINUED] cyanocobalamin injection 1,000 mcg  1,000 mcg Intramuscular Q28 Days Radha Cooney PA-C         Medications Prior to Admission   Medication Sig Dispense Refill Last Dose    ammonium lactate (AMLACTIN) 12 % cream As Needed.   Past Week    anastrozole (ARIMIDEX) 1 MG tablet Take 1 tablet by mouth Daily.   2024    escitalopram (LEXAPRO) 10 MG tablet Take 1 tablet by mouth Every Morning. 90 tablet 1 2024    Mavacamten (Camzyos) 2.5 MG capsule    2024 at 1000    metFORMIN (GLUCOPHAGE) 500 MG tablet Take 1 tablet by mouth 2 (Two) Times a Day With Meals. 180 tablet 1 2024 at 0130    Synthroid 50 MCG tablet Take 1 tablet by mouth Daily. 90 tablet 1 2024 at 0900    vitamin D3 (Vitamin D) 125 MCG (5000 UT) capsule  capsule Take 1 capsule by mouth Daily.   9/24/2024    albuterol sulfate  (90 Base) MCG/ACT inhaler Inhale 2 puffs Every 6 (Six) Hours As Needed for Wheezing. 18 g 3 More than a month    Fluocinolone Acetonide Scalp 0.01 % oil    More than a month    Menthol-Zinc Oxide (Calmoseptine) 0.44-20.6 % ointment Apply 1 application  topically to the appropriate area as directed 2 (Two) Times a Day. 71 g 3 More than a month         History:   Past Medical History:   Diagnosis Date    Anxiety     Asthma     Mild     Breast cancer 10/10/2023    Left Breast    Bundle branch block     Carpal tunnel syndrome, Right     Circulation disorder of lower extremity     legs    Cirrhosis, nonalcoholic     Colon polyps     Congenital heart defect     Diabetes mellitus     Elevated cholesterol     Hyperlipidemia     Hypertrophic obstructive cardiomyopathy     Hypothyroid     Leukopenia     Melanocytic nevus of trunk     Osteopenia     Paresthesia of hand     Routine medical exam 06/30/2023    Senile hyperkeratosis     Sensorineural hearing loss     Sleep apnea     CPAP nightly    Steatosis of liver     Stress incontinence      Past Surgical History:   Procedure Laterality Date    BLADDER SURGERY  1973    BREAST CYST EXCISION Left 2000    excisional biopsy    BREAST LUMPECTOMY Left 10/10/2023    Procedure: BREAST LUMPECTOMY WITH NEEDLE LOCALIZATION, SENTINEL BIOPSY LEFT;  Surgeon: Chin Carey MD;  Location: [facility];  Service: General;  Laterality: Left;    CARPAL TUNNEL RELEASE  2018    right     COLONOSCOPY      KIDNEY STONE SURGERY  1994    KNEE SURGERY  2019    meniscus repair- left knee    NASAL POLYP SURGERY  2006 1999    NASAL SEPTUM SURGERY  1999    REPLACEMENT TOTAL HIP LATERAL POSITION Right 10/02/2020    TONSILLECTOMY AND ADENOIDECTOMY      TUBAL ABDOMINAL LIGATION      VAGINAL REPAIR  1976    VARICOSE VEIN SURGERY  2006    Right leg     WISDOM TOOTH EXTRACTION       Family History   Problem Relation Age of  "Onset    Breast cancer Mother 80    Heart disease Mother     Cancer Mother     Colon cancer Mother     Arthritis Mother     Angina Mother     Osteoporosis Mother     Hyperlipidemia Mother     Stroke Father     Colon cancer Father     Heart attack Father     Lung cancer Father     Cancer Sister     Migraines Sister     Breast cancer Sister 66    Cancer Sister     Cancer Brother     Arthritis Brother     Hypertension Brother     Cancer Brother     Diabetes Brother     Breast cancer Maternal Cousin 42     Social History     Tobacco Use    Smoking status: Never    Smokeless tobacco: Never   Vaping Use    Vaping status: Never Used   Substance Use Topics    Alcohol use: Never    Drug use: Never       Review of Systems  Pertinent items are noted in HPI    Vital Signs  /68 (BP Location: Right arm, Patient Position: Lying)   Pulse 68   Temp 98.7 °F (37.1 °C) (Temporal)   Resp 16   Ht 160 cm (63\")   Wt 84.4 kg (186 lb)   SpO2 94%   BMI 32.95 kg/m²     Physical Exam:    General Appearance:    Alert, cooperative, in no acute distress   Head:    Normocephalic, without obvious abnormality, atraumatic   Eyes:            Lids and lashes normal, conjunctivae and sclerae normal, no   icterus   Ears:    Ears appear intact with no abnormalities noted   Throat:   No oral lesions, no thrush, oral mucosa moist   Neck:   No adenopathy, supple, trachea midline, no thyromegaly         Lungs:     Clear to auscultation,respirations regular, even and                   unlabored    Heart:    Regular rhythm and normal rate, normal S1 and S2, no            murmur, no gallop   Abdomen:     Normal bowel sounds, no masses, no organomegaly, soft,        nontender, nondistended, no guarding, no rebound              tenderness   Genitalia:    Deferred   Extremities:   Moves all extremities well, no edema, no cyanosis, no              redness   Pulses:   Pulses palpable and equal bilaterally   Skin:   No bleeding, bruising or rash "   Neurologic:   Cranial nerves 2 - 12 grossly intact, no gross motor deficit      I reviewed the patient's new clinical results.       Results from last 7 days   Lab Units 09/24/24  1243   WBC 10*3/mm3 2.19*   HEMOGLOBIN g/dL 13.2   HEMATOCRIT % 39.5   PLATELETS 10*3/mm3 100*     Results from last 7 days   Lab Units 09/24/24  1243   SODIUM mmol/L 136   POTASSIUM mmol/L 3.9   CHLORIDE mmol/L 99   CO2 mmol/L 27.0   BUN mg/dL 13   CREATININE mg/dL 0.62   CALCIUM mg/dL 9.8   GLUCOSE mg/dL 190*     Lab Results   Component Value Date    HGBA1C 5.50 07/31/2024           Assessment and Plan:   Status post POSTERIOR REPAIR RECTOCELE COLPORRHAPHY, REPAIR ENTEROCELE    Rectocele    Anxiety    Type 2 diabetes mellitus with diabetic polyneuropathy, without long-term current use of insulin    Sleep apnea    Hypothyroidism    Other cirrhosis of liver    (HFpEF) heart failure with preserved ejection fraction      Plan  Admit for further management.    -DM type 2  Hgb A1C 5.5  Hold OHA as appropriate  FSBG AC/HS, ( q 6 when NPO), along with correction Humalog.  Long acting insulin if needed.      -HFpEF:  Resume home regimen of Camzyos.  She took her home dose today and can take tomorrow's dose will after her discharge home tomorrow  Monitor volume status closely.    -CHANTELLE:  Continue CPAP.   Monitor O2 sats.    -Encourage ambulation, encourage incentive spirometer use.  Vaginal packing to be removed in AM.      Thank you for the chance to participate in the care of Ms. Fernandes .  We will follow along with you.    Dragon disclaimer:  Part of this encounter note is an electronic transcription/translation of spoken language to printed text. The electronic translation of spoken language may permit erroneous, or at times, nonsensical words or phrases to be inadvertently transcribed; Although I have reviewed the note for such errors, some may still exist.    Elie Reza MD  09/25/24  18:13 EDT

## 2024-09-26 VITALS
DIASTOLIC BLOOD PRESSURE: 61 MMHG | HEART RATE: 63 BPM | TEMPERATURE: 97.6 F | RESPIRATION RATE: 17 BRPM | HEIGHT: 63 IN | SYSTOLIC BLOOD PRESSURE: 93 MMHG | BODY MASS INDEX: 32.96 KG/M2 | WEIGHT: 186 LBS | OXYGEN SATURATION: 93 %

## 2024-09-26 LAB
ANION GAP SERPL CALCULATED.3IONS-SCNC: 10 MMOL/L (ref 5–15)
BUN SERPL-MCNC: 14 MG/DL (ref 8–23)
BUN/CREAT SERPL: 24.1 (ref 7–25)
CALCIUM SPEC-SCNC: 8.9 MG/DL (ref 8.6–10.5)
CHLORIDE SERPL-SCNC: 102 MMOL/L (ref 98–107)
CO2 SERPL-SCNC: 24 MMOL/L (ref 22–29)
CREAT SERPL-MCNC: 0.58 MG/DL (ref 0.57–1)
DEPRECATED RDW RBC AUTO: 45.1 FL (ref 37–54)
EGFRCR SERPLBLD CKD-EPI 2021: 99.3 ML/MIN/1.73
ERYTHROCYTE [DISTWIDTH] IN BLOOD BY AUTOMATED COUNT: 13.4 % (ref 12.3–15.4)
GLUCOSE BLDC GLUCOMTR-MCNC: 128 MG/DL (ref 70–130)
GLUCOSE SERPL-MCNC: 135 MG/DL (ref 65–99)
HCT VFR BLD AUTO: 32.7 % (ref 34–46.6)
HGB BLD-MCNC: 11.3 G/DL (ref 12–15.9)
MCH RBC QN AUTO: 31.4 PG (ref 26.6–33)
MCHC RBC AUTO-ENTMCNC: 34.6 G/DL (ref 31.5–35.7)
MCV RBC AUTO: 90.8 FL (ref 79–97)
PLATELET # BLD AUTO: 81 10*3/MM3 (ref 140–450)
PMV BLD AUTO: 9.8 FL (ref 6–12)
POTASSIUM SERPL-SCNC: 4 MMOL/L (ref 3.5–5.2)
RBC # BLD AUTO: 3.6 10*6/MM3 (ref 3.77–5.28)
SODIUM SERPL-SCNC: 136 MMOL/L (ref 136–145)
WBC NRBC COR # BLD AUTO: 3.69 10*3/MM3 (ref 3.4–10.8)

## 2024-09-26 PROCEDURE — 85027 COMPLETE CBC AUTOMATED: CPT | Performed by: OBSTETRICS & GYNECOLOGY

## 2024-09-26 PROCEDURE — 80048 BASIC METABOLIC PNL TOTAL CA: CPT | Performed by: OBSTETRICS & GYNECOLOGY

## 2024-09-26 PROCEDURE — 82948 REAGENT STRIP/BLOOD GLUCOSE: CPT

## 2024-09-26 PROCEDURE — 94660 CPAP INITIATION&MGMT: CPT

## 2024-09-26 PROCEDURE — 25010000002 CEFAZOLIN PER 500 MG: Performed by: OBSTETRICS & GYNECOLOGY

## 2024-09-26 RX ADMIN — SODIUM CHLORIDE 2000 MG: 900 INJECTION INTRAVENOUS at 04:22

## 2024-09-26 RX ADMIN — LEVOTHYROXINE SODIUM 50 MCG: 0.05 TABLET ORAL at 08:28

## 2024-09-26 RX ADMIN — METFORMIN HYDROCHLORIDE 500 MG: 500 TABLET, FILM COATED ORAL at 08:28

## 2024-09-26 RX ADMIN — OXYCODONE HYDROCHLORIDE AND ACETAMINOPHEN 1 TABLET: 7.5; 325 TABLET ORAL at 05:17

## 2024-09-26 RX ADMIN — ESCITALOPRAM OXALATE 10 MG: 10 TABLET ORAL at 07:11

## 2024-09-26 NOTE — PLAN OF CARE
Problem: Adult Inpatient Plan of Care  Goal: Optimal Comfort and Wellbeing  Outcome: Progressing  Intervention: Provide Person-Centered Care  Recent Flowsheet Documentation  Taken 9/26/2024 0800 by Frank Elizondo, RN  Trust Relationship/Rapport:   care explained   choices provided   Goal Outcome Evaluation:  Plan of Care Reviewed With: patient        Progress: improving          Tolerating PO. Voiding spontaneously. Pain controlled. Ambulating. +vaginal spotting after vag packing removed. Discharging home.

## 2024-09-26 NOTE — PROGRESS NOTES
Surgery Post-op Note  [place]    * No Diagnosis Codes entered *    * No Diagnosis Codes entered *    Procedure(s):  POSTERIOR REPAIR RECTOCELE COLPORRHAPHY, REPAIR ENTEROCELE    Subjective     Pain controlled.  No n/v min bleeding. Vaginal packing out.  Done sitz baths x 3.  Voiding well.  No complaints.     Objective   Physical Exam  Vitals:    09/26/24 0351   BP: 94/58   Pulse: 61   Resp: 16   Temp: 97.8 °F (36.6 °C)   SpO2: 95%     General: awake, alert, comfortable    Pulm: CTAB    CVS: no M/R/G, reg rate    Abd: soft, NT, ND, NABS    Ext: warm, well perfused      Labs:  Lab Results (last 24 hours)       Procedure Component Value Units Date/Time    POC Glucose Once [[id number]]  (Abnormal) Collected: 09/25/24 2123    Specimen: Blood Updated: 09/25/24 2132     Glucose 219 mg/dL     POC Glucose Once [[id number]]  (Normal) Collected: 09/25/24 1641    Specimen: Blood Updated: 09/25/24 1642     Glucose 122 mg/dL     POC Glucose Once [[id number]]  (Normal) Collected: 09/25/24 1126    Specimen: Blood Updated: 09/25/24 1140     Glucose 106 mg/dL                 Intake and Output:    Intake/Output Summary (Last 24 hours) at 9/26/2024 0659  Last data filed at 9/26/2024 0351  Gross per 24 hour   Intake 800 ml   Output 1250 ml   Net -450 ml       Assessment     The patient is a 69 y.o. female 1 Day Post-Op after posterior repair    Plan     D/c home today  Continue instructed postop care   May do sitz bath and tucks pads use.  Sitz bath once daily   Home with daughter  No driving.   Conditon good  Postop instruction no lifting greater than 20 lbs.  Pelvic rest.  Keep stools soft.   Regular diet.   Cont home meds  Clear with hospital medicine prior to d/c.     Jackie Wen MD  09/26/24  06:59 EDT

## 2024-09-26 NOTE — PLAN OF CARE
Goal Outcome Evaluation:               Pt. Resting comfortably with IV clean, dry and in tact with LR running at 100 and antibiotics ran. Room air till bedtime then CPAP worn on shift. Pt. Ambulating to bathroom with no reporta of pain and Voiding appropriately and requested sitz bath and used sara bottle. Vaginal packing removed by RN. Mesh panties, pad and ice pack given for comfort. Sugar covered one time on shift, sugar was 219. Nurse will continue the POC and report as needed.

## 2024-09-26 NOTE — CASE MANAGEMENT/SOCIAL WORK
Case Management Discharge Note      Final Note: Home         Selected Continued Care - Admitted Since 9/25/2024       Destination    No services have been selected for the patient.                Durable Medical Equipment    No services have been selected for the patient.                Dialysis/Infusion    No services have been selected for the patient.                Home Medical Care    No services have been selected for the patient.                Therapy    No services have been selected for the patient.                Community Resources    No services have been selected for the patient.                Community & DME    No services have been selected for the patient.                         Final Discharge Disposition Code: 01 - home or self-care

## 2024-09-26 NOTE — PLAN OF CARE
Goal Outcome Evaluation:         Patient A&O x4. Room air. Small amount of bleeding noted to vaginal area. Lungs clear bilateral, abdomen soft, bowel sounds hypoactive x4. Pulses x4. VSS, Family at bedside. Will continue to monitor

## 2024-09-26 NOTE — DISCHARGE SUMMARY
Patient Name: Justine Fernandes  MRN: [id number]  : 1955  DOS: 2024    Attending: Jackie Wen,*    Primary Care Provider: Radha Cooney PA-C    Date of Admission:.2024 10:51 AM    Date of Discharge:  2024    Discharge Diagnosis:  S/p POSTERIOR REPAIR RECTOCELE COLPORRHAPHY, REPAIR ENTEROCELE      Rectocele    Anxiety    Type 2 diabetes mellitus with diabetic polyneuropathy, without long-term current use of insulin    Sleep apnea    Hypothyroidism    Other cirrhosis of liver    (HFpEF) heart failure with preserved ejection fraction      Hospital Course  At admit:  Patient is a pleasant 69 y.o. female presented for scheduled procedure by Dr. Wen.     Patient has been diagnosed with symptomatic third-degree rectocele, enterocele posterior.  Today she underwent posterior repair of her rectocele, colporrhaphy, and repair of enterocele.  Surgery was done under general anesthesia, was tolerated well.     I saw her in PACU, reviewed with patient her past medical history and home medications.  She is under the care of of cardiology at [facility] for congestive heart failure with preserved ejection fraction.  She is on a medication Camzyos gets her echo checked few times a year.     No PND orthopnea no home oxygen.     She is on Glucophage for type 2 diabetes.     Carries a history of cirrhosis    After Admit:    She was provided pain medication as needed for pain control.  Risks and benefits of opiate medications discussed with patient.    She received DVT prophylaxis with  mechanicals and early mobilization.     She used an IS for atelectasis prophylaxis.    She was able to void spontaneously . She ambulated. Vaginal packing has been removed.     She was provided a bowel regimen and was encouraged to ambulate postop day 1 which she did.    Home medications were resumed as appropriate, and labs were monitored and remained fairly stable.     With the progress she has made, she  "is ready for DC home today.      Discussed with patient regarding plan and she shows understanding and agreement.     Procedures Performed  Procedure(s):  POSTERIOR REPAIR RECTOCELE COLPORRHAPHY, REPAIR ENTEROCELE       Pertinent Test Results:    I reviewed the patient's new clinical results.   Results from last 7 days   Lab Units 24  0716 24  1243   WBC 10*3/mm3 3.69 2.19*   HEMOGLOBIN g/dL 11.3* 13.2   HEMATOCRIT % 32.7* 39.5   PLATELETS 10*3/mm3 81* 100*     Results from last 7 days   Lab Units 24  0716 24  1243   SODIUM mmol/L 136 136   POTASSIUM mmol/L 4.0 3.9   CHLORIDE mmol/L 102 99   CO2 mmol/L 24.0 27.0   BUN mg/dL 14 13   CREATININE mg/dL 0.58 0.62   CALCIUM mg/dL 8.9 9.8   GLUCOSE mg/dL 135* 190*     I reviewed the patient's new imaging including images and reports.      Discharge Assessment:       Visit Vitals  BP 94/58 (BP Location: Right arm, Patient Position: Lying)   Pulse 61   Temp 97.8 °F (36.6 °C) (Temporal)   Resp 16   Ht 160 cm (63\")   Wt 84.4 kg (186 lb)   SpO2 95%   BMI 32.95 kg/m²     Temp (24hrs), Av.2 °F (36.8 °C), Min:97.6 °F (36.4 °C), Max:98.8 °F (37.1 °C)      General Appearance:    Alert, cooperative, in no acute distress   Lungs:     Clear to auscultation,respirations regular, even and                   unlabored    Heart:    Regular rhythm and normal rate, normal S1 and  S2   Abdomen:   Soft, benign, non tender, non distended.    Extremities:   Moves all extremities well, no edema, no cyanosis, no              redness   Pulses:   Pulses palpable and equal bilaterally   Skin:   No bleeding, bruising or rash            Discharge Disposition: Home          Discharge Medications        Continue These Medications        Instructions Start Date   albuterol sulfate  (90 Base) MCG/ACT inhaler  Commonly known as: PROVENTIL HFA;VENTOLIN HFA;PROAIR HFA   2 puffs, Inhalation, Every 6 Hours PRN      ammonium lactate 12 % cream  Commonly known as: AMLACTIN   As " Needed.      anastrozole 1 MG tablet  Commonly known as: ARIMIDEX   1 mg, Oral, Daily      Calmoseptine 0.44-20.6 % ointment  Generic drug: Menthol-Zinc Oxide   1 application , Topical, 2 Times Daily      Camzyos 2.5 MG capsule  Generic drug: Mavacamten   No dose, route, or frequency recorded.      escitalopram 10 MG tablet  Commonly known as: LEXAPRO   10 mg, Oral, Every Morning      Fluocinolone Acetonide Scalp 0.01 % oil       metFORMIN 500 MG tablet  Commonly known as: GLUCOPHAGE   500 mg, Oral, 2 Times Daily With Meals      Synthroid 50 MCG tablet  Generic drug: levothyroxine   50 mcg, Oral, Daily      vitamin D3 125 MCG (5000 UT) capsule capsule   5,000 Units, Oral, Daily               Discharge Diet: Regular    Activity at Discharge: Restrictions per Dr. Wen  (May do sitz bath and tucks pads use.  Sitz bath once daily   Home with daughter  No driving.   Conditon good  Postop instruction no lifting greater than 20 lbs.  Pelvic rest.  Keep stools soft. )       Future Appointments   Date Time Provider Department Center   1/2/2025  1:30 PM [facility] FOLLOW-UP  [facility]   1/24/2025  2:30 PM Lizz Hussein MD [facility] None   2/6/2025 11:30 AM Radha Cooney PA-C [facility]   3/7/2025  1:15 PM Tish Chappell APRN [facility]         Dragon disclaimer:  Part of this encounter note is an electronic transcription/translation of spoken language to printed text. The electronic translation of spoken language may permit erroneous, or at times, nonsensical words or phrases to be inadvertently transcribed; Although I have reviewed the note for such errors, some may still exist.       Elie Reza MD  09/26/24  08:42 EDT

## 2024-12-11 RX ORDER — LANCETS 28 GAUGE
EACH MISCELLANEOUS
Qty: 100 EACH | Refills: 12 | Status: SHIPPED | OUTPATIENT
Start: 2024-12-11

## 2024-12-11 RX ORDER — BLOOD-GLUCOSE METER
1 KIT MISCELLANEOUS DAILY
Qty: 1 EACH | Refills: 0 | Status: SHIPPED | OUTPATIENT
Start: 2024-12-11

## 2024-12-16 DIAGNOSIS — C50.412 MALIGNANT NEOPLASM OF UPPER-OUTER QUADRANT OF LEFT BREAST IN FEMALE, ESTROGEN RECEPTOR POSITIVE: Primary | ICD-10-CM

## 2024-12-16 DIAGNOSIS — Z17.0 MALIGNANT NEOPLASM OF UPPER-OUTER QUADRANT OF LEFT BREAST IN FEMALE, ESTROGEN RECEPTOR POSITIVE: Primary | ICD-10-CM

## 2024-12-17 RX ORDER — ANASTROZOLE 1 MG/1
1 TABLET ORAL DAILY
Qty: 90 TABLET | Refills: 3 | Status: SHIPPED | OUTPATIENT
Start: 2024-12-17

## 2024-12-30 LAB — NCCN CRITERIA FLAG: ABNORMAL

## 2024-12-31 ENCOUNTER — DOCUMENTATION (OUTPATIENT)
Dept: GENETICS | Facility: HOSPITAL | Age: 69
End: 2024-12-31
Payer: MEDICARE

## 2024-12-31 DIAGNOSIS — F41.9 ANXIETY: ICD-10-CM

## 2024-12-31 RX ORDER — ESCITALOPRAM OXALATE 10 MG/1
15 TABLET ORAL EVERY MORNING
Qty: 146 TABLET | Refills: 1 | Status: SHIPPED | OUTPATIENT
Start: 2024-12-31

## 2024-12-31 NOTE — PROGRESS NOTES
This patient recently completed the CARE risk assessment for a mammogram appointment. Based on the patient's responses, NCCN criteria for genetic testing was met.     Navigator follow-up:   Patient underwent genetic counseling and genetic testing in September of 2023. The CancerNext panel was ordered through [employer] which analyzes BRCA1/2 and 34 additional genes associated with an increased cancer risk. Genetic testing was negative for pathogenic mutations in BRCA1/2 and 34 additional genes on the CancerNext panel. No additional testing is indicated at this time.

## 2024-12-31 NOTE — PROGRESS NOTES
Meets NCCN Criteria for Genetic Testing  Declines genetic testing? Y   Reason for decline? Previous Testing   If Reason for Decline is Previous Testing    [place] CARE N   Non-CARE Y   Non-CARE Confirmation    Navigator Confirmed? Y   Patient Reported?     Elevated Tyrer-Cuzick Score ? Or Equal to 20%    Declines referral?     Reason for decline?

## 2025-01-02 ENCOUNTER — HOSPITAL ENCOUNTER (OUTPATIENT)
Dept: MAMMOGRAPHY | Facility: HOSPITAL | Age: 70
Discharge: HOME | End: 2025-01-02
Admitting: RADIOLOGY
Payer: MEDICARE

## 2025-01-02 DIAGNOSIS — R92.8 ABNORMAL MAMMOGRAM: ICD-10-CM

## 2025-01-02 PROCEDURE — 77065 DX MAMMO INCL CAD UNI: CPT

## 2025-01-02 PROCEDURE — 77065 DX MAMMO INCL CAD UNI: CPT | Performed by: RADIOLOGY

## 2025-01-02 PROCEDURE — G0279 TOMOSYNTHESIS, MAMMO: HCPCS

## 2025-01-02 PROCEDURE — G0279 TOMOSYNTHESIS, MAMMO: HCPCS | Performed by: RADIOLOGY

## 2025-01-09 ENCOUNTER — TELEPHONE (OUTPATIENT)
Dept: RADIATION ONCOLOGY | Facility: HOSPITAL | Age: 70
End: 2025-01-09
Payer: MEDICARE

## 2025-01-09 NOTE — TELEPHONE ENCOUNTER
As Dr. Hussein will now be located at [facility], called patient to confirm appointment location change.  Patient did not wish to change location, but would like to remain at [facility].  Appointment date& time change to 1/31/25 at 9:00 am with Dr. Hussein was offered, but patient is requesting an afternoon appointment.  As patient did wish to push appointment out too far, 1 year F/U appointment was scheduled to remain on Friday, 1/24/25 with time change to 3:00 pm with Lelo FABIAN.  Patient is agreeable to this change as she does not have Chemo at  for treatment of Lymphoma on Fridays and verbalized an understanding of date, time, and location of appointment.  Provided contact information and patient will call with questions or concerns should they arise.

## 2025-01-23 ENCOUNTER — TELEPHONE (OUTPATIENT)
Dept: RADIATION ONCOLOGY | Facility: HOSPITAL | Age: 70
End: 2025-01-23
Payer: MEDICARE

## 2025-01-23 NOTE — TELEPHONE ENCOUNTER
Returned patient's call to cancel F/U with Lelo on 1/24/25 as she is admitted to  Hospital.  Provided contact number and patient will call and r/s F/U when she has been discharged from .

## 2025-01-24 ENCOUNTER — READMISSION MANAGEMENT (OUTPATIENT)
Dept: CALL CENTER | Facility: HOSPITAL | Age: 70
End: 2025-01-24
Payer: MEDICARE

## 2025-01-24 ENCOUNTER — HOSPITAL ENCOUNTER (OUTPATIENT)
Dept: RADIATION ONCOLOGY | Facility: HOSPITAL | Age: 70
Setting detail: RADIATION/ONCOLOGY SERIES
Discharge: HOME | End: 2025-01-24
Payer: MEDICARE

## 2025-01-25 NOTE — OUTREACH NOTE
Prep Survey      Flowsheet Row Responses   [facility] facility patient discharged from? Non-[facility]   Is LACE score < 7 ? Non-BH Discharge   Eligibility [facility]   Date of Admission 01/18/25   Date of Discharge 01/24/25   Discharge diagnosis Diffuse large B-cell lymphoma of solid organ excluding spleen   Does the patient have one of the following disease processes/diagnoses(primary or secondary)? Other   Prep survey completed? Yes            Angelic LARIOS - Registered Nurse

## 2025-01-27 ENCOUNTER — TRANSITIONAL CARE MANAGEMENT TELEPHONE ENCOUNTER (OUTPATIENT)
Dept: CALL CENTER | Facility: HOSPITAL | Age: 70
End: 2025-01-27
Payer: MEDICARE

## 2025-01-27 NOTE — OUTREACH NOTE
Call Center TCM Note      Flowsheet Row Responses   [facility] patient discharged from? Non-  [[facility]]   Does the patient have one of the following disease processes/diagnoses(primary or secondary)? Other   TCM attempt successful? 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   Person spoke with today (if not patient) and relationship Patient   Meds reviewed with patient/caregiver? Yes  [New: levaquin]   Does the patient have all medications ordered at discharge? Yes   Is the patient taking all medications as directed (includes completed medication regime)? Yes   Comments PCP Radha TRIPATHI. Patient has an office visit already in place prior to call for 2/6  1130am. Patient wishes to keep this appt.   Does the patient have an appointment with their PCP within 7-14 days of discharge? Other   Nursing Interventions Confirmed date/time of appointment, Routed TCM call to PCP office   Has home health visited the patient within 72 hours of discharge? N/A   Psychosocial issues? No   Comments Patient reports wound care / dressing change to [facility] site twice a day. She states that her daughter is doing her wound care.   Did the patient receive a copy of their discharge instructions? Yes   Nursing interventions Reviewed instructions with patient   What is the patient's perception of their health status since discharge? Same   Is the patient/caregiver able to teach back signs and symptoms related to disease process for when to call PCP? Yes   Is the patient/caregiver able to teach back the hierarchy of who to call/visit for symptoms/problems? PCP, Specialist, Home health nurse, Urgent Care, ED, 911 Yes   If the patient is a current smoker, are they able to teach back resources for cessation? Not a smoker   TCM call completed? Yes   Call end time 1226   Would this patient benefit  from a Referral to [facility] Social Work? No   Is the patient interested in additional calls from an ambulatory ? No            Paradise Talavera RN    1/27/2025, 12:28 EST

## 2025-02-03 ENCOUNTER — TELEPHONE (OUTPATIENT)
Dept: RADIATION ONCOLOGY | Facility: HOSPITAL | Age: 70
End: 2025-02-03
Payer: MEDICARE

## 2025-02-03 NOTE — TELEPHONE ENCOUNTER
Returned patient's call & as she has now been released from [facility], re-scheduled 1 year F/U with Lelo on Friday, 3/7/25 at 2:00 pm.  Patient has a Med/Onc appointment with Eileen at 1:15 pm and will come down directly after.  Patient verbalized an understanding of date, time, and location of appointment.  Patient has my contact number and will call with questions or concerns should they arise.

## 2025-02-25 DIAGNOSIS — Z13.220 LIPID SCREENING: ICD-10-CM

## 2025-02-25 DIAGNOSIS — E11.9 TYPE 2 DIABETES MELLITUS WITHOUT COMPLICATION, WITHOUT LONG-TERM CURRENT USE OF INSULIN: ICD-10-CM

## 2025-02-25 DIAGNOSIS — E53.8 B12 DEFICIENCY: Primary | ICD-10-CM

## 2025-02-25 DIAGNOSIS — E03.9 ACQUIRED HYPOTHYROIDISM: ICD-10-CM

## 2025-02-25 DIAGNOSIS — E55.9 VITAMIN D DEFICIENCY: ICD-10-CM

## 2025-02-26 ENCOUNTER — LAB (OUTPATIENT)
Dept: LAB | Facility: HOSPITAL | Age: 70
End: 2025-02-26
Payer: MEDICARE

## 2025-02-26 DIAGNOSIS — E11.9 TYPE 2 DIABETES MELLITUS WITHOUT COMPLICATION, WITHOUT LONG-TERM CURRENT USE OF INSULIN: ICD-10-CM

## 2025-02-26 DIAGNOSIS — Z13.220 LIPID SCREENING: ICD-10-CM

## 2025-02-26 DIAGNOSIS — E53.8 B12 DEFICIENCY: ICD-10-CM

## 2025-02-26 DIAGNOSIS — E55.9 VITAMIN D DEFICIENCY: ICD-10-CM

## 2025-02-26 DIAGNOSIS — E03.9 ACQUIRED HYPOTHYROIDISM: ICD-10-CM

## 2025-02-26 LAB
25(OH)D3 SERPL-MCNC: 69.4 NG/ML (ref 30–100)
ALBUMIN SERPL-MCNC: 3.7 G/DL (ref 3.5–5.2)
ALBUMIN/GLOB SERPL: 1.3 G/DL
ALP SERPL-CCNC: 83 U/L (ref 39–117)
ALT SERPL W P-5'-P-CCNC: 18 U/L (ref 1–33)
ANION GAP SERPL CALCULATED.3IONS-SCNC: 12 MMOL/L (ref 5–15)
AST SERPL-CCNC: 33 U/L (ref 1–32)
BILIRUB SERPL-MCNC: 0.8 MG/DL (ref 0–1.2)
BUN SERPL-MCNC: 16 MG/DL (ref 8–23)
BUN/CREAT SERPL: 29.6 (ref 7–25)
CALCIUM SPEC-SCNC: 10 MG/DL (ref 8.6–10.5)
CHLORIDE SERPL-SCNC: 108 MMOL/L (ref 98–107)
CHOLEST SERPL-MCNC: 99 MG/DL (ref 0–200)
CO2 SERPL-SCNC: 22 MMOL/L (ref 22–29)
CREAT SERPL-MCNC: 0.54 MG/DL (ref 0.57–1)
EGFRCR SERPLBLD CKD-EPI 2021: 101.1 ML/MIN/1.73
GLOBULIN UR ELPH-MCNC: 2.9 GM/DL
GLUCOSE SERPL-MCNC: 101 MG/DL (ref 65–99)
HBA1C MFR BLD: 4.7 % (ref 4.8–5.6)
HDLC SERPL-MCNC: 44 MG/DL (ref 40–60)
LDLC SERPL CALC-MCNC: 27 MG/DL (ref 0–100)
LDLC/HDLC SERPL: 0.45 {RATIO}
POTASSIUM SERPL-SCNC: 4.1 MMOL/L (ref 3.5–5.2)
PROT SERPL-MCNC: 6.6 G/DL (ref 6–8.5)
SODIUM SERPL-SCNC: 142 MMOL/L (ref 136–145)
T3FREE SERPL-MCNC: 2.37 PG/ML (ref 2–4.4)
T4 FREE SERPL-MCNC: 1.37 NG/DL (ref 0.92–1.68)
TRIGL SERPL-MCNC: 175 MG/DL (ref 0–150)
TSH SERPL DL<=0.05 MIU/L-ACNC: 0.71 UIU/ML (ref 0.27–4.2)
VIT B12 BLD-MCNC: 1753 PG/ML (ref 211–946)
VLDLC SERPL-MCNC: 28 MG/DL (ref 5–40)

## 2025-02-26 PROCEDURE — 83036 HEMOGLOBIN GLYCOSYLATED A1C: CPT

## 2025-02-26 PROCEDURE — 84481 FREE ASSAY (FT-3): CPT

## 2025-02-26 PROCEDURE — 80061 LIPID PANEL: CPT

## 2025-02-26 PROCEDURE — 82306 VITAMIN D 25 HYDROXY: CPT

## 2025-02-26 PROCEDURE — 84443 ASSAY THYROID STIM HORMONE: CPT

## 2025-02-26 PROCEDURE — 80053 COMPREHEN METABOLIC PANEL: CPT

## 2025-02-26 PROCEDURE — 84439 ASSAY OF FREE THYROXINE: CPT

## 2025-02-26 PROCEDURE — 82607 VITAMIN B-12: CPT

## 2025-03-07 ENCOUNTER — HOSPITAL ENCOUNTER (OUTPATIENT)
Dept: RADIATION ONCOLOGY | Facility: HOSPITAL | Age: 70
Setting detail: RADIATION/ONCOLOGY SERIES
Discharge: HOME | End: 2025-03-07
Payer: MEDICARE

## 2025-03-07 ENCOUNTER — OFFICE VISIT (OUTPATIENT)
Dept: INTERNAL MEDICINE | Facility: CLINIC | Age: 70
End: 2025-03-07
Payer: MEDICARE

## 2025-03-07 ENCOUNTER — OFFICE VISIT (OUTPATIENT)
Dept: RADIATION ONCOLOGY | Facility: HOSPITAL | Age: 70
End: 2025-03-07
Payer: MEDICARE

## 2025-03-07 ENCOUNTER — OFFICE VISIT (OUTPATIENT)
Dept: ONCOLOGY | Facility: CLINIC | Age: 70
End: 2025-03-07
Payer: MEDICARE

## 2025-03-07 VITALS
BODY MASS INDEX: 28.35 KG/M2 | TEMPERATURE: 98.7 F | HEIGHT: 63 IN | WEIGHT: 160 LBS | HEART RATE: 129 BPM | SYSTOLIC BLOOD PRESSURE: 97 MMHG | OXYGEN SATURATION: 98 % | RESPIRATION RATE: 20 BRPM | DIASTOLIC BLOOD PRESSURE: 64 MMHG

## 2025-03-07 VITALS
DIASTOLIC BLOOD PRESSURE: 60 MMHG | HEIGHT: 63 IN | WEIGHT: 159.6 LBS | OXYGEN SATURATION: 99 % | TEMPERATURE: 97.8 F | SYSTOLIC BLOOD PRESSURE: 90 MMHG | HEART RATE: 102 BPM | BODY MASS INDEX: 28.28 KG/M2

## 2025-03-07 DIAGNOSIS — C50.412 MALIGNANT NEOPLASM OF UPPER-OUTER QUADRANT OF LEFT BREAST IN FEMALE, ESTROGEN RECEPTOR POSITIVE: Primary | ICD-10-CM

## 2025-03-07 DIAGNOSIS — E03.9 ACQUIRED HYPOTHYROIDISM: ICD-10-CM

## 2025-03-07 DIAGNOSIS — R11.0 NAUSEA: ICD-10-CM

## 2025-03-07 DIAGNOSIS — Z17.0 MALIGNANT NEOPLASM OF UPPER-OUTER QUADRANT OF LEFT BREAST IN FEMALE, ESTROGEN RECEPTOR POSITIVE: Primary | ICD-10-CM

## 2025-03-07 DIAGNOSIS — F41.9 ANXIETY: ICD-10-CM

## 2025-03-07 DIAGNOSIS — E11.9 TYPE 2 DIABETES MELLITUS WITHOUT COMPLICATION, WITHOUT LONG-TERM CURRENT USE OF INSULIN: Primary | ICD-10-CM

## 2025-03-07 DIAGNOSIS — C83.398 DIFFUSE LARGE B-CELL LYMPHOMA OF SOLID ORGAN EXCLUDING SPLEEN: ICD-10-CM

## 2025-03-07 DIAGNOSIS — C50.412 MALIGNANT NEOPLASM OF UPPER-OUTER QUADRANT OF LEFT BREAST IN FEMALE, ESTROGEN RECEPTOR POSITIVE: ICD-10-CM

## 2025-03-07 DIAGNOSIS — Z17.0 MALIGNANT NEOPLASM OF UPPER-OUTER QUADRANT OF LEFT BREAST IN FEMALE, ESTROGEN RECEPTOR POSITIVE: ICD-10-CM

## 2025-03-07 DIAGNOSIS — R92.8 ABNORMAL MAMMOGRAM: ICD-10-CM

## 2025-03-07 PROBLEM — C83.30 DIFFUSE LARGE B CELL LYMPHOMA: Status: ACTIVE | Noted: 2025-03-07

## 2025-03-07 PROCEDURE — 99214 OFFICE O/P EST MOD 30 MIN: CPT | Performed by: NURSE PRACTITIONER

## 2025-03-07 PROCEDURE — 1126F AMNT PAIN NOTED NONE PRSNT: CPT | Performed by: NURSE PRACTITIONER

## 2025-03-07 PROCEDURE — 1160F RVW MEDS BY RX/DR IN RCRD: CPT | Performed by: NURSE PRACTITIONER

## 2025-03-07 PROCEDURE — G0463 HOSPITAL OUTPT CLINIC VISIT: HCPCS

## 2025-03-07 PROCEDURE — 1159F MED LIST DOCD IN RCRD: CPT | Performed by: NURSE PRACTITIONER

## 2025-03-07 RX ORDER — VALACYCLOVIR HYDROCHLORIDE 500 MG/1
500 TABLET, FILM COATED ORAL 2 TIMES DAILY
Qty: 14 TABLET | Refills: 4 | Status: SHIPPED | OUTPATIENT
Start: 2025-03-07

## 2025-03-07 RX ORDER — ATORVASTATIN CALCIUM 10 MG/1
10 TABLET, FILM COATED ORAL DAILY
COMMUNITY
Start: 2024-12-20

## 2025-03-07 RX ORDER — ONDANSETRON 8 MG/1
8 TABLET, ORALLY DISINTEGRATING ORAL EVERY 8 HOURS PRN
Qty: 30 TABLET | Refills: 3 | Status: SHIPPED | OUTPATIENT
Start: 2025-03-07

## 2025-03-07 RX ORDER — ESCITALOPRAM OXALATE 5 MG/1
5 TABLET ORAL DAILY
Qty: 90 TABLET | Refills: 1 | Status: SHIPPED | OUTPATIENT
Start: 2025-03-07

## 2025-03-07 NOTE — PROGRESS NOTES
Follow Up Office Visit      Encounter Date: 03/07/2025   Patient Name: Justine Fernandes  YOB: 1955   Medical Record Number: [id number]   Primary Diagnosis: Malignant neoplasm of upper-outer quadrant of left breast in female, estrogen receptor positive [C50.412, Z17.0]   Cancer Staging:   [id number]      Chief Complaint:        Chief Complaint   Patient presents with    Breast Cancer          Oncologic History: Justine Fernandes is a 69 y.o. female who is here for annual follow up visit regarding her left breast invasive ductal carcinoma (pT2N0, +/+/- )     She sought medical evaluation for a palpable left breast mass. She has a history of a prior left exicisional biopsy with benign pathology.  8/22/23 - Diagnostic mammogram/US -  irregular isodense mass with ill-defined and spiculated borders present in the posterior approximately 3:00 position of the left breast. Several stable IM nodes noted. Evidence of prior surgery in mid UOQ  8/22 US guided biopsy - grade 2 invasive ductal carcinoma, +/+/-   9/18 - breast MRI - known malignancy identified in left breast, no other findings in either breast concerning for malignancy. Stable IM nodes and reactive appearing axillary LN  10/10 - left lumpectomy and SLN evaluation (Dr. Janis French) - grade 2, 2.7 cm IDC with focal DCIS. Extended margins negative. 0/1 SLN involved. +/+/-. pT2N0  Oncotype score 18 - Adjuvant chemo not recommended.  Genetics testing  - Negative.    She completed adjuvant RT directed towards the left breast (40.05 Gy/15 fractions), completing 12/20/2023.    Diagnostic mammogram (bilateral - 7/1/2024): Interpreted as BI-RADS Category 3, probably benign, with presumed postsurgical changes on the left and no mammographic findings worrisome for malignancy in either breast    Diagnostic mammogram (left - 1/2/2025): Did as BI-RADS Category 2, benign, with stable postsurgical changes on the left and no  "mammographic findings worrisome for malignancy.      Interval History: Returns for scheduled f/u visit. Since she was last seen in clinic, she was diagnosed with stage I-E DLBLC from a liver biopsy and started on first-line chemo with R-CHOP x 4 cycles under the direction of  hematology oncology. She was noted to have excellent response to treatment on subsequent imaging and has further eval with PET/CT scan scheduled for 4/7/2025. Currently, she is struggling with taste changes, anorexia, weight loss and fatigue and is hoping to have IV fluids scheduled later today at  which generally makes her feel better. She denies B-symptoms of fever, chills, night sweats.    From a breast cancer standpoint, she denies breast specific complaints.  Denies palpable lumps/bumps, skin changes, nipple discharge, or breast pain bilaterally.  Notes nipple inversion following RT on the left. Denies lymphedema or decreased ROM of the LUE. Continues adjuvant anastrozole and denies hot flashes or notable side effects.     Prior Radiation: Adjuvant RT to L breast (40.05 Gy/15 fr)  Completion Date: 12/20/2023        Subjective      Review of Systems: Review of Systems   Constitutional:  Positive for appetite change, fatigue and unexpected weight change.        Taste change  Notes issues 2/2 chemo   Gastrointestinal:  Positive for nausea.   Musculoskeletal:         Generalized weakness   All other systems reviewed and are negative.      The following portions of the patient's history were reviewed and updated as appropriate: allergies, current medications, past family history, past medical history, past social history, past surgical history and problem list.    Measures:   KPS/Quality of Life: 80 - Restricted Physical Activity      Objective     Physical Exam:   BP 97/64 Comment: RUE  Pulse (!) 129  Temp 98.7 °F (37.1 °C) (Temporal)  Resp 20  Ht 160 cm (63\")  Wt 72.6 kg (160 lb)  SpO2 98% Comment: RA  BMI 28.34 kg/m² "     Constitutional: No acute distress, sitting comfortably.   Eye: EOMI, anicteric sclerae  HENT: NC/AT, MMM. Alopecia.  Respiratory: Symmetric expansion, nonlabored respiration  MSK: ROM intact in all four extremities, no obvious deformities  Neuro: Alert, oriented x3, CN3-12 grossly intact.   Psych: Appropriate mood and affect.  Breasts: Symmetric breasts bilaterally. Skin appears healthy, nonerythematous. Left breast with healed surgical scars. No concerning palpable breast nodules or masses bilaterally. Left nipple inverted. No palpable IM, SCV, or axillary adenopathy bilaterally.    Results:   Imaging: Images were reviewed personally and pertinent findings are detailed below.     Narrative & Impression   LEFT DIGITAL DIAGNOSTIC MAMMOGRAM WITH TOMOSYNTHESIS  1/2/2025     IMPRESSION:  Stable postsurgical changes on the left; no mammographic  findings worrisome for malignancy.     ACR BI-RADS CATEGORY: 2, BENIGN          Assessment / Plan        Assessment/Plan:     Diagnoses and all orders for this visit:    1. Malignant neoplasm of upper-outer quadrant of left breast in female, estrogen receptor positive (Primary)        Justine Fernandes is a pleasant 69 y.o. female with history of a pT2N0 +/+/- invasive ductal carcinoma of the left breast managed with a lumpectomy and SLN evaluation on 10/10/2023. Her tumor was resected with negative margins. She completed post lumpectomy RT (40.05 Gy/15 fractions) in 12/2023. She is without clinical or radiographic evidence for recurrent disease. She is on adjuvant AI with anastrozole. Her next surveillance mammogram has been previously ordered and will be performed in 7/2025. She continues routine oncologic surveillance under the care of Dr. Hernandez in the breast cancer survivorship clinic, and additionally continues to be seen by Dr. NEIL in the surgical oncology breast clinic. We will schedule follow up in 1 year in our department, or sooner as needed.    In the interim, she  will continue follow up with her  hematology oncology team regarding her more recently diagnosed Stage I-E DLBCL. She has had an excellent response to first-line R-CHOP (s/p 4 cycles) and is reportedly unaware of any further plans for additional chemo or RT. We will defer further recommendations to her team at  and will be happy to remain available sooner than the 1-year roslyn if need be.      Follow Up:   Return in about 1 year (around 3/7/2026) for Office Visit.        Time:   I spent 40 minutes on this encounter today, 03/07/25. Activities that took place during this time include:   - preparing to see the patient  - obtaining and reviewing separately obtained history  - performing a medically appropriate examination and evaluation  - counseling and educating the patient  - ordering medications/tests/procedures  - communicating with other healthcare providers  - documenting clinical information in the health record  - coordinating care for this patient.     Sincerely,    CAREN Vidal, DNP, FNP-C  Radiation Oncology  This document has been signed by CAREN Bernstein on March 7, 2025 14:56 EST           NOTICE TO PATIENTS  At [facility], we believe that sharing information builds trust and better relationships. You are receiving this note because you recently visited [facility]. It is possible you will see health information before a provider has talked with you about it. This kind of information can be easy to misunderstand. To help you fully understand what it means for your health, we urge you to discuss this note with your provider.

## 2025-03-07 NOTE — PROGRESS NOTES
"      PROBLEM LIST:  pT2 N0 M0 ER positive (100%, 3+) VT positive (50%, 2-3+) HER2 negative (1+) invasive ductal carcinoma of the left breast  Left breast lumpectomy on 10/10/2023.  Pathology showed a 2.7 cm intermediate grade invasive ductal carcinoma.  0 of 1 sentinel lymph node involved.  Oncotype score 18.  Radiation completed 12/20/2023.  Anastrozole started 1/5/2024  Anxiety  Cirrhosis  Diabetes  Hyperlipidemia  Hypertrophic obstructive cardiomyopathy  Hypothyroidism  Osteopenia  Sleep apnea    Subjective     CHIEF COMPLAINT: breast cancer    HISTORY OF PRESENT ILLNESS:   Justine Fernandes returns for follow-up.  She completed adjuvant radiotherapy to the left breast 12/20/2023.  She restarted anastrozole September 2024.  She is tolerating that relatively well.    She was diagnosed with diffuse large B-cell lymphoma from a liver biopsy on 11/12/2024 at [facility].  She completed 4 cycles of R-CHOP with last dose given 2/24/2025.  Patient reports she will need no further R-CHOP currently.  She is supposed to have a repeat PET/CT scan at [facility] 6 to 8 weeks after completion of treatment.  Treatment was complicated by an infected port.  Port was removed and she completed treatment with a PICC line.  She no longer has a PICC line in place.    She states over the last week since completing cycle 4 of R-CHOP she has had taste changes, poor appetite, not eating or drinking well weakness and some nausea.      Objective      BP 97/64 Comment: RUE  Pulse (!) 129   Temp 98.7 °F (37.1 °C) (Temporal)   Resp 20   Ht 160 cm (63\")   Wt 72.6 kg (160 lb)   SpO2 98% Comment: RA  BMI 28.34 kg/m²   Vitals:    03/07/25 1309   PainSc: 0-No pain               ECOG score: 1     General: well appearing female in no acute distress  Neuro: alert and oriented  HEENT: sclerae anicteric, oropharynx clear  Extremities: no lower extremity edema  Skin: no rashes, lesions, bruising, or " petechiae  Psych: mood and affect appropriate          RECENT LABS:  Lab Results   Component Value Date    WBC 2.32 (L) 02/24/2025    HGB 10.7 (L) 02/24/2025    HCT 32.1 (L) 02/24/2025    MCV 97 02/24/2025     02/24/2025       Lab Results   Component Value Date    GLUCOSE 101 (H) 02/26/2025    BUN 16 02/26/2025    CREATININE 0.54 (L) 02/26/2025    EGFRIFNONA 72 12/04/2019    EGFRIFAFRI 96 09/28/2022    BCR 29.6 (H) 02/26/2025    K 4.1 02/26/2025    CO2 22.0 02/26/2025    CALCIUM 10.0 02/26/2025    ALBUMIN 3.7 02/26/2025    AST 33 (H) 02/26/2025    ALT 18 02/26/2025               ASSESSMENT AND PLAN:     Justine Fernandes is a 69 y.o. female with a T2N0M0 ER+ Her2 negative IDC of the left breast.     Radiation completed 12/20/2023.  Anastrozole started 1/5/2024.  She took it for about 5 months, and then stopped it because of fatigue.  She restarted the anastrozole September 2024 and is tolerating it relatively well.    Mammogram left diagnostic 1/2/2025 BI-RADS Category 2, benign.  Order placed for bilateral screening diagnostic mammogram due July 2025.    Osteopenia: she had a dexa scan 8/20/23.  Plan to repeat in 2 years.  Encouraged vitamin D supplement and weightbearing exercise such as walking 30 minutes 5 days a week.    New diagnosis of diffuse large B-cell lymphoma per liver biopsy 11/12/2024.  Treated by oncology at [facility].  Has completed 4 cycles of R-CHOP with last dose given 2/24/2025.  I reviewed with patient she should follow-up with her primary oncology team at  to discuss ongoing symptoms.  She may need outpatient IV fluids since she is not eating and drinking well.    Follow-up in 6 months.              I spent 32 minutes caring for Justine on this date of service. This time includes time spent by me in the following activities: preparing for the visit, performing a medically appropriate examination and/or evaluation, counseling and educating the  patient/family/caregiver, documenting information in the medical record, ordering test(s), and obtaining a separately obtained history        Tish Chappell APRN  [facility]    3/7/2025          CC:

## 2025-03-07 NOTE — PROGRESS NOTES
Office Note     Name: Justine Fernandes    : 1955     MRN: [id number]     Chief Complaint  Hypotension (6 months follow-up)    Subjective     History of Present Illness:  Justine Fenrandes is a 69 y.o. female who presents today for 6 MONTH follow-up.  Past medical history significant for B-cell lymphoma, history of breast cancer, hypertension, hyperlipidemia, hypothyroidism, type II diabetes, congenital heart defect, and anxiety/depression.    History of Present Illness  The patient presents for evaluation of large B-cell lymphoma, nausea, urinary incontinence, and depression.    She was diagnosed with large B-cell lymphoma following an ultrasound in 2024, which revealed an abnormality in her liver. A subsequent CT-guided biopsy confirmed the diagnosis. She completed her last chemotherapy session under the [facility] protocol last week, having undergone 4 out of the initially planned 6 treatments. A PET scan conducted in 2025 showed no evidence of disease. She reports experiencing frequent nausea, fatigue, and cold sensations. She was hospitalized for a week due to an infected port, which required removal and daily gauze changes. She has lost approximately 30 pounds and reports a lack of appetite and altered taste perception. She has been using cannabis wafers to manage her nausea and stimulate her appetite, which she finds effective. She has been advised to increase her fluid intake. She lives alone but currently has her daughter living with her to assist with wound care and PICC line management. She continues to drive but reports limited mobility due to bowel and bladder incontinence, which she attributes to her chemotherapy. She has not experienced any falls. She has not been engaging in physical exercise but plans to resume swimming once her PICC line heals. She has a PET scan scheduled for 2025 and a follow-up appointment with her oncologist, Dr. Richmond, a week later.    She has been using  cannabis wafers to manage her nausea and stimulate her appetite, which she finds effective. She has antinausea medication at home but reports it is ineffective.    She reports limited mobility due to bowel and bladder incontinence, which she attributes to her chemotherapy. She has a history of bladder issues and plans to consult a urologist. She experiences stool leakage post-chemotherapy, which gradually improves until the next session. She does not want to take any medication for constipation. She does not monitor her fiber intake and consumes whatever food she can tolerate.    She is currently on Lexapro 10 mg and has been advised to increase the dosage to 1.5 tablets. She reports feeling mentally down, which she believes is a combination of her chemotherapy, seasonal changes, and incontinence.    Supplemental Information  She had a consultation with a cardiologist who prescribed medication for cholesterol management due to potential cardiac damage.    SOCIAL HISTORY  She lives alone but currently has her daughter living with her to assist with wound care and PICC line management.    FAMILY HISTORY  Her sister  of breast cancer and her brother  of rectal cancer.    MEDICATIONS  Current: Lexapro, metformin, albuterol, vitamin D, Zofran    Review of Systems:   Review of Systems   Constitutional:  Positive for unexpected weight loss. Negative for activity change, appetite change, diaphoresis, fatigue and unexpected weight gain.   HENT:  Negative for hearing loss.    Eyes:  Negative for blurred vision, double vision and visual disturbance.   Respiratory:  Negative for chest tightness and shortness of breath.    Cardiovascular:  Negative for chest pain, palpitations and leg swelling.   Gastrointestinal:  Negative for abdominal pain, blood in stool, GERD and indigestion.   Endocrine: Negative for cold intolerance and heat intolerance.   Genitourinary:  Negative for dysuria and hematuria.   Musculoskeletal:   Negative for arthralgias and myalgias.   Skin:  Negative for skin lesions.   Neurological:  Negative for tremors, seizures, syncope, speech difficulty, weakness, headache, memory problem and confusion.   Hematological:  Does not bruise/bleed easily.   Psychiatric/Behavioral:  Negative for sleep disturbance and depressed mood. The patient is not nervous/anxious.        Past Medical History:   Past Medical History:   Diagnosis Date    Anxiety     Asthma     Mild     Breast cancer 10/10/2023    Left Breast    Bundle branch block     Carpal tunnel syndrome, Right     Circulation disorder of lower extremity     legs    Cirrhosis, nonalcoholic     Colon polyps     Congenital heart defect     Diabetes mellitus     Drug therapy 12/09/2024    chemo for lymphoma    Elevated cholesterol     Hx of radiation therapy     late 2023, L breast    Hyperlipidemia     Hypertrophic obstructive cardiomyopathy     Hypothyroid     Leukopenia     Melanocytic nevus of trunk     Osteopenia     Paresthesia of hand     Routine medical exam 06/30/2023    Senile hyperkeratosis     Sensorineural hearing loss     Sleep apnea     CPAP nightly    Steatosis of liver     Stress incontinence        Past Surgical History:   Past Surgical History:   Procedure Laterality Date    BLADDER SURGERY  1973    BREAST BIOPSY Left 2023    u/s guided bx    BREAST CYST EXCISION Left 2000    excisional biopsy    BREAST LUMPECTOMY Left 10/10/2023    Procedure: BREAST LUMPECTOMY WITH NEEDLE LOCALIZATION, SENTINEL BIOPSY LEFT;  Surgeon: Chin Carey MD;  Location: [facility];  Service: General;  Laterality: Left;    CARPAL TUNNEL RELEASE  2018    right     COLONOSCOPY      KIDNEY STONE SURGERY  1994    KNEE SURGERY  2019    meniscus repair- left knee    NASAL POLYP SURGERY  2006 1999    NASAL SEPTUM SURGERY  1999    POSTERIOR VAGINAL REPAIR N/A 09/25/2024    Procedure: POSTERIOR REPAIR RECTOCELE COLPORRHAPHY, REPAIR ENTEROCELE;  Surgeon: Jackie Wen  MD Chacha;  Location: [facility];  Service: Gynecology;  Laterality: N/A;    REPLACEMENT TOTAL HIP LATERAL POSITION Right 10/02/2020    TONSILLECTOMY AND ADENOIDECTOMY      TUBAL ABDOMINAL LIGATION      VAGINAL REPAIR  1976    VARICOSE VEIN SURGERY  2006    Right leg     WISDOM TOOTH EXTRACTION         Family History:   Family History   Problem Relation Age of Onset    Breast cancer Mother 80    Heart disease Mother     Cancer Mother     Colon cancer Mother     Arthritis Mother     Angina Mother     Osteoporosis Mother     Hyperlipidemia Mother     Stroke Father     Colon cancer Father     Heart attack Father     Lung cancer Father     Cancer Sister     Migraines Sister     Breast cancer Sister 66    Cancer Sister     Cancer Brother     Arthritis Brother     Hypertension Brother     Cancer Brother     Diabetes Brother     Breast cancer Maternal Cousin 42       Social History:   Social History     Socioeconomic History    Marital status:    Tobacco Use    Smoking status: Never    Smokeless tobacco: Never   Vaping Use    Vaping status: Never Used   Substance and Sexual Activity    Alcohol use: Never    Drug use: Never    Sexual activity: Defer       Immunizations:   Immunization History   Administered Date(s) Administered    COVID-19 (MODERNA) 1st,2nd,3rd Dose Monovalent 03/21/2021, 04/18/2021    Fluzone (or Fluarix & Flulaval for VFC) >6mos 01/05/2015, 11/09/2015, 11/22/2016    Hepatitis B 08/09/2022, 09/07/2022    Hepatitis B Adult/Adolescent IM 03/29/2023    Influenza, Unspecified 01/05/2015, 11/09/2015, 11/22/2016    Shingrix 08/14/2018, 03/21/2019    Tdap 03/27/2009    Zostavax 12/05/2016        Medications:     Current Outpatient Medications:     albuterol sulfate  (90 Base) MCG/ACT inhaler, Inhale 2 puffs Every 6 (Six) Hours As Needed for Wheezing., Disp: 18 g, Rfl: 3    ammonium lactate (AMLACTIN) 12 % cream, As Needed., Disp: , Rfl:     anastrozole (ARIMIDEX) 1 MG tablet, TAKE 1 TABLET EVERY  "DAY, Disp: 90 tablet, Rfl: 3    escitalopram (LEXAPRO) 10 MG tablet, Take 1.5 tablets by mouth Every Morning., Disp: 146 tablet, Rfl: 1    Fluocinolone Acetonide Scalp 0.01 % oil, , Disp: , Rfl:     glucose blood test strip, Use as instructed, Disp: 100 each, Rfl: 12    glucose monitor monitoring kit, Use 1 each Daily., Disp: 1 each, Rfl: 0    Lancets (freestyle) lancets, daily, Disp: 100 each, Rfl: 12    Mavacamten (Camzyos) 2.5 MG capsule, , Disp: , Rfl:     Menthol-Zinc Oxide (Calmoseptine) 0.44-20.6 % ointment, Apply 1 application  topically to the appropriate area as directed 2 (Two) Times a Day., Disp: 71 g, Rfl: 3    metFORMIN (GLUCOPHAGE) 500 MG tablet, Take 1 tablet by mouth Daily With Breakfast., Disp: , Rfl:     Synthroid 50 MCG tablet, Take 1 tablet by mouth Daily., Disp: 90 tablet, Rfl: 1    vitamin D3 (Vitamin D) 125 MCG (5000 UT) capsule capsule, Take 1 capsule by mouth Daily., Disp: , Rfl:     escitalopram (LEXAPRO) 5 MG tablet, Take 1 tablet by mouth Daily., Disp: 90 tablet, Rfl: 1    ondansetron ODT (ZOFRAN-ODT) 8 MG disintegrating tablet, Place 1 tablet on the tongue Every 8 (Eight) Hours As Needed for Nausea or Vomiting., Disp: 30 tablet, Rfl: 3    valACYclovir (Valtrex) 500 MG tablet, Take 1 tablet by mouth 2 (Two) Times a Day., Disp: 14 tablet, Rfl: 4    Allergies:   No Known Allergies    Objective     Vital Signs  BP 90/60 (BP Location: Right arm, Patient Position: Sitting, Cuff Size: Adult)   Pulse 102   Temp 97.8 °F (36.6 °C) (Temporal)   Ht 160 cm (62.99\")   Wt 72.4 kg (159 lb 9.6 oz)   SpO2 99%   BMI 28.28 kg/m²   Body mass index is 28.28 kg/m².     BMI 28.       Physical Exam  Vitals reviewed.   Constitutional:       Appearance: Normal appearance. She is well-developed.   HENT:      Head: Normocephalic and atraumatic.      Right Ear: Hearing, tympanic membrane, ear canal and external ear normal.      Left Ear: Hearing, tympanic membrane, ear canal and external ear normal.      Nose: " Nose normal.      Mouth/Throat:      Mouth: Mucous membranes are moist.      Pharynx: Oropharynx is clear. Uvula midline.   Eyes:      General: Lids are normal.      Conjunctiva/sclera: Conjunctivae normal.      Pupils: Pupils are equal, round, and reactive to light.   Cardiovascular:      Rate and Rhythm: Normal rate and regular rhythm.      Heart sounds: Normal heart sounds.   Pulmonary:      Effort: Pulmonary effort is normal.      Breath sounds: Normal breath sounds.   Abdominal:      General: Bowel sounds are normal.      Palpations: Abdomen is soft.   Musculoskeletal:         General: Normal range of motion.      Cervical back: Full passive range of motion without pain, normal range of motion and neck supple.   Skin:     General: Skin is warm and dry.      Coloration: Skin is pale.   Neurological:      Mental Status: She is alert and oriented to person, place, and time.      Deep Tendon Reflexes: Reflexes are normal and symmetric.   Psychiatric:         Speech: Speech normal.         Behavior: Behavior normal.         Thought Content: Thought content normal.         Judgment: Judgment normal.        Procedures     Results:  No results found for this or any previous visit (from the past 24 hours).     Assessment and Plan     Assessment/Plan:  Diagnoses and all orders for this visit:    1. Type 2 diabetes mellitus without complication, without long-term current use of insulin (Primary)  -     metFORMIN (GLUCOPHAGE) 500 MG tablet; Take 1 tablet by mouth Daily With Breakfast.    2. Nausea  -     ondansetron ODT (ZOFRAN-ODT) 8 MG disintegrating tablet; Place 1 tablet on the tongue Every 8 (Eight) Hours As Needed for Nausea or Vomiting.  Dispense: 30 tablet; Refill: 3    3. Acquired hypothyroidism  Overview:  Currently taking levothyroxine 50mg daily.       4. Anxiety  Overview:  Currently taking Lexapro 10mg daily. Will add additional 5mg tablet daily.    Orders:  -     escitalopram (LEXAPRO) 5 MG tablet; Take 1  tablet by mouth Daily.  Dispense: 90 tablet; Refill: 1    5. Diffuse large B-cell lymphoma of solid organ excluding spleen    6. Malignant neoplasm of upper-outer quadrant of left breast in female, estrogen receptor positive  Overview:  pT2 N0 M0 ER positive (100%, 3+) WV positive (50%, 2-3+) HER2 negative (1+) invasive ductal carcinoma of the left breast  Left breast lumpectomy on 10/10/2023.  Pathology showed a 2.7 cm intermediate grade invasive ductal carcinoma.  0 of 1 sentinel lymph node involved.  Oncotype score 18.  She has completed 15 radiation treatments.  Now on anastrozole 1 mg tablets daily.  Follows with oncology.      Other orders  -     valACYclovir (Valtrex) 500 MG tablet; Take 1 tablet by mouth 2 (Two) Times a Day.  Dispense: 14 tablet; Refill: 4        Assessment & Plan  1. Large B-cell lymphoma.  She completed her last chemotherapy session last week. A PET scan in February showed complete metabolic resolution with no evidence of new active disease, although her spleen was slightly enlarged but not suspicious. She reports significant nausea, fatigue, and cold sensitivity. A stronger dose of Zofran 8 mg disintegrating tablets will be prescribed to help manage her nausea. She is advised to continue monitoring her symptoms and report any new or worsening conditions.    2. Nausea.  She experiences significant nausea, which has been partially managed with cannabis wafers. A stronger dose of Zofran 8 mg disintegrating tablets will be prescribed to help manage her nausea.    3. Urinary incontinence.  She reports a lack of control over her bowels and bladder, which she attributes to chemotherapy. She is advised to consult with a specialist for further evaluation and management. Increasing fiber intake with products like Metamucil is recommended to help with bowel control.    4. Herpes.  She has had a recent outbreak of herpes, likely due to immunosuppression from chemotherapy. A prescription for antiviral  medication will be provided to manage the outbreak.    5. Depression.  She reports feeling mentally down, which may be due to a combination of chemotherapy, seasonal changes, and incontinence. Her Lexapro dosage will be increased by an additional 5 mg, which she can take in conjunction with her current 10 mg dose. She is advised to continue this regimen until she feels better and then reassess the need for the additional dosage.    6. Medication management.  Her A1c levels are slightly low, likely due to her treatments and weight loss. Her vitamin D levels are within the normal range. She will reduce her metformin intake to 500 mg once daily, to be taken in the morning. She will continue her vitamin D supplementation every other day.    Follow-up  The patient will follow up in 6 months.    PROCEDURE  The patient had an infected port removed and a PICC line inserted during a week-long hospital stay.    Follow Up  Return in about 6 months (around 9/7/2025).    Patient or patient representative verbalized consent for the use of Ambient Listening during the visit with  Radha Cooney PA-C for chart documentation. 3/7/2025  12:33 EST      Radha Cooney PA-C   [facility] [place] [phone]  Answers submitted by the patient for this visit:  Problem not listed (Submitted on 2/28/2025)  Chief Complaint: Other medical problem  Reason for appointment: Medication renewals  Onset: at an unknown time  Chronicity: chronic  Frequency: daily

## 2025-03-13 ENCOUNTER — READMISSION MANAGEMENT (OUTPATIENT)
Dept: CALL CENTER | Facility: HOSPITAL | Age: 70
End: 2025-03-13
Payer: MEDICARE

## 2025-03-14 ENCOUNTER — TRANSITIONAL CARE MANAGEMENT TELEPHONE ENCOUNTER (OUTPATIENT)
Dept: CALL CENTER | Facility: HOSPITAL | Age: 70
End: 2025-03-14
Payer: MEDICARE

## 2025-03-14 NOTE — OUTREACH NOTE
Call Center TCM Note      Flowsheet Row Responses   [facility] patient discharged from? Non-  []   Does the patient have one of the following disease processes/diagnoses(primary or secondary)? Other   TCM attempt successful? Yes   Call start time 1036   Call end time 1039   Discharge diagnosis Neutropenic fever (CMS/HCC) (Primary Dx),  Diffuse large B-cell lymphoma of solid organ excluding spleen,  Herpes   Person spoke with today (if not patient) and relationship Patient   Meds reviewed with patient/caregiver? Yes   Does the patient have all medications ordered at discharge? Yes   Is the patient taking all medications as directed (includes completed medication regime)? Yes   Comments PCP Radha TRIPATHI. Patient declined need to schedule PCP appt with call today. She states that she will be following up with her oncologist and no needs right now from primary care.   Does the patient have an appointment with their PCP within 7-14 days of discharge? No   Nursing Interventions Patient desires to follow up with specialty only, Routed TCM call to PCP office   Has home health visited the patient within 72 hours of discharge? N/A   Psychosocial issues? No   Did the patient receive a copy of their discharge instructions? Yes   Nursing interventions Reviewed instructions with patient   What is the patient's perception of their health status since discharge? Improving   Is the patient/caregiver able to teach back signs and symptoms related to disease process for when to call PCP? Yes   Is the patient/caregiver able to teach back the hierarchy of who to call/visit for symptoms/problems? PCP, Specialist, Home health nurse, Urgent Care, ED, 911 Yes   If the patient is a current smoker, are they able to teach back resources for cessation? Not a smoker   TCM call completed? Yes   Call end time 1039   Would this patient benefit from a Referral to [facility] Social Work? No   Is the patient interested in additional calls from  an ambulatory ? No            Paradise Talavera RN    3/14/2025, 10:39 EDT

## 2025-03-20 ENCOUNTER — READMISSION MANAGEMENT (OUTPATIENT)
Dept: CALL CENTER | Facility: HOSPITAL | Age: 70
End: 2025-03-20
Payer: MEDICARE

## 2025-03-20 NOTE — OUTREACH NOTE
Prep Survey      Flowsheet Row Responses   [facility]? Non-[facility]   Is LACE score < 7 ? Non- Discharge   [facility]   Date of Admission 03/14/25   Date of Discharge 03/20/25   Discharge diagnosis Fever, unspecified fever cause (Primary Dx),  Clostridium difficile diarrhea,    Does the patient have one of the following disease processes/diagnoses(primary or secondary)? Other   Prep survey completed? Yes            BRIEN WITT - Registered Nurse

## 2025-03-21 ENCOUNTER — TRANSITIONAL CARE MANAGEMENT TELEPHONE ENCOUNTER (OUTPATIENT)
Dept: CALL CENTER | Facility: HOSPITAL | Age: 70
End: 2025-03-21
Payer: MEDICARE

## 2025-03-21 NOTE — OUTREACH NOTE
Call Center TCM Note      Flowsheet Row Responses   [facility] patient discharged from? Non-  []   Does the patient have one of the following disease processes/diagnoses(primary or secondary)? Other   TCM attempt successful? Yes   Call start time 1421   Call end time 1424   Discharge diagnosis Fever, unspecified fever cause (Primary Dx),  Clostridium difficile diarrhea,    Person spoke with today (if not patient) and relationship Patient   Meds reviewed with patient/caregiver? Yes   Is the patient having any side effects they believe may be caused by any medication additions or changes? No   Does the patient have all medications ordered at discharge? Yes   Is the patient taking all medications as directed (includes completed medication regime)? Yes   Comments Patient will followup with her oncologist and will call PCP if needs arise. She was discharged from  and feeling much better.   Does the patient have an appointment with their PCP within 7-14 days of discharge? Other   Nursing Interventions Patient desires to follow up with specialty only   Psychosocial issues? No   Did the patient receive a copy of their discharge instructions? Yes   Nursing interventions Reviewed instructions with patient   What is the patient's perception of their health status since discharge? Improving   Is the patient/caregiver able to teach back signs and symptoms related to disease process for when to call PCP? Yes   Is the patient/caregiver able to teach back signs and symptoms related to disease process for when to call 911? Yes   Is the patient/caregiver able to teach back the hierarchy of who to call/visit for symptoms/problems? PCP, Specialist, Home health nurse, Urgent Care, ED, 911 Yes   If the patient is a current smoker, are they able to teach back resources for cessation? Not a smoker   TCM call completed? Yes   Call end time 1424   Would this patient benefit from a Referral to [facility] Social Work? No   Is the patient  interested in additional calls from an ambulatory ? No            Conner Arnett RN    3/21/2025, 14:24 EDT

## 2025-04-04 DIAGNOSIS — E11.9 TYPE 2 DIABETES MELLITUS WITHOUT COMPLICATION, WITHOUT LONG-TERM CURRENT USE OF INSULIN: ICD-10-CM

## 2025-04-21 DIAGNOSIS — C50.412 MALIGNANT NEOPLASM OF UPPER-OUTER QUADRANT OF LEFT BREAST IN FEMALE, ESTROGEN RECEPTOR POSITIVE: ICD-10-CM

## 2025-04-21 DIAGNOSIS — Z17.0 MALIGNANT NEOPLASM OF UPPER-OUTER QUADRANT OF LEFT BREAST IN FEMALE, ESTROGEN RECEPTOR POSITIVE: ICD-10-CM

## 2025-04-21 RX ORDER — ANASTROZOLE 1 MG/1
1 TABLET ORAL DAILY
Qty: 90 TABLET | Refills: 3 | Status: SHIPPED | OUTPATIENT
Start: 2025-04-21

## 2025-07-07 ENCOUNTER — HOSPITAL ENCOUNTER (OUTPATIENT)
Dept: ULTRASOUND IMAGING | Facility: HOSPITAL | Age: 70
Discharge: HOME | End: 2025-07-07
Payer: MEDICARE

## 2025-07-07 ENCOUNTER — HOSPITAL ENCOUNTER (OUTPATIENT)
Dept: MAMMOGRAPHY | Facility: HOSPITAL | Age: 70
Discharge: HOME | End: 2025-07-07
Payer: MEDICARE

## 2025-07-07 DIAGNOSIS — C50.412 MALIGNANT NEOPLASM OF UPPER-OUTER QUADRANT OF LEFT BREAST IN FEMALE, ESTROGEN RECEPTOR POSITIVE: ICD-10-CM

## 2025-07-07 DIAGNOSIS — Z17.0 MALIGNANT NEOPLASM OF UPPER-OUTER QUADRANT OF LEFT BREAST IN FEMALE, ESTROGEN RECEPTOR POSITIVE: ICD-10-CM

## 2025-07-07 DIAGNOSIS — R92.8 ABNORMAL MAMMOGRAM: ICD-10-CM

## 2025-07-07 PROCEDURE — 77066 DX MAMMO INCL CAD BI: CPT

## 2025-07-07 PROCEDURE — 76642 ULTRASOUND BREAST LIMITED: CPT

## 2025-07-07 PROCEDURE — 77066 DX MAMMO INCL CAD BI: CPT | Performed by: RADIOLOGY

## 2025-07-07 PROCEDURE — G0279 TOMOSYNTHESIS, MAMMO: HCPCS | Performed by: RADIOLOGY

## 2025-07-07 PROCEDURE — 76642 ULTRASOUND BREAST LIMITED: CPT | Performed by: RADIOLOGY

## 2025-07-07 PROCEDURE — G0279 TOMOSYNTHESIS, MAMMO: HCPCS

## 2025-07-24 ENCOUNTER — TELEPHONE (OUTPATIENT)
Dept: ONCOLOGY | Facility: CLINIC | Age: 70
End: 2025-07-24
Payer: MEDICARE

## 2025-07-24 NOTE — TELEPHONE ENCOUNTER
Patient dropped off a note with package insert of some hydration ovals she was asking if it was okay for her to use. Patient dropped this off at  7/23/25. Called patient this morning and informed her that per Dr. Hernandez, okay for patient to use the hydration ovals. Patient stated understanding and had no further questions.

## 2025-07-30 ENCOUNTER — HOSPITAL ENCOUNTER (OUTPATIENT)
Dept: MRI IMAGING | Facility: HOSPITAL | Age: 70
Discharge: HOME | End: 2025-07-30
Admitting: SURGERY
Payer: MEDICARE

## 2025-07-30 DIAGNOSIS — C50.412 CARCINOMA OF UPPER-OUTER QUADRANT OF FEMALE BREAST, LEFT: ICD-10-CM

## 2025-07-30 PROCEDURE — A9577 INJ MULTIHANCE: HCPCS | Performed by: SURGERY

## 2025-07-30 PROCEDURE — C8908 MRI W/O FOL W/CONT, BREAST,: HCPCS

## 2025-07-30 PROCEDURE — 25510000002 GADOBENATE DIMEGLUMINE 529 MG/ML SOLUTION: Performed by: SURGERY

## 2025-07-30 PROCEDURE — C8937 CAD BREAST MRI: HCPCS

## 2025-07-30 RX ADMIN — GADOBENATE DIMEGLUMINE 14 ML: 529 INJECTION, SOLUTION INTRAVENOUS at 12:55

## 2025-08-02 DIAGNOSIS — E03.9 ACQUIRED HYPOTHYROIDISM: ICD-10-CM

## 2025-08-04 RX ORDER — LEVOTHYROXINE SODIUM 50 MCG
50 TABLET ORAL DAILY
Qty: 90 TABLET | Refills: 1 | Status: SHIPPED | OUTPATIENT
Start: 2025-08-04

## (undated) DEVICE — TBG PENCL TELESCP MEGADYNE SMOKE EVAC 10FT

## (undated) DEVICE — HYPODERMIC SAFETY NEEDLE: Brand: MONOJECT

## (undated) DEVICE — ANTIBACTERIAL UNDYED BRAIDED (POLYGLACTIN 910), SYNTHETIC ABSORBABLE SUTURE: Brand: COATED VICRYL

## (undated) DEVICE — GLV SURG SIGNATURE TOUCH PF LTX 7 STRL

## (undated) DEVICE — DECANTER BAG 9": Brand: MEDLINE INDUSTRIES, INC.

## (undated) DEVICE — INTENDED FOR TISSUE SEPARATION, AND OTHER PROCEDURES THAT REQUIRE A SHARP SURGICAL BLADE TO PUNCTURE OR CUT.: Brand: BARD-PARKER ® STAINLESS STEEL BLADES

## (undated) DEVICE — APPL CHLORAPREP W/TINT 26ML BLU

## (undated) DEVICE — DRSNG SURESITE WNDW 4X4.5

## (undated) DEVICE — SUT MNCRYL PLS ANTIB UD 4/0 PS2 18IN

## (undated) DEVICE — IRRIGATOR BULB ASEPTO 60CC STRL

## (undated) DEVICE — SUT SILK 2/0 PS 18IN 1588H

## (undated) DEVICE — TUBING, SUCTION, 1/4" X 10', STRAIGHT: Brand: MEDLINE

## (undated) DEVICE — PENCL SMOKE/EVAC MEGADYNE TELESCP 10FT

## (undated) DEVICE — TRAP FLD MINIVAC MEGADYNE 100ML

## (undated) DEVICE — GLV SURG SENSICARE PI ORTHO SZ7.5 LF STRL

## (undated) DEVICE — ELECTRD NDL EZ CLN MOD 2.75IN

## (undated) DEVICE — GLV SURG SENSICARE PI MIC PF SZ6.5 LF STRL

## (undated) DEVICE — PK MINOR SPLT 10

## (undated) DEVICE — PATIENT RETURN ELECTRODE, SINGLE-USE, CONTACT QUALITY MONITORING, ADULT, WITH 9FT CORD, FOR PATIENTS WEIGING OVER 33LBS. (15KG): Brand: MEGADYNE

## (undated) DEVICE — GLV SURG PREMIERPRO MIC LTX PF SZ6 BRN

## (undated) DEVICE — HDRST PAD EA/20PC

## (undated) DEVICE — DRAPE,TOP,102X53,STERILE: Brand: MEDLINE

## (undated) DEVICE — SCRB SURG BACTOSHIELD CHG 4PCT 4OZ

## (undated) DEVICE — SUT VICRYL PLS CTD ANTIB CR8 CT1 SZ0 27IN BR/VIL VCPP31D

## (undated) DEVICE — SPONGE,LAP,4"X18",XR,ST,5/PK,40PK/CS: Brand: MEDLINE INDUSTRIES, INC.

## (undated) DEVICE — LEX VAGINAL HYSTERECTOMY: Brand: MEDLINE INDUSTRIES, INC.